# Patient Record
Sex: MALE | Race: WHITE | NOT HISPANIC OR LATINO | Employment: OTHER | URBAN - METROPOLITAN AREA
[De-identification: names, ages, dates, MRNs, and addresses within clinical notes are randomized per-mention and may not be internally consistent; named-entity substitution may affect disease eponyms.]

---

## 2017-02-14 ENCOUNTER — TRANSCRIBE ORDERS (OUTPATIENT)
Dept: NUTRITION | Facility: HOSPITAL | Age: 67
End: 2017-02-14

## 2017-02-14 DIAGNOSIS — E11.9 DIABETES MELLITUS WITHOUT COMPLICATION (HCC): Primary | ICD-10-CM

## 2017-03-08 ENCOUNTER — OFFICE VISIT (OUTPATIENT)
Dept: NUTRITION | Facility: HOSPITAL | Age: 67
End: 2017-03-08

## 2017-03-08 PROCEDURE — G0108 DIAB MANAGE TRN  PER INDIV: HCPCS

## 2017-03-29 ENCOUNTER — APPOINTMENT (OUTPATIENT)
Dept: NUTRITION | Facility: HOSPITAL | Age: 67
End: 2017-03-29

## 2017-10-18 ENCOUNTER — OFFICE VISIT (OUTPATIENT)
Dept: CARDIOLOGY | Facility: CLINIC | Age: 67
End: 2017-10-18

## 2017-10-18 VITALS
OXYGEN SATURATION: 97 % | DIASTOLIC BLOOD PRESSURE: 70 MMHG | WEIGHT: 196.2 LBS | BODY MASS INDEX: 26.57 KG/M2 | SYSTOLIC BLOOD PRESSURE: 130 MMHG | HEIGHT: 72 IN

## 2017-10-18 DIAGNOSIS — I10 ESSENTIAL HYPERTENSION: ICD-10-CM

## 2017-10-18 DIAGNOSIS — E78.2 MIXED HYPERLIPIDEMIA: ICD-10-CM

## 2017-10-18 DIAGNOSIS — R07.9 EXERTIONAL CHEST PAIN: Primary | ICD-10-CM

## 2017-10-18 PROCEDURE — 93000 ELECTROCARDIOGRAM COMPLETE: CPT | Performed by: INTERNAL MEDICINE

## 2017-10-18 PROCEDURE — 99204 OFFICE O/P NEW MOD 45 MIN: CPT | Performed by: INTERNAL MEDICINE

## 2017-10-18 RX ORDER — ALFUZOSIN HYDROCHLORIDE 10 MG/1
10 TABLET, EXTENDED RELEASE ORAL DAILY
COMMUNITY
Start: 2017-08-29

## 2017-10-18 RX ORDER — TADALAFIL 5 MG/1
5 TABLET ORAL AS NEEDED
COMMUNITY
End: 2019-05-15 | Stop reason: SDUPTHER

## 2017-10-18 RX ORDER — ATORVASTATIN CALCIUM 20 MG/1
20 TABLET, FILM COATED ORAL DAILY
COMMUNITY
Start: 2017-08-29 | End: 2019-09-03 | Stop reason: SDUPTHER

## 2017-10-18 RX ORDER — EMPAGLIFLOZIN 10 MG/1
TABLET, FILM COATED ORAL
COMMUNITY
Start: 2017-09-26 | End: 2019-06-18 | Stop reason: ALTCHOICE

## 2017-10-18 NOTE — PROGRESS NOTES
Encounter Date:10/18/2017    Location: [place]    Newton Cruz MD    Patient ID: Francisco Gonzalez is a 66 y.o. male    1950  Subjective:      Chief Complaint/Reason for visit:    Chief Complaint   Patient presents with   • Chest Pain     Consult   • Abnormal ECG       Problem List:  1.  Chest pain   A.  Cardiolite 2013:  EF 63%, negative for ischemia  2.  Hypertension  3.  Hyperlipidemia  4.  Diabetes   A.  Hgb A1c- 6.7 2017  5.  Prostate    HPI:  Patient is a pleasant 66 year old male with the above noted medical history who presents today to re-establish care and to further assess his chest pain.  He states that he began noticing intermittent left sided chest pains a couple of months ago.  He states that it can occur at rest, but he notices it the most with activity.  He exercises on a regular basis with weights and cardio.  Although his twinges of chest pain do not necessarily stop him from exercising, he states he feels like he cannot go to his maximum capacity because of his chest pains.  He has a family history of coronary disease in his father.  He has significant cardiac risk factors such as hypertension, hyperlipidemia, diabetes, and family history.         Cardiac ROS:  Positive for chest pain.  Negative for shortness of breath, dyspnea on exertion, orthopnea, PND, fatigue, edema, palpitations, dizziness, pre-syncope/ syncope, snoring/ ISIAH    Cardiac Risk Factors: advanced age (older than 55 for men, 65 for women), diabetes mellitus, dyslipidemia, family history of premature cardiovascular disease, hypertension and male gender  Social History     Social History   • Marital status:      Spouse name: N/A   • Number of children: N/A   • Years of education: N/A     Occupational History   • Not on file.     Social History Main Topics   • Smoking status: Never Smoker   • Smokeless tobacco: Never Used   • Alcohol use No   • Drug use: No   • Sexual activity: Defer     Other Topics Concern    • Not on file     Social History Narrative   • No narrative on file       family history includes Heart attack in his father; Hyperlipidemia in his brother; Hypertension in his mother; No Known Problems in his sister.     has a past medical history of Anxiety; Chicken pox; Hyperlipidemia; Measles; and Type 2 diabetes mellitus.    No Known Allergies      Current Outpatient Prescriptions:   •  alfuzosin (UROXATRAL) 10 MG 24 hr tablet, Daily., Disp: , Rfl:   •  aspirin 81 MG tablet, Take 81 mg by mouth Daily., Disp: , Rfl:   •  atorvastatin (LIPITOR) 20 MG tablet, Daily., Disp: , Rfl:   •  DiphenhydrAMINE HCl, Sleep, (ZZZQUIL PO), Take  by mouth As Needed., Disp: , Rfl:   •  Hydrocortisone Acetate (PROCTOSOL RE), Insert  into the rectum As Needed., Disp: , Rfl:   •  JARDIANCE 10 MG tablet, As Needed., Disp: , Rfl:   •  metFORMIN (GLUCOPHAGE) 1000 MG tablet, 2 (Two) Times a Day., Disp: , Rfl: 0  •  Multiple Vitamins-Minerals (MULTIVITAMIN ADULTS 50+ PO), Take  by mouth Daily., Disp: , Rfl:   •  tadalafil (CIALIS) 5 MG tablet, Take 5 mg by mouth As Needed for erectile dysfunction., Disp: , Rfl:     Review of Systems   Constitution: Negative.   HENT: Negative.    Eyes: Negative.         Wears glasses   Cardiovascular: Positive for chest pain. Negative for claudication, dyspnea on exertion, irregular heartbeat, leg swelling, near-syncope, orthopnea, palpitations, paroxysmal nocturnal dyspnea and syncope.   Respiratory: Negative.    Endocrine: Negative.    Hematologic/Lymphatic: Negative.    Skin: Negative.    Musculoskeletal: Negative.    Gastrointestinal: Negative.    Genitourinary: Positive for urgency.   Neurological: Negative.    Psychiatric/Behavioral: Negative.    Allergic/Immunologic: Negative.        Vitals:    10/18/17 1359   BP: 130/70   SpO2: 97%          Objective:       Physical Exam   Constitutional: He is oriented to person, place, and time. He appears well-developed and well-nourished.   HENT:   Head:  Normocephalic and atraumatic.   Eyes: Pupils are equal, round, and reactive to light. Right eye exhibits no discharge. Left eye exhibits no discharge.   Neck: Normal range of motion. Neck supple. No JVD present.   Cardiovascular: Normal rate, regular rhythm, normal heart sounds and intact distal pulses.  Exam reveals no gallop and no friction rub.    No murmur heard.  Pulmonary/Chest: Effort normal and breath sounds normal. He has no wheezes.   Abdominal: Soft. Bowel sounds are normal. There is no tenderness.   Musculoskeletal: Normal range of motion. He exhibits no edema.   Neurological: He is alert and oriented to person, place, and time.   Skin: Skin is warm and dry.   Psychiatric: He has a normal mood and affect. His behavior is normal.       Data Review:     ECG 12 Lead  Date/Time: 10/18/2017 2:18 PM  Performed by: YUSUF HAYDEN  Authorized by: YUSUF HAYDEN   Rhythm: sinus rhythm  Rate: normal  BPM: 85  QRS axis: normal  Clinical impression: normal ECG        Assessment:      ICD-10-CM ICD-9-CM   1. Exertional chest pain- further testing warranted R07.9 786.50   2. Mixed hyperlipidemia- on statin E78.2 272.2   3. Essential hypertension- well controlled I10 401.9       Plan:  1.  Stress Echocardiogram  2. Continue current medications  3.  F/up in 3 years or sooner if needed.        Scribed for Lauren Ramos MD by SCOT Son. 10/18/2017  2:21 PM     I Lauren Ramos MD personally performed the services described in this documentation as scribed by the above individual in my presence, and it is both accurate and complete.    Lauren Ramos MD, [facility]

## 2017-11-17 ENCOUNTER — HOSPITAL ENCOUNTER (OUTPATIENT)
Dept: CARDIOLOGY | Facility: HOSPITAL | Age: 67
Discharge: HOME | End: 2017-11-17
Admitting: NURSE PRACTITIONER

## 2017-11-17 VITALS
HEART RATE: 65 BPM | DIASTOLIC BLOOD PRESSURE: 84 MMHG | BODY MASS INDEX: 26.01 KG/M2 | WEIGHT: 192 LBS | HEIGHT: 72 IN | SYSTOLIC BLOOD PRESSURE: 148 MMHG

## 2017-11-17 DIAGNOSIS — R07.9 EXERTIONAL CHEST PAIN: ICD-10-CM

## 2017-11-17 PROCEDURE — 93352 ADMIN ECG CONTRAST AGENT: CPT | Performed by: INTERNAL MEDICINE

## 2017-11-17 PROCEDURE — 25010000002 SULFUR HEXAFLUORIDE MICROSPH 60.7-25 MG RECONSTITUTED SUSPENSION: Performed by: INTERNAL MEDICINE

## 2017-11-17 PROCEDURE — 93350 STRESS TTE ONLY: CPT

## 2017-11-17 PROCEDURE — 93350 STRESS TTE ONLY: CPT | Performed by: INTERNAL MEDICINE

## 2017-11-17 PROCEDURE — 93018 CV STRESS TEST I&R ONLY: CPT | Performed by: INTERNAL MEDICINE

## 2017-11-17 PROCEDURE — 93017 CV STRESS TEST TRACING ONLY: CPT

## 2017-11-17 RX ADMIN — SULFUR HEXAFLUORIDE 5 ML: KIT at 09:13

## 2017-11-20 LAB
BH CV STRESS BP STAGE 1: NORMAL
BH CV STRESS BP STAGE 2: NORMAL
BH CV STRESS BP STAGE 3: NORMAL
BH CV STRESS BP STAGE 4: NORMAL
BH CV STRESS BP STAGE 5: NORMAL
BH CV STRESS DURATION MIN STAGE 1: 3
BH CV STRESS DURATION MIN STAGE 2: 3
BH CV STRESS DURATION MIN STAGE 3: 3
BH CV STRESS DURATION MIN STAGE 4: 3
BH CV STRESS DURATION MIN STAGE 5: 1
BH CV STRESS DURATION SEC STAGE 1: 0
BH CV STRESS DURATION SEC STAGE 2: 0
BH CV STRESS DURATION SEC STAGE 3: 0
BH CV STRESS DURATION SEC STAGE 4: 0
BH CV STRESS DURATION SEC STAGE 5: 4
BH CV STRESS GRADE STAGE 1: 10
BH CV STRESS GRADE STAGE 2: 12
BH CV STRESS GRADE STAGE 3: 14
BH CV STRESS GRADE STAGE 4: 16
BH CV STRESS GRADE STAGE 5: 18
BH CV STRESS HR STAGE 1: 99
BH CV STRESS HR STAGE 2: 104
BH CV STRESS HR STAGE 3: 126
BH CV STRESS HR STAGE 4: 144
BH CV STRESS HR STAGE 5: 151
BH CV STRESS METS STAGE 1: 5
BH CV STRESS METS STAGE 2: 7.5
BH CV STRESS METS STAGE 3: 10
BH CV STRESS METS STAGE 4: 13.5
BH CV STRESS METS STAGE 5: 15
BH CV STRESS O2 STAGE 1: 98
BH CV STRESS O2 STAGE 2: 99
BH CV STRESS O2 STAGE 3: 98
BH CV STRESS O2 STAGE 4: 98
BH CV STRESS PROTOCOL 1: NORMAL
BH CV STRESS RECOVERY BP: NORMAL MMHG
BH CV STRESS RECOVERY HR: 86 BPM
BH CV STRESS SPEED STAGE 1: 1.7
BH CV STRESS SPEED STAGE 2: 2.5
BH CV STRESS SPEED STAGE 3: 3.4
BH CV STRESS SPEED STAGE 4: 4.2
BH CV STRESS SPEED STAGE 5: 5
BH CV STRESS STAGE 1: 1
BH CV STRESS STAGE 2: 2
BH CV STRESS STAGE 3: 3
BH CV STRESS STAGE 4: 4
BH CV STRESS STAGE 5: 5
LV EF 2D ECHO EST: 60 %
MAXIMAL PREDICTED HEART RATE: 155 BPM
PERCENT MAX PREDICTED HR: 97.42 %
STRESS BASELINE BP: NORMAL MMHG
STRESS BASELINE HR: 71 BPM
STRESS PERCENT HR: 115 %
STRESS POST ESTIMATED WORKLOAD: 17.2 METS
STRESS POST EXERCISE DUR MIN: 13 MIN
STRESS POST EXERCISE DUR SEC: 4 SEC
STRESS POST O2 SAT PEAK: 98 %
STRESS POST PEAK BP: NORMAL MMHG
STRESS POST PEAK HR: 151 BPM
STRESS TARGET HR: 132 BPM

## 2018-02-26 ENCOUNTER — HOSPITAL ENCOUNTER (EMERGENCY)
Facility: HOSPITAL | Age: 68
Discharge: HOME | End: 2018-02-27
Attending: EMERGENCY MEDICINE | Admitting: EMERGENCY MEDICINE

## 2018-02-26 ENCOUNTER — APPOINTMENT (OUTPATIENT)
Dept: GENERAL RADIOLOGY | Facility: HOSPITAL | Age: 68
End: 2018-02-26

## 2018-02-26 DIAGNOSIS — R50.9 FEVER AND CHILLS: ICD-10-CM

## 2018-02-26 DIAGNOSIS — R05.9 COUGH: ICD-10-CM

## 2018-02-26 DIAGNOSIS — R68.89 FLU-LIKE SYMPTOMS: Primary | ICD-10-CM

## 2018-02-26 DIAGNOSIS — E11.9 TYPE 2 DIABETES MELLITUS WITHOUT COMPLICATION, WITHOUT LONG-TERM CURRENT USE OF INSULIN (HCC): ICD-10-CM

## 2018-02-26 LAB
ALBUMIN SERPL-MCNC: 4.2 G/DL (ref 3.2–4.8)
ALBUMIN/GLOB SERPL: 1.8 G/DL (ref 1.5–2.5)
ALP SERPL-CCNC: 74 U/L (ref 25–100)
ALT SERPL W P-5'-P-CCNC: 27 U/L (ref 7–40)
ANION GAP SERPL CALCULATED.3IONS-SCNC: 9 MMOL/L (ref 3–11)
AST SERPL-CCNC: 18 U/L (ref 0–33)
BASOPHILS # BLD AUTO: 0.01 10*3/MM3 (ref 0–0.2)
BASOPHILS NFR BLD AUTO: 0.1 % (ref 0–1)
BILIRUB SERPL-MCNC: 0.5 MG/DL (ref 0.3–1.2)
BUN BLD-MCNC: 20 MG/DL (ref 9–23)
BUN/CREAT SERPL: 22.2 (ref 7–25)
CALCIUM SPEC-SCNC: 8.9 MG/DL (ref 8.7–10.4)
CHLORIDE SERPL-SCNC: 108 MMOL/L (ref 99–109)
CO2 SERPL-SCNC: 21 MMOL/L (ref 20–31)
CREAT BLD-MCNC: 0.9 MG/DL (ref 0.6–1.3)
DEPRECATED RDW RBC AUTO: 41.9 FL (ref 37–54)
EOSINOPHIL # BLD AUTO: 0.01 10*3/MM3 (ref 0–0.3)
EOSINOPHIL NFR BLD AUTO: 0.1 % (ref 0–3)
ERYTHROCYTE [DISTWIDTH] IN BLOOD BY AUTOMATED COUNT: 12.8 % (ref 11.3–14.5)
FLUAV AG NPH QL: NEGATIVE
FLUBV AG NPH QL IA: NEGATIVE
GFR SERPL CREATININE-BSD FRML MDRD: 85 ML/MIN/1.73
GLOBULIN UR ELPH-MCNC: 2.4 GM/DL
GLUCOSE BLD-MCNC: 147 MG/DL (ref 70–100)
HCT VFR BLD AUTO: 42.9 % (ref 38.9–50.9)
HGB BLD-MCNC: 14.4 G/DL (ref 13.1–17.5)
IMM GRANULOCYTES # BLD: 0.02 10*3/MM3 (ref 0–0.03)
IMM GRANULOCYTES NFR BLD: 0.2 % (ref 0–0.6)
LYMPHOCYTES # BLD AUTO: 0.43 10*3/MM3 (ref 0.6–4.8)
LYMPHOCYTES NFR BLD AUTO: 4 % (ref 24–44)
MCH RBC QN AUTO: 29.9 PG (ref 27–31)
MCHC RBC AUTO-ENTMCNC: 33.6 G/DL (ref 32–36)
MCV RBC AUTO: 89.2 FL (ref 80–99)
MONOCYTES # BLD AUTO: 0.71 10*3/MM3 (ref 0–1)
MONOCYTES NFR BLD AUTO: 6.7 % (ref 0–12)
NEUTROPHILS # BLD AUTO: 9.49 10*3/MM3 (ref 1.5–8.3)
NEUTROPHILS NFR BLD AUTO: 88.9 % (ref 41–71)
PLATELET # BLD AUTO: 216 10*3/MM3 (ref 150–450)
PMV BLD AUTO: 10.7 FL (ref 6–12)
POTASSIUM BLD-SCNC: 3.8 MMOL/L (ref 3.5–5.5)
PROT SERPL-MCNC: 6.6 G/DL (ref 5.7–8.2)
RBC # BLD AUTO: 4.81 10*6/MM3 (ref 4.2–5.76)
SODIUM BLD-SCNC: 138 MMOL/L (ref 132–146)
WBC NRBC COR # BLD: 10.67 10*3/MM3 (ref 3.5–10.8)

## 2018-02-26 PROCEDURE — 71045 X-RAY EXAM CHEST 1 VIEW: CPT

## 2018-02-26 PROCEDURE — 87804 INFLUENZA ASSAY W/OPTIC: CPT | Performed by: EMERGENCY MEDICINE

## 2018-02-26 PROCEDURE — 99284 EMERGENCY DEPT VISIT MOD MDM: CPT

## 2018-02-26 PROCEDURE — 80053 COMPREHEN METABOLIC PANEL: CPT | Performed by: PHYSICIAN ASSISTANT

## 2018-02-26 PROCEDURE — 85025 COMPLETE CBC W/AUTO DIFF WBC: CPT | Performed by: PHYSICIAN ASSISTANT

## 2018-02-26 RX ORDER — DOXYCYCLINE HYCLATE 100 MG/1
100 TABLET, DELAYED RELEASE ORAL 2 TIMES DAILY
Qty: 14 TABLET | Refills: 0 | Status: SHIPPED | OUTPATIENT
Start: 2018-02-26 | End: 2019-06-18

## 2018-02-26 RX ORDER — DOXYCYCLINE HYCLATE 100 MG
100 TABLET ORAL ONCE
Status: COMPLETED | OUTPATIENT
Start: 2018-02-26 | End: 2018-02-27

## 2018-02-26 RX ORDER — OSELTAMIVIR PHOSPHATE 75 MG/1
75 CAPSULE ORAL EVERY 12 HOURS
Qty: 9 CAPSULE | Refills: 0 | Status: SHIPPED | OUTPATIENT
Start: 2018-02-26 | End: 2018-10-28

## 2018-02-26 RX ORDER — AZITHROMYCIN 250 MG/1
250 TABLET, FILM COATED ORAL DAILY
COMMUNITY
End: 2019-06-18

## 2018-02-26 RX ORDER — OSELTAMIVIR PHOSPHATE 75 MG/1
75 CAPSULE ORAL ONCE
Status: COMPLETED | OUTPATIENT
Start: 2018-02-26 | End: 2018-02-27

## 2018-02-26 RX ORDER — IBUPROFEN 600 MG/1
600 TABLET ORAL EVERY 4 HOURS PRN
Status: DISCONTINUED | OUTPATIENT
Start: 2018-02-26 | End: 2018-02-27 | Stop reason: HOSPADM

## 2018-02-26 RX ORDER — ACETAMINOPHEN 325 MG/1
650 TABLET ORAL ONCE
Status: COMPLETED | OUTPATIENT
Start: 2018-02-26 | End: 2018-02-26

## 2018-02-26 RX ADMIN — ACETAMINOPHEN 650 MG: 325 TABLET, FILM COATED ORAL at 21:33

## 2018-02-26 RX ADMIN — IBUPROFEN 600 MG: 600 TABLET, FILM COATED ORAL at 23:22

## 2018-02-27 VITALS
WEIGHT: 187 LBS | DIASTOLIC BLOOD PRESSURE: 75 MMHG | OXYGEN SATURATION: 94 % | TEMPERATURE: 99.1 F | SYSTOLIC BLOOD PRESSURE: 135 MMHG | BODY MASS INDEX: 25.33 KG/M2 | HEART RATE: 101 BPM | HEIGHT: 72 IN | RESPIRATION RATE: 16 BRPM

## 2018-02-27 RX ADMIN — OSELTAMIVIR PHOSPHATE 75 MG: 75 CAPSULE ORAL at 00:06

## 2018-02-27 RX ADMIN — DOXYCYCLINE HYCLATE 100 MG: 100 TABLET, COATED ORAL at 00:07

## 2019-01-03 ENCOUNTER — OFFICE VISIT (OUTPATIENT)
Dept: INTERNAL MEDICINE | Facility: CLINIC | Age: 69
End: 2019-01-03

## 2019-01-03 VITALS
HEART RATE: 72 BPM | WEIGHT: 186 LBS | HEIGHT: 71 IN | TEMPERATURE: 98.4 F | SYSTOLIC BLOOD PRESSURE: 124 MMHG | DIASTOLIC BLOOD PRESSURE: 78 MMHG | BODY MASS INDEX: 26.04 KG/M2

## 2019-01-03 DIAGNOSIS — L98.9 SKIN LESION: Primary | ICD-10-CM

## 2019-01-03 PROCEDURE — 99213 OFFICE O/P EST LOW 20 MIN: CPT | Performed by: PHYSICIAN ASSISTANT

## 2019-01-03 RX ORDER — INFLUENZA A VIRUS A/MICHIGAN/45/2015 X-275 (H1N1) ANTIGEN (FORMALDEHYDE INACTIVATED), INFLUENZA A VIRUS A/SINGAPORE/INFIMH-16-0019/2016 IVR-186 (H3N2) ANTIGEN (FORMALDEHYDE INACTIVATED), AND INFLUENZA B VIRUS B/MARYLAND/15/2016 BX-69A (A B/COLORADO/6/2017-LIKE VIRUS) ANTIGEN (FORMALDEHYDE INACTIVATED) 60; 60; 60 UG/.5ML; UG/.5ML; UG/.5ML
INJECTION, SUSPENSION INTRAMUSCULAR
Refills: 0 | COMMUNITY
Start: 2018-10-07 | End: 2019-09-03

## 2019-01-03 NOTE — PROGRESS NOTES
"Patient Care Team:  Newton Cruz MD as PCP - General (Internal Medicine)  Gisselle Izaguirre PA-C as Physician Assistant (Internal Medicine)    Chief Complaint;:   Chief Complaint   Patient presents with   • Rash     on top of head        Subjective     HPI  Nonhealing skin lesion on top of head for several weeks  Past Medical History:   Diagnosis Date   • Anxiety    • Chicken pox    • Hyperlipidemia    • Measles    • Type 2 diabetes mellitus (CMS/[facility])        Social History     Socioeconomic History   • Marital status:      Spouse name: Not on file   • Number of children: Not on file   • Years of education: Not on file   • Highest education level: Not on file   Social Needs   • Financial resource strain: Not on file   • Food insecurity - worry: Not on file   • Food insecurity - inability: Not on file   • Transportation needs - medical: Not on file   • Transportation needs - non-medical: Not on file   Occupational History   • Not on file   Tobacco Use   • Smoking status: Never Smoker   • Smokeless tobacco: Never Used   Substance and Sexual Activity   • Alcohol use: No   • Drug use: No   • Sexual activity: Defer   Other Topics Concern   • Not on file   Social History Narrative   • Not on file       No Known Allergies    Review of Systems:     Review of Systems   Skin: Positive for skin lesions.       Vital Signs  Vitals:    01/03/19 1634   BP: 124/78   BP Location: Left arm   Patient Position: Sitting   Cuff Size: Adult   Pulse: 72   Temp: 98.4 °F (36.9 °C)   TempSrc: Temporal   Weight: 84.4 kg (186 lb)   Height: 180.3 cm (71\")   PainSc: 0-No pain         Current Outpatient Medications:   •  alfuzosin (UROXATRAL) 10 MG 24 hr tablet, Daily., Disp: , Rfl:   •  atorvastatin (LIPITOR) 20 MG tablet, Daily., Disp: , Rfl:   •  DiphenhydrAMINE HCl, Sleep, (ZZZQUIL PO), Take  by mouth As Needed., Disp: , Rfl:   •  doxycycline (DORYX) 100 MG enteric coated tablet, Take 1 tablet by mouth 2 (Two) Times a Day., " Disp: 14 tablet, Rfl: 0  •  Empagliflozin (JARDIANCE) 10 MG tablet, Jardiance 10 mg tablet  Take 1 tablet every day by oral route., Disp: , Rfl:   •  Hydrocortisone Acetate (PROCTOSOL RE), Insert  into the rectum As Needed., Disp: , Rfl:   •  JARDIANCE 10 MG tablet, , Disp: , Rfl:   •  metFORMIN (GLUCOPHAGE) 1000 MG tablet, 2 (Two) Times a Day., Disp: , Rfl: 0  •  Multiple Vitamins-Minerals (MULTIVITAMIN ADULTS 50+ PO), Take  by mouth Daily., Disp: , Rfl:   •  tadalafil (CIALIS) 5 MG tablet, Take 5 mg by mouth As Needed for erectile dysfunction., Disp: , Rfl:   •  aspirin 81 MG tablet, Take 81 mg by mouth Daily., Disp: , Rfl:   •  azithromycin (ZITHROMAX) 250 MG tablet, Take 250 mg by mouth Daily. Take 2 tablets the first day, then 1 tablet daily for 4 days., Disp: , Rfl:   •  cephalexin (KEFLEX) 500 MG capsule, Take 1 capsule by mouth 3 (Three) Times a Day., Disp: 30 capsule, Rfl: 0  •  FLUZONE HIGH-DOSE 0.5 ML suspension prefilled syringe injection, ADM 0.5ML IM UTD, Disp: , Rfl: 0  •  predniSONE (DELTASONE) 10 MG tablet, Daily taper - 6/5/4/3/2/1, Disp: 21 tablet, Rfl: 0  •  promethazine-codeine (PHENERGAN with CODEINE) 6.25-10 MG/5ML syrup, Take 5 mL by mouth Every 4 (Four) Hours As Needed for Cough., Disp: 90 mL, Rfl: 0    Physical Exam:    Physical Exam   Constitutional: He appears well-developed and well-nourished.   Skin: Skin is warm and dry.   Dry flaking skin lesion top of scalp   Nursing note and vitals reviewed.        Assessment/Plan   Encounter Diagnosis   Name Primary?   • Skin lesion Yes       Plan of care reviewed with patient at the conclusion of today's visit. Education was provided regarding diagnosis, management, and any prescribed or recommended OTC medications.Patient verbalizes understanding of and agreement with management plan.     Gisselle Izaguirre PA-C

## 2019-04-01 PROBLEM — J45.909 ASTHMA: Status: ACTIVE | Noted: 2019-04-01

## 2019-04-01 PROBLEM — N40.0 HYPERTROPHY OF PROSTATE WITHOUT URINARY OBSTRUCTION: Status: ACTIVE | Noted: 2019-04-01

## 2019-04-01 PROBLEM — E11.9 DIABETES MELLITUS, TYPE II: Status: ACTIVE | Noted: 2019-04-01

## 2019-04-01 PROBLEM — N41.0 ACUTE PROSTATITIS: Status: ACTIVE | Noted: 2019-04-01

## 2019-04-01 PROBLEM — F41.1 ANXIETY STATE: Status: ACTIVE | Noted: 2019-04-01

## 2019-04-01 PROBLEM — H90.3 SENSORINEURAL HEARING LOSS (SNHL) OF BOTH EARS: Status: ACTIVE | Noted: 2019-04-01

## 2019-04-01 PROBLEM — J30.9 ALLERGIC RHINITIS: Status: ACTIVE | Noted: 2019-04-01

## 2019-05-14 ENCOUNTER — PATIENT MESSAGE (OUTPATIENT)
Dept: INTERNAL MEDICINE | Facility: CLINIC | Age: 69
End: 2019-05-14

## 2019-05-14 NOTE — TELEPHONE ENCOUNTER
From: Francisco Gonzalez  To: OtoeNewton banda MD  Sent: 5/14/2019 11:33 AM EDT  Subject: Prescription Question    Januszmaritza Bradford, I have run out of my Cilias samples and need a script. I had to double the dose to 10mg and that dose is very effective. Even as a generic they are expensive. I called [facility] in [place] and 10 of the 10mg were about $239.00. I've read some on line info getting these drugs drugs from a company called [employer]. You do an on line assessment and they will ship to you. Couldn't get a price and didn't complete the assessment as they were wanting a lot of medical info. Have any patients used this system?

## 2019-05-15 RX ORDER — TADALAFIL 5 MG/1
5 TABLET ORAL AS NEEDED
Qty: 30 TABLET | Refills: 5 | Status: SHIPPED | OUTPATIENT
Start: 2019-05-15 | End: 2021-01-05

## 2019-06-13 PROBLEM — Z00.00 ANNUAL PHYSICAL EXAM: Status: ACTIVE | Noted: 2019-06-13

## 2019-06-13 PROBLEM — R07.89 ATYPICAL CHEST PAIN: Status: ACTIVE | Noted: 2019-06-13

## 2019-06-18 ENCOUNTER — OFFICE VISIT (OUTPATIENT)
Dept: INTERNAL MEDICINE | Facility: CLINIC | Age: 69
End: 2019-06-18

## 2019-06-18 VITALS
SYSTOLIC BLOOD PRESSURE: 122 MMHG | WEIGHT: 182 LBS | HEIGHT: 71 IN | BODY MASS INDEX: 25.48 KG/M2 | HEART RATE: 80 BPM | DIASTOLIC BLOOD PRESSURE: 70 MMHG

## 2019-06-18 DIAGNOSIS — E11.9 TYPE 2 DIABETES MELLITUS WITHOUT COMPLICATION, WITHOUT LONG-TERM CURRENT USE OF INSULIN (HCC): ICD-10-CM

## 2019-06-18 DIAGNOSIS — E78.2 MIXED HYPERLIPIDEMIA: ICD-10-CM

## 2019-06-18 DIAGNOSIS — I10 ESSENTIAL HYPERTENSION: Primary | ICD-10-CM

## 2019-06-18 DIAGNOSIS — N40.0 HYPERTROPHY OF PROSTATE WITHOUT URINARY OBSTRUCTION: ICD-10-CM

## 2019-06-18 PROCEDURE — 99214 OFFICE O/P EST MOD 30 MIN: CPT | Performed by: INTERNAL MEDICINE

## 2019-06-18 RX ORDER — EMPAGLIFLOZIN 25 MG/1
1 TABLET, FILM COATED ORAL DAILY
COMMUNITY
Start: 2019-04-15 | End: 2021-04-07

## 2019-06-18 NOTE — PROGRESS NOTES
[facility]     Francisco Gonzalez  1950   [id number]      Patient Care Team:  Newton Cruz MD as PCP - General (Internal Medicine)  Tracy Romero APRN as PCP - Claims Attributed  Gisselle Izaguirre PA-C as Physician Assistant (Internal Medicine)    Chief Complaint::   Chief Complaint   Patient presents with   • Hyperlipidemia   • Hypertension   • Diabetes        HPI  Mr. Gonzalez is now 68.  He comes in for follow-up of his hypertension hyperlipidemia and BPH.  He continues to see Dr. Gray for his diabetes.  His last A1c was 7.4.  He continues to work hard with diet and exercise.  He recently saw Dr. Roca for his BPH.  His PSA was less than 1.  He feels well and has no complaints.  Maintains very active physically, working out regularly.  There is no chest pain or dyspnea.    Chronic Conditions:      Patient Active Problem List   Diagnosis   • Exertional chest pain   • Essential hypertension   • Mixed hyperlipidemia   • Asthma   • Sensorineural hearing loss (SNHL) of both ears   • Allergic rhinitis   • Acute prostatitis   • Diabetes mellitus, type II (CMS/HCC)   • Hypertrophy of prostate without urinary obstruction   • Anxiety state   • Annual physical exam   • Atypical chest pain        Past Medical History:   Diagnosis Date   • Anxiety    • Chicken pox    • Colonic polyp    • Hydrocele 2001    left; repaired   • Hyperlipidemia    • Measles    • Quadriceps tendon rupture 2010   • Type 2 diabetes mellitus (CMS/HCC)        Past Surgical History:   Procedure Laterality Date   • APPENDECTOMY  1986   • HYDROCELE EXCISION / REPAIR Left 2001   • KNEE SURGERY      left knee   • QUADRICEPS TENDON REPAIR  2010    left quadracepts tendon rupture   • VASECTOMY         Family History   Problem Relation Age of Onset   • Hypertension Mother    • Heart attack Father         MI   • Coronary artery disease Father 59        premature   • No Known Problems Sister    • Hyperlipidemia Brother         Social History     Socioeconomic History   • Marital status:      Spouse name: Not on file   • Number of children: Not on file   • Years of education: Not on file   • Highest education level: Not on file   Tobacco Use   • Smoking status: Never Smoker   • Smokeless tobacco: Never Used   Substance and Sexual Activity   • Alcohol use: No   • Drug use: No   • Sexual activity: Defer       No Known Allergies      Current Outpatient Medications:   •  alfuzosin (UROXATRAL) 10 MG 24 hr tablet, Daily., Disp: , Rfl:   •  aspirin 81 MG tablet, Take 81 mg by mouth Daily., Disp: , Rfl:   •  atorvastatin (LIPITOR) 20 MG tablet, Daily., Disp: , Rfl:   •  DiphenhydrAMINE HCl, Sleep, (ZZZQUIL PO), Take  by mouth As Needed., Disp: , Rfl:   •  Hydrocortisone Acetate (PROCTOSOL RE), Insert  into the rectum As Needed., Disp: , Rfl:   •  JARDIANCE 25 MG tablet, Take 1 tablet by mouth Daily., Disp: , Rfl:   •  metFORMIN (GLUCOPHAGE) 1000 MG tablet, 2 (Two) Times a Day., Disp: , Rfl: 0  •  Multiple Vitamins-Minerals (MULTIVITAMIN ADULTS 50+ PO), Take  by mouth Daily., Disp: , Rfl:   •  tadalafil (CIALIS) 5 MG tablet, Take 1 tablet by mouth As Needed for erectile dysfunction., Disp: 30 tablet, Rfl: 5  •  FLUZONE HIGH-DOSE 0.5 ML suspension prefilled syringe injection, ADM 0.5ML IM UTD, Disp: , Rfl: 0    Review of Systems   Constitutional: Negative for chills, fatigue and fever.   HENT: Negative for congestion, ear pain and sinus pressure.    Respiratory: Negative for cough, chest tightness, shortness of breath and wheezing.    Cardiovascular: Negative for chest pain and palpitations.   Gastrointestinal: Negative for abdominal pain, blood in stool and constipation.   Skin: Negative for color change.   Allergic/Immunologic: Negative for environmental allergies.   Neurological: Negative for dizziness, speech difficulty and headache.   Psychiatric/Behavioral: Negative for decreased concentration. The patient is not nervous/anxious.   "       Vital Signs  Vitals:    06/18/19 1007   BP: 122/70   BP Location: Right arm   Patient Position: Sitting   Cuff Size: Adult   Pulse: 80   Weight: 82.6 kg (182 lb)   Height: 180.3 cm (70.98\")       Physical Exam   Constitutional: He is oriented to person, place, and time. He appears well-developed and well-nourished.   HENT:   Head: Normocephalic and atraumatic.   Cardiovascular: Normal rate, regular rhythm and normal heart sounds.   No murmur heard.  Pulmonary/Chest: Effort normal and breath sounds normal.   Neurological: He is alert and oriented to person, place, and time.   Psychiatric: He has a normal mood and affect.   Vitals reviewed.     Procedures    ACE III MINI             Assessment/Plan:    Francisco was seen today for hyperlipidemia, hypertension and diabetes.    Diagnoses and all orders for this visit:    Essential hypertension    Type 2 diabetes mellitus without complication, without long-term current use of insulin (CMS/[facility])    Mixed hyperlipidemia    Hypertrophy of prostate without urinary obstruction    Plan    Blood pressure is well controlled with diet and exercise.     Last A1c was 7.4 per Dr Allen. Continue jardiance and metformin, as well as his efforts with diet and exercise      Lipid panel pending, continue atovastatin.     BPH controlled per Dr Roca.       Plan of care reviewed with patient at the conclusion of today's visit. Education was provided regarding diagnosis, management, and any prescribed or recommended OTC medications.Patient verbalizes understanding of and agreement with management plan.         Newton Cruz MD           "

## 2019-08-31 RX ORDER — ATORVASTATIN CALCIUM 20 MG/1
TABLET, FILM COATED ORAL
Qty: 90 TABLET | Refills: 0 | Status: CANCELLED | OUTPATIENT
Start: 2019-08-31

## 2019-09-03 ENCOUNTER — OFFICE VISIT (OUTPATIENT)
Dept: INTERNAL MEDICINE | Facility: CLINIC | Age: 69
End: 2019-09-03

## 2019-09-03 VITALS
HEART RATE: 88 BPM | BODY MASS INDEX: 24.78 KG/M2 | HEIGHT: 71 IN | WEIGHT: 177 LBS | DIASTOLIC BLOOD PRESSURE: 68 MMHG | SYSTOLIC BLOOD PRESSURE: 122 MMHG | TEMPERATURE: 98.6 F

## 2019-09-03 DIAGNOSIS — B96.89 ACUTE BACTERIAL RHINOSINUSITIS: Primary | ICD-10-CM

## 2019-09-03 DIAGNOSIS — J01.90 ACUTE BACTERIAL RHINOSINUSITIS: Primary | ICD-10-CM

## 2019-09-03 PROCEDURE — 99213 OFFICE O/P EST LOW 20 MIN: CPT | Performed by: NURSE PRACTITIONER

## 2019-09-03 RX ORDER — AMOXICILLIN AND CLAVULANATE POTASSIUM 875; 125 MG/1; MG/1
1 TABLET, FILM COATED ORAL 2 TIMES DAILY
Qty: 14 TABLET | Refills: 0 | Status: SHIPPED | OUTPATIENT
Start: 2019-09-03 | End: 2019-12-03

## 2019-09-03 RX ORDER — ATORVASTATIN CALCIUM 20 MG/1
20 TABLET, FILM COATED ORAL DAILY
Qty: 90 TABLET | Refills: 1 | Status: SHIPPED | OUTPATIENT
Start: 2019-09-03 | End: 2020-02-25

## 2019-09-03 NOTE — PATIENT INSTRUCTIONS
Take antibiotic as directed.  Take with food, make sure you finish the entire prescription.  Drink extra fluids.  Use warm moist compresses to sinuses 3-4 x daily.  Nasal saline sprays may help also.  Continue your other regular medications.  If fever or discomfort use otc acetaminophen or ibuprophen as directed.  Avoid exposure to cigarette smoke.

## 2019-09-03 NOTE — PROGRESS NOTES
Francisco Gonzalez  1950  [id number]  Patient Care Team:  Newton Cruz MD as PCP - General (Internal Medicine)  Mane Roca MD as PCP - Claims Attributed  Gisselle Izaguirre PA-C as Physician Assistant (Internal Medicine)    Francisco Gonzalez is a pleasant 68 y.o. male who presents for evaluation of Sinus Problem      Chief Complaint   Patient presents with   • Sinus Problem       HPI:   2 wk hx bilat maxillary facial pain and nasal congestion and drainge. No fever, dark yellow mucous, gradually worse, teeth hurting last night.  Using mucinex BID.    Right ear known cerumen impaction, appt with ENT tomorrow  Past Medical History:   Diagnosis Date   • Anxiety    • Chicken pox    • Colonic polyp    • Hydrocele 2001    left; repaired   • Hyperlipidemia    • Measles    • Quadriceps tendon rupture 2010   • Type 2 diabetes mellitus (CMS/HCC)      Past Surgical History:   Procedure Laterality Date   • APPENDECTOMY  1986   • HYDROCELE EXCISION / REPAIR Left 2001   • KNEE SURGERY      left knee   • QUADRICEPS TENDON REPAIR  2010    left quadracepts tendon rupture   • VASECTOMY       Family History   Problem Relation Age of Onset   • Hypertension Mother    • Heart attack Father         MI   • Coronary artery disease Father 59        premature   • No Known Problems Sister    • Hyperlipidemia Brother      Social History     Tobacco Use   Smoking Status Never Smoker   Smokeless Tobacco Never Used     No Known Allergies    Current Outpatient Medications:   •  alfuzosin (UROXATRAL) 10 MG 24 hr tablet, Take 10 mg by mouth Daily., Disp: , Rfl:   •  aspirin 81 MG tablet, Take 81 mg by mouth Daily., Disp: , Rfl:   •  atorvastatin (LIPITOR) 20 MG tablet, Take 1 tablet by mouth Daily., Disp: 90 tablet, Rfl: 1  •  DiphenhydrAMINE HCl, Sleep, (ZZZQUIL PO), Take As Directed., Disp: , Rfl:   •  Hydrocortisone Acetate (PROCTOSOL RE), Insert 1 application into the rectum As Needed., Disp: , Rfl:   •  JARDIANCE 25 MG  "tablet, Take 1 tablet by mouth Daily., Disp: , Rfl:   •  metFORMIN (GLUCOPHAGE) 1000 MG tablet, Take 1,000 mg by mouth 2 (Two) Times a Day With Meals., Disp: , Rfl: 0  •  Multiple Vitamins-Minerals (MULTIVITAMIN ADULTS 50+ PO), Take 1 tablet by mouth Daily., Disp: , Rfl:   •  tadalafil (CIALIS) 5 MG tablet, Take 1 tablet by mouth As Needed for erectile dysfunction., Disp: 30 tablet, Rfl: 5  •  amoxicillin-clavulanate (AUGMENTIN) 875-125 MG per tablet, Take 1 tablet by mouth 2 (Two) Times a Day., Disp: 14 tablet, Rfl: 0    Review of Systems   Constitutional: Negative for chills, fatigue and fever.   HENT: Positive for congestion and sinus pressure. Negative for ear pain.    Respiratory: Positive for cough. Negative for chest tightness, shortness of breath and wheezing.    Cardiovascular: Negative for chest pain and palpitations.   Gastrointestinal: Negative for abdominal pain, blood in stool and constipation.   Skin: Negative for color change.   Allergic/Immunologic: Positive for environmental allergies.   Neurological: Negative for dizziness, speech difficulty and headache.   Psychiatric/Behavioral: Negative for decreased concentration. The patient is not nervous/anxious.      /68 (BP Location: Left arm, Patient Position: Sitting, Cuff Size: Adult)   Pulse 88   Temp 98.6 °F (37 °C) (Temporal)   Ht 180.3 cm (70.98\")   Wt 80.3 kg (177 lb)   BMI 24.70 kg/m²     Physical Exam   Constitutional: He appears well-developed and well-nourished.   HENT:   Head: Normocephalic and atraumatic.   Right Ear: External ear normal. cerumen impaction is present.  Left Ear: External ear normal.   Nose: Mucosal edema, rhinorrhea and sinus tenderness present. Right sinus exhibits maxillary sinus tenderness. Left sinus exhibits maxillary sinus tenderness.   Mouth/Throat: Oropharyngeal exudate present.   Eyes: Conjunctivae and EOM are normal.   Neck: Normal range of motion. Neck supple.   Cardiovascular: Normal rate, regular " rhythm and normal heart sounds.   Pulmonary/Chest: Effort normal and breath sounds normal.   Abdominal: Soft. Bowel sounds are normal.   Musculoskeletal: Normal range of motion.   Neurological: He is alert.   Skin: Skin is warm and dry.   Psychiatric: He has a normal mood and affect. His behavior is normal. Thought content normal.       Assessment/Plan:  Francisco was seen today for sinus problem.    Diagnoses and all orders for this visit:    Acute bacterial rhinosinusitis    Other orders  -     atorvastatin (LIPITOR) 20 MG tablet; Take 1 tablet by mouth Daily.  -     amoxicillin-clavulanate (AUGMENTIN) 875-125 MG per tablet; Take 1 tablet by mouth 2 (Two) Times a Day.       Patient Instructions   Take antibiotic as directed.  Take with food, make sure you finish the entire prescription.  Drink extra fluids.  Use warm moist compresses to sinuses 3-4 x daily.  Nasal saline sprays may help also.  Continue your other regular medications.  If fever or discomfort use otc acetaminophen or ibuprophen as directed.  Avoid exposure to cigarette smoke.      Plan of care reviewed with patient at the conclusion of today's visit. Education was provided regarding diagnosis, management and any prescribed or recommended OTC medications.  Patient verbalizes understanding of and agreement with management plan.    Return if symptoms worsen or fail to improve.    *Note that portions of this note were completed with a voice recognition program.  Efforts were made to edit the dictation but occasionally words are transcribed.    SCOT Batista

## 2019-12-03 ENCOUNTER — OFFICE VISIT (OUTPATIENT)
Dept: INTERNAL MEDICINE | Facility: CLINIC | Age: 69
End: 2019-12-03

## 2019-12-03 VITALS
HEIGHT: 71 IN | HEART RATE: 64 BPM | WEIGHT: 178 LBS | BODY MASS INDEX: 24.92 KG/M2 | TEMPERATURE: 98 F | SYSTOLIC BLOOD PRESSURE: 130 MMHG | DIASTOLIC BLOOD PRESSURE: 68 MMHG

## 2019-12-03 DIAGNOSIS — H61.21 IMPACTED CERUMEN OF RIGHT EAR: ICD-10-CM

## 2019-12-03 DIAGNOSIS — H91.91 DECREASED HEARING OF RIGHT EAR: Primary | ICD-10-CM

## 2019-12-03 PROCEDURE — 99213 OFFICE O/P EST LOW 20 MIN: CPT | Performed by: PHYSICIAN ASSISTANT

## 2019-12-03 PROCEDURE — 69210 REMOVE IMPACTED EAR WAX UNI: CPT | Performed by: PHYSICIAN ASSISTANT

## 2019-12-03 RX ORDER — LANCETS
EACH MISCELLANEOUS
Refills: 6 | COMMUNITY
Start: 2019-10-14 | End: 2021-07-15

## 2019-12-03 RX ORDER — BLOOD SUGAR DIAGNOSTIC
STRIP MISCELLANEOUS
Refills: 3 | COMMUNITY
Start: 2019-09-27 | End: 2021-04-13 | Stop reason: SDUPTHER

## 2019-12-03 NOTE — PROGRESS NOTES
Ear Cerumen Removal  Date/Time: 12/3/2019 4:45 PM  Performed by: Cherie Calhoun PA-C  Authorized by: Cherie Calhoun PA-C     Anesthesia:  Local Anesthetic: none  Location details: right ear  Patient tolerance: Patient tolerated the procedure well with no immediate complications  Procedure type: instrumentation   Sedation:  Patient sedated: no

## 2019-12-03 NOTE — PROGRESS NOTES
"Patient Care Team:  Newton Cruz MD as PCP - General (Internal Medicine)  Tracy Romero APRN as PCP - Claims Attributed  Gisselle Izaguirre PA-C as Physician Assistant (Internal Medicine)    Chief Complaint::   Chief Complaint   Patient presents with   • Cerumen Impaction     denies pain, muffled sound      2  Subjective     HPI  Pt presents with new onset \"muffled\" hearing in right ear.  He has a history of cerumen impaction, sees ENT every three months.  He denies ear pain.          The following portions of the patient's history were reviewed and updated as appropriate: active problem list, medication list, allergies, social history    Review of Systems:   Review of Systems   Constitutional: Negative for chills, fatigue and fever.   HENT: Positive for hearing loss and tinnitus. Negative for congestion, ear discharge, ear pain and sinus pressure.    Eyes: Negative for blurred vision and visual disturbance.   Respiratory: Negative for cough, chest tightness, shortness of breath and wheezing.    Cardiovascular: Negative for chest pain and palpitations.   Gastrointestinal: Negative for abdominal pain, blood in stool and constipation.   Skin: Negative for color change.   Allergic/Immunologic: Negative for environmental allergies.   Neurological: Negative for dizziness, speech difficulty and headache.   Psychiatric/Behavioral: Negative for decreased concentration. The patient is not nervous/anxious.        Vital Signs  Vitals:    12/03/19 1610   BP: 130/68   BP Location: Left arm   Patient Position: Sitting   Cuff Size: Adult   Pulse: 64   Temp: 98 °F (36.7 °C)   TempSrc: Temporal   Weight: 80.7 kg (178 lb)   Height: 180.3 cm (70.98\")   PainSc: 0-No pain     Body mass index is 24.84 kg/m².    Labs  No visits with results within 3 Month(s) from this visit.   Latest known visit with results is:   Admission on 02/26/2018, Discharged on 02/27/2018   Component Date Value Ref Range Status   • Influenza A Ag, EIA " 02/26/2018 Negative  Negative Final   • Influenza B Ag, EIA 02/26/2018 Negative  Negative Final   • Glucose 02/26/2018 147* 70 - 100 mg/dL Final   • BUN 02/26/2018 20  9 - 23 mg/dL Final   • Creatinine 02/26/2018 0.90  0.60 - 1.30 mg/dL Final   • Sodium 02/26/2018 138  132 - 146 mmol/L Final   • Potassium 02/26/2018 3.8  3.5 - 5.5 mmol/L Final   • Chloride 02/26/2018 108  99 - 109 mmol/L Final   • CO2 02/26/2018 21.0  20.0 - 31.0 mmol/L Final   • Calcium 02/26/2018 8.9  8.7 - 10.4 mg/dL Final   • Total Protein 02/26/2018 6.6  5.7 - 8.2 g/dL Final   • Albumin 02/26/2018 4.20  3.20 - 4.80 g/dL Final   • ALT (SGPT) 02/26/2018 27  7 - 40 U/L Final   • AST (SGOT) 02/26/2018 18  0 - 33 U/L Final   • Alkaline Phosphatase 02/26/2018 74  25 - 100 U/L Final   • Total Bilirubin 02/26/2018 0.5  0.3 - 1.2 mg/dL Final   • eGFR Non African Amer 02/26/2018 85  >60 mL/min/1.73 Final   • Globulin 02/26/2018 2.4  gm/dL Final   • A/G Ratio 02/26/2018 1.8  1.5 - 2.5 g/dL Final   • BUN/Creatinine Ratio 02/26/2018 22.2  7.0 - 25.0 Final   • Anion Gap 02/26/2018 9.0  3.0 - 11.0 mmol/L Final   • WBC 02/26/2018 10.67  3.50 - 10.80 10*3/mm3 Final   • RBC 02/26/2018 4.81  4.20 - 5.76 10*6/mm3 Final   • Hemoglobin 02/26/2018 14.4  13.1 - 17.5 g/dL Final   • Hematocrit 02/26/2018 42.9  38.9 - 50.9 % Final   • MCV 02/26/2018 89.2  80.0 - 99.0 fL Final   • MCH 02/26/2018 29.9  27.0 - 31.0 pg Final   • MCHC 02/26/2018 33.6  32.0 - 36.0 g/dL Final   • RDW 02/26/2018 12.8  11.3 - 14.5 % Final   • RDW-SD 02/26/2018 41.9  37.0 - 54.0 fl Final   • MPV 02/26/2018 10.7  6.0 - 12.0 fL Final   • Platelets 02/26/2018 216  150 - 450 10*3/mm3 Final   • Neutrophil % 02/26/2018 88.9* 41.0 - 71.0 % Final   • Lymphocyte % 02/26/2018 4.0* 24.0 - 44.0 % Final   • Monocyte % 02/26/2018 6.7  0.0 - 12.0 % Final   • Eosinophil % 02/26/2018 0.1  0.0 - 3.0 % Final   • Basophil % 02/26/2018 0.1  0.0 - 1.0 % Final   • Immature Grans % 02/26/2018 0.2  0.0 - 0.6 % Final   •  Neutrophils, Absolute 02/26/2018 9.49* 1.50 - 8.30 10*3/mm3 Final   • Lymphocytes, Absolute 02/26/2018 0.43* 0.60 - 4.80 10*3/mm3 Final   • Monocytes, Absolute 02/26/2018 0.71  0.00 - 1.00 10*3/mm3 Final   • Eosinophils, Absolute 02/26/2018 0.01  0.00 - 0.30 10*3/mm3 Final   • Basophils, Absolute 02/26/2018 0.01  0.00 - 0.20 10*3/mm3 Final   • Immature Grans, Absolute 02/26/2018 0.02  0.00 - 0.03 10*3/mm3 Final       Imaging  No radiology results for the last 30 days.      Current Outpatient Medications:   •  alfuzosin (UROXATRAL) 10 MG 24 hr tablet, Take 10 mg by mouth Daily., Disp: , Rfl:   •  aspirin 81 MG tablet, Take 81 mg by mouth Daily., Disp: , Rfl:   •  atorvastatin (LIPITOR) 20 MG tablet, Take 1 tablet by mouth Daily., Disp: 90 tablet, Rfl: 1  •  DiphenhydrAMINE HCl, Sleep, (ZZZQUIL PO), Take As Directed., Disp: , Rfl:   •  Hydrocortisone Acetate (PROCTOSOL RE), Insert 1 application into the rectum As Needed., Disp: , Rfl:   •  JARDIANCE 25 MG tablet, Take 1 tablet by mouth Daily., Disp: , Rfl:   •  metFORMIN (GLUCOPHAGE) 1000 MG tablet, Take 1,000 mg by mouth 2 (Two) Times a Day With Meals., Disp: , Rfl: 0  •  Multiple Vitamins-Minerals (MULTIVITAMIN ADULTS 50+ PO), Take 1 tablet by mouth Daily., Disp: , Rfl:   •  tadalafil (CIALIS) 5 MG tablet, Take 1 tablet by mouth As Needed for erectile dysfunction., Disp: 30 tablet, Rfl: 5  •  ACCU-CHEK FASTCLIX LANCETS misc, TEST BLOOD GLUCOSE BID, Disp: , Rfl: 6  •  ACCU-CHEK GUIDE test strip, CHECK BLOOD GLUCOSE 2 TIMES A DAY, Disp: , Rfl: 3    Physical Exam:    Physical Exam   Constitutional: He appears well-developed and well-nourished.   HENT:   Head: Normocephalic and atraumatic.   Right Ear: External ear normal. No foreign bodies. cerumen impaction is present.  Left Ear: External ear normal. An impacted cerumen is not present.  Nose: Nose normal.   Mouth/Throat: Oropharynx is clear and moist.   Eyes: Conjunctivae and EOM are normal. Pupils are equal, round,  and reactive to light.   Neck: Normal range of motion. Neck supple.   Cardiovascular: Normal rate, regular rhythm, normal heart sounds and intact distal pulses.   Pulmonary/Chest: Effort normal and breath sounds normal.   Skin: Skin is warm and dry.   Psychiatric: He has a normal mood and affect. His behavior is normal. Judgment and thought content normal.   Nursing note and vitals reviewed.      Procedures        Assessment/Plan   Problem List Items Addressed This Visit        Nervous and Auditory    Impacted cerumen of right ear    Current Assessment & Plan     Continue routine Debrox as instructed by ENT.         Relevant Orders    Ear Cerumen Removal      Other Visit Diagnoses     Decreased hearing of right ear    -  Primary    Relevant Orders    Ear Cerumen Removal          Return if symptoms worsen or fail to improve.    Plan of care reviewed with patient at the conclusion of today's visit. Education was provided regarding diagnosis, management, and any prescribed or recommended OTC medications.Patient verbalizes understanding of and agreement with management plan.       Cherie Calhoun PA-C    Please note that portions of this note were completed with a voice recognition program. Efforts were made to edit the dictations, but occasionally words are mistranscribed.

## 2019-12-06 PROBLEM — H61.21 IMPACTED CERUMEN OF RIGHT EAR: Status: ACTIVE | Noted: 2019-12-06

## 2019-12-17 ENCOUNTER — OFFICE VISIT (OUTPATIENT)
Dept: INTERNAL MEDICINE | Facility: CLINIC | Age: 69
End: 2019-12-17

## 2019-12-17 VITALS
SYSTOLIC BLOOD PRESSURE: 120 MMHG | BODY MASS INDEX: 23.43 KG/M2 | HEIGHT: 72 IN | HEART RATE: 72 BPM | WEIGHT: 173 LBS | DIASTOLIC BLOOD PRESSURE: 68 MMHG

## 2019-12-17 DIAGNOSIS — R41.3 MEMORY LOSS: ICD-10-CM

## 2019-12-17 DIAGNOSIS — Z00.00 ANNUAL PHYSICAL EXAM: ICD-10-CM

## 2019-12-17 DIAGNOSIS — E11.9 TYPE 2 DIABETES MELLITUS WITHOUT COMPLICATION, WITHOUT LONG-TERM CURRENT USE OF INSULIN (HCC): ICD-10-CM

## 2019-12-17 DIAGNOSIS — I10 ESSENTIAL HYPERTENSION: Primary | ICD-10-CM

## 2019-12-17 DIAGNOSIS — E78.2 MIXED HYPERLIPIDEMIA: ICD-10-CM

## 2019-12-17 LAB
POC CREATININE URINE: NORMAL
POC MICROALBUMIN URINE: NORMAL

## 2019-12-17 PROCEDURE — 82044 UR ALBUMIN SEMIQUANTITATIVE: CPT | Performed by: INTERNAL MEDICINE

## 2019-12-17 PROCEDURE — G0439 PPPS, SUBSEQ VISIT: HCPCS | Performed by: INTERNAL MEDICINE

## 2019-12-17 NOTE — PROGRESS NOTES
QUICK REFERENCE INFORMATION:  The ABCs of the Annual Wellness Visit    Subsequent Medicare Wellness Visit    HEALTH RISK ASSESSMENT    1950    Recent Hospitalizations:  No hospitalization(s) within the last year..        Current Medical Providers:  Patient Care Team:  Newton Cruz MD as PCP - General (Internal Medicine)  Tracy Romero APRN as PCP - Claims Attributed  Gisselle Izaguirre PA-C as Physician Assistant (Internal Medicine)        Smoking Status:  Social History     Tobacco Use   Smoking Status Never Smoker   Smokeless Tobacco Never Used       Alcohol Consumption:  Social History     Substance and Sexual Activity   Alcohol Use Yes   • Types: 1 Cans of beer per week       Depression Screen:   PHQ-2/PHQ-9 Depression Screening 12/17/2019   Little interest or pleasure in doing things 0   Feeling down, depressed, or hopeless 0   Total Score 0       Health Habits and Functional and Cognitive Screening:  Functional & Cognitive Status 12/17/2019   Do you have difficulty preparing food and eating? No   Do you have difficulty bathing yourself, getting dressed or grooming yourself? No   Do you have difficulty using the toilet? No   Do you have difficulty moving around from place to place? No   Do you have trouble with steps or getting out of a bed or a chair? No   Current Diet Well Balanced Diet   Dental Exam Up to date   Eye Exam Up to date   Exercise (times per week) 3 times per week   Current Exercise Activities Include Aerobics   Do you need help using the phone?  No   Are you deaf or do you have serious difficulty hearing?  No   Do you need help with transportation? No   Do you need help shopping? No   Do you need help preparing meals?  No   Do you need help with housework?  No   Do you need help with laundry? No   Do you need help taking your medications? No   Do you need help managing money? No   Do you ever drive or ride in a car without wearing a seat belt? No   Have you felt unusual stress,  anger or loneliness in the last month? No   Who do you live with? Spouse   If you need help, do you have trouble finding someone available to you? No   Have you been bothered in the last four weeks by sexual problems? No   Do you have difficulty concentrating, remembering or making decisions? No       Fall Risk Screen:  HENNA Fall Risk Assessment was completed, and patient is at LOW risk for falls.Assessment completed on:12/17/2019    ACE III MINI        Does the patient have evidence of cognitive impairment? Yes    Aspirin use counseling: Taking ASA appropriately as indicated    Recent Lab Results:  CMP:  Lab Results   Component Value Date    BUN 20 02/26/2018    CREATININE 0.90 02/26/2018    EGFRIFNONA 85 02/26/2018    BCR 22.2 02/26/2018     02/26/2018    K 3.8 02/26/2018    CO2 21.0 02/26/2018    CALCIUM 8.9 02/26/2018    ALBUMIN 4.20 02/26/2018    BILITOT 0.5 02/26/2018    ALKPHOS 74 02/26/2018    AST 18 02/26/2018    ALT 27 02/26/2018     HbA1c:  No results found for: HGBA1C  Microalbumin:  No results found for: MICROALBUR, POCMALB, POCCREAT  Lipid Panel  Lab Results   Component Value Date    AST 18 02/26/2018    ALT 27 02/26/2018       Visual Acuity:  No exam data present    Age-appropriate Screening Schedule:  Refer to the list below for future screening recommendations based on patient's age, sex and/or medical conditions. Orders for these recommended tests are listed in the plan section. The patient has been provided with a written plan.    Health Maintenance   Topic Date Due   • URINE MICROALBUMIN  1950   • DIABETIC FOOT EXAM  01/24/2019   • HEMOGLOBIN A1C  05/09/2019   • DIABETIC EYE EXAM  10/01/2019   • LIPID PANEL  11/09/2019   • COLONOSCOPY  11/14/2021   • PNEUMOCOCCAL VACCINE (65+ HIGH RISK) (2 of 2 - PPSV23) 01/13/2022   • TDAP/TD VACCINES (3 - Td) 05/08/2024   • INFLUENZA VACCINE  Completed   • ZOSTER VACCINE  Completed        Subjective   History of Present Illness    Francisco ALVARADO  Carlos is a 69 y.o. male who presents for a Subsequent Wellness Visit.  Overall he is doing very well.  He redouble his efforts with diet, and his last A1c was 7.1.  His only complaint today is waking up at 4 AM and not being able to get back to sleep.  He has no problems getting to sleep.  He wakes up to urinate 1 time a night and then cannot get back to sleep.  He is taking Z quill but only after he can get back to sleep.    CHRONIC CONDITIONS    The following portions of the patient's history were reviewed and updated as appropriate: allergies, current medications, past family history, past medical history, past social history, past surgical history and problem list.    Outpatient Medications Prior to Visit   Medication Sig Dispense Refill   • ACCU-CHEK FASTCLIX LANCETS misc TEST BLOOD GLUCOSE BID  6   • ACCU-CHEK GUIDE test strip CHECK BLOOD GLUCOSE 2 TIMES A DAY  3   • alfuzosin (UROXATRAL) 10 MG 24 hr tablet Take 10 mg by mouth Daily.     • APPLE CIDER VINEGAR PO Take 480 mg by mouth Daily.     • aspirin 81 MG tablet Take 81 mg by mouth Daily.     • atorvastatin (LIPITOR) 20 MG tablet Take 1 tablet by mouth Daily. 90 tablet 1   • DiphenhydrAMINE HCl, Sleep, (ZZZQUIL PO) Take As Directed.     • Hydrocortisone Acetate (PROCTOSOL RE) Insert 1 application into the rectum As Needed.     • JARDIANCE 25 MG tablet Take 1 tablet by mouth Daily.     • metFORMIN (GLUCOPHAGE) 1000 MG tablet Take 1,000 mg by mouth 2 (Two) Times a Day With Meals.  0   • Multiple Vitamins-Minerals (MULTIVITAMIN ADULTS 50+ PO) Take 1 tablet by mouth Daily.     • tadalafil (CIALIS) 5 MG tablet Take 1 tablet by mouth As Needed for erectile dysfunction. 30 tablet 5     No facility-administered medications prior to visit.        Patient Active Problem List   Diagnosis   • Exertional chest pain   • Essential hypertension   • Mixed hyperlipidemia   • Asthma   • Sensorineural hearing loss (SNHL) of both ears   • Allergic rhinitis   • Acute  prostatitis   • Diabetes mellitus, type II (CMS/HCC)   • Hypertrophy of prostate without urinary obstruction   • Anxiety state   • Annual physical exam   • Atypical chest pain   • Impacted cerumen of right ear       Advance Care Planning:  Patient has an advance directive - a copy has been provided and is visible in patient header    Identification of Risk Factors:  Risk factors include: Cardiovascular risk.    Review of Systems   Constitutional: Negative for chills, fatigue and fever.   HENT: Negative for congestion, ear pain and sinus pressure.    Respiratory: Negative for cough, chest tightness, shortness of breath and wheezing.    Cardiovascular: Negative for chest pain and palpitations.   Gastrointestinal: Negative for abdominal pain, blood in stool and constipation.   Skin: Negative for color change.   Allergic/Immunologic: Negative for environmental allergies.   Neurological: Negative for dizziness, speech difficulty and headaches.   Psychiatric/Behavioral: Negative for confusion. The patient is not nervous/anxious.        Compared to one year ago, the patient feels his physical health is the same.  Compared to one year ago, the patient feels his mental health is the same.    Objective     Physical Exam   Constitutional: He is oriented to person, place, and time. He appears well-developed and well-nourished.   HENT:   Head: Normocephalic and atraumatic.   Right Ear: External ear normal.   Left Ear: External ear normal.   Nose: Nose normal.   Mouth/Throat: Oropharynx is clear and moist. No oropharyngeal exudate.   Eyes: Pupils are equal, round, and reactive to light. Conjunctivae and EOM are normal.   Neck: Normal range of motion. Neck supple. No JVD present. No thyromegaly present.   Cardiovascular: Normal rate, regular rhythm, normal heart sounds and intact distal pulses. Exam reveals no gallop and no friction rub.   No murmur heard.  Pulmonary/Chest: Effort normal and breath sounds normal. No respiratory  "distress. He has no wheezes. He has no rales. He exhibits no tenderness.   Abdominal: Soft. Bowel sounds are normal. He exhibits no distension and no mass. There is no tenderness. There is no rebound and no guarding. No hernia.   Musculoskeletal: Normal range of motion. He exhibits no tenderness.    Francisco had a diabetic foot exam performed today.   During the foot exam he had a monofilament test performed (Sensation in both feet intact to monofilament).  Vascular Status -  His right foot exhibits normal foot vasculature  and no edema. His left foot exhibits normal foot vasculature  and no edema.  Skin Integrity  -  His right foot skin is intact.His left foot skin is intact..  Lymphadenopathy:     He has no cervical adenopathy.   Neurological: He is alert and oriented to person, place, and time. He displays normal reflexes. No cranial nerve deficit or sensory deficit. He exhibits normal muscle tone. Coordination normal.   Skin: Skin is warm and dry. No rash noted. No erythema.   Scattered lentigines and seborrheic keratoses   Psychiatric: He has a normal mood and affect. His behavior is normal. Judgment and thought content normal.   Nursing note and vitals reviewed.       Procedures     Vitals:    12/17/19 0917   BP: 120/68   BP Location: Right arm   Patient Position: Sitting   Cuff Size: Adult   Pulse: 72   Weight: 78.5 kg (173 lb)   Height: 182.9 cm (72\")   PainSc: 0-No pain       Patient's Body mass index is 23.46 kg/m². BMI is within normal parameters. No follow-up required..      Assessment/Plan   Problem List Items Addressed This Visit        Cardiovascular and Mediastinum    Essential hypertension - Primary    Relevant Orders    CBC & Differential    Comprehensive Metabolic Panel    Mixed hyperlipidemia    Relevant Medications    atorvastatin (LIPITOR) 20 MG tablet    Other Relevant Orders    Lipid Panel       Endocrine    Diabetes mellitus, type II (CMS/[facility])    Relevant Medications    metFORMIN (GLUCOPHAGE) " 1000 MG tablet    JARDIANCE 25 MG tablet    Other Relevant Orders    POC Microalbumin       Other    Annual physical exam      Other Visit Diagnoses     Memory loss        Relevant Orders    Vitamin B12    TSH        Patient Self-Management and Personalized Health Advice  The patient has been provided with information about: diet, exercise and prevention of cardiac or vascular disease and preventive services including:   · Annual Wellness Visit (AWV).    Outpatient Encounter Medications as of 12/17/2019   Medication Sig Dispense Refill   • ACCU-CHEK FASTCLIX LANCETS misc TEST BLOOD GLUCOSE BID  6   • ACCU-CHEK GUIDE test strip CHECK BLOOD GLUCOSE 2 TIMES A DAY  3   • alfuzosin (UROXATRAL) 10 MG 24 hr tablet Take 10 mg by mouth Daily.     • APPLE CIDER VINEGAR PO Take 480 mg by mouth Daily.     • aspirin 81 MG tablet Take 81 mg by mouth Daily.     • atorvastatin (LIPITOR) 20 MG tablet Take 1 tablet by mouth Daily. 90 tablet 1   • DiphenhydrAMINE HCl, Sleep, (ZZZQUIL PO) Take As Directed.     • Hydrocortisone Acetate (PROCTOSOL RE) Insert 1 application into the rectum As Needed.     • JARDIANCE 25 MG tablet Take 1 tablet by mouth Daily.     • metFORMIN (GLUCOPHAGE) 1000 MG tablet Take 1,000 mg by mouth 2 (Two) Times a Day With Meals.  0   • Multiple Vitamins-Minerals (MULTIVITAMIN ADULTS 50+ PO) Take 1 tablet by mouth Daily.     • tadalafil (CIALIS) 5 MG tablet Take 1 tablet by mouth As Needed for erectile dysfunction. 30 tablet 5     No facility-administered encounter medications on file as of 12/17/2019.      Suggested that he either use Benadryl at bedtime instead of when he wakes up or a trial of melatonin or CBD oil to see if they allow him to get back to sleep better.  Colonoscopy was performed in 2016, due at 5 years.    Reviewed use of high risk medication in the elderly: yes  Reviewed for potential of harmful drug interactions in the elderly: yes    Follow Up:  Return in about 6 months (around 6/17/2020) for  follow up .     There are no Patient Instructions on file for this visit.    An After Visit Summary and PPPS with all of these plans were given to the patient.

## 2019-12-20 ENCOUNTER — LAB (OUTPATIENT)
Dept: LAB | Facility: HOSPITAL | Age: 69
End: 2019-12-20

## 2019-12-20 DIAGNOSIS — I10 ESSENTIAL HYPERTENSION: ICD-10-CM

## 2019-12-20 DIAGNOSIS — E78.2 MIXED HYPERLIPIDEMIA: ICD-10-CM

## 2019-12-20 DIAGNOSIS — R41.3 MEMORY LOSS: ICD-10-CM

## 2019-12-20 LAB
ALBUMIN SERPL-MCNC: 4.1 G/DL (ref 3.5–5.2)
ALBUMIN/GLOB SERPL: 1.8 G/DL
ALP SERPL-CCNC: 58 U/L (ref 39–117)
ALT SERPL W P-5'-P-CCNC: 31 U/L (ref 1–41)
ANION GAP SERPL CALCULATED.3IONS-SCNC: 13.6 MMOL/L (ref 5–15)
AST SERPL-CCNC: 20 U/L (ref 1–40)
BASOPHILS # BLD AUTO: 0.04 10*3/MM3 (ref 0–0.2)
BASOPHILS NFR BLD AUTO: 1 % (ref 0–1.5)
BILIRUB SERPL-MCNC: 0.5 MG/DL (ref 0.2–1.2)
BUN BLD-MCNC: 18 MG/DL (ref 8–23)
BUN/CREAT SERPL: 15.9 (ref 7–25)
CALCIUM SPEC-SCNC: 9.1 MG/DL (ref 8.6–10.5)
CHLORIDE SERPL-SCNC: 106 MMOL/L (ref 98–107)
CHOLEST SERPL-MCNC: 130 MG/DL (ref 0–200)
CO2 SERPL-SCNC: 23.4 MMOL/L (ref 22–29)
CREAT BLD-MCNC: 1.13 MG/DL (ref 0.76–1.27)
DEPRECATED RDW RBC AUTO: 42.6 FL (ref 37–54)
EOSINOPHIL # BLD AUTO: 0.41 10*3/MM3 (ref 0–0.4)
EOSINOPHIL NFR BLD AUTO: 9.8 % (ref 0.3–6.2)
ERYTHROCYTE [DISTWIDTH] IN BLOOD BY AUTOMATED COUNT: 12.8 % (ref 12.3–15.4)
GFR SERPL CREATININE-BSD FRML MDRD: 65 ML/MIN/1.73
GLOBULIN UR ELPH-MCNC: 2.3 GM/DL
GLUCOSE BLD-MCNC: 129 MG/DL (ref 65–99)
HCT VFR BLD AUTO: 47.8 % (ref 37.5–51)
HDLC SERPL-MCNC: 59 MG/DL (ref 40–60)
HGB BLD-MCNC: 15.6 G/DL (ref 13–17.7)
IMM GRANULOCYTES # BLD AUTO: 0 10*3/MM3 (ref 0–0.05)
IMM GRANULOCYTES NFR BLD AUTO: 0 % (ref 0–0.5)
LDLC SERPL CALC-MCNC: 57 MG/DL (ref 0–100)
LDLC/HDLC SERPL: 0.97 {RATIO}
LYMPHOCYTES # BLD AUTO: 1.31 10*3/MM3 (ref 0.7–3.1)
LYMPHOCYTES NFR BLD AUTO: 31.2 % (ref 19.6–45.3)
MCH RBC QN AUTO: 29.8 PG (ref 26.6–33)
MCHC RBC AUTO-ENTMCNC: 32.6 G/DL (ref 31.5–35.7)
MCV RBC AUTO: 91.2 FL (ref 79–97)
MONOCYTES # BLD AUTO: 0.41 10*3/MM3 (ref 0.1–0.9)
MONOCYTES NFR BLD AUTO: 9.8 % (ref 5–12)
NEUTROPHILS # BLD AUTO: 2.03 10*3/MM3 (ref 1.7–7)
NEUTROPHILS NFR BLD AUTO: 48.2 % (ref 42.7–76)
NRBC BLD AUTO-RTO: 0 /100 WBC (ref 0–0.2)
PLATELET # BLD AUTO: 217 10*3/MM3 (ref 140–450)
PMV BLD AUTO: 10.6 FL (ref 6–12)
POTASSIUM BLD-SCNC: 4.5 MMOL/L (ref 3.5–5.2)
PROT SERPL-MCNC: 6.4 G/DL (ref 6–8.5)
RBC # BLD AUTO: 5.24 10*6/MM3 (ref 4.14–5.8)
SODIUM BLD-SCNC: 143 MMOL/L (ref 136–145)
TRIGL SERPL-MCNC: 69 MG/DL (ref 0–150)
TSH SERPL DL<=0.05 MIU/L-ACNC: 2.14 UIU/ML (ref 0.27–4.2)
VIT B12 BLD-MCNC: 1086 PG/ML (ref 211–946)
VLDLC SERPL-MCNC: 13.8 MG/DL (ref 5–40)
WBC NRBC COR # BLD: 4.2 10*3/MM3 (ref 3.4–10.8)

## 2019-12-20 PROCEDURE — 80061 LIPID PANEL: CPT

## 2019-12-20 PROCEDURE — 82607 VITAMIN B-12: CPT

## 2019-12-20 PROCEDURE — 80053 COMPREHEN METABOLIC PANEL: CPT

## 2019-12-20 PROCEDURE — 85025 COMPLETE CBC W/AUTO DIFF WBC: CPT

## 2019-12-20 PROCEDURE — 84443 ASSAY THYROID STIM HORMONE: CPT

## 2020-01-22 ENCOUNTER — PATIENT MESSAGE (OUTPATIENT)
Dept: INTERNAL MEDICINE | Facility: CLINIC | Age: 70
End: 2020-01-22

## 2020-02-14 RX ORDER — EMOLLIENT BASE
CREAM (GRAM) TOPICAL
Qty: 30 G | Refills: 1 | Status: SHIPPED | OUTPATIENT
Start: 2020-02-14 | End: 2022-02-18 | Stop reason: SDUPTHER

## 2020-02-25 ENCOUNTER — PATIENT MESSAGE (OUTPATIENT)
Dept: INTERNAL MEDICINE | Facility: CLINIC | Age: 70
End: 2020-02-25

## 2020-02-25 RX ORDER — ATORVASTATIN CALCIUM 20 MG/1
TABLET, FILM COATED ORAL
Qty: 90 TABLET | Refills: 1 | Status: SHIPPED | OUTPATIENT
Start: 2020-02-25 | End: 2020-05-26 | Stop reason: SDUPTHER

## 2020-02-25 NOTE — TELEPHONE ENCOUNTER
From: Francisco Gonzalez  To: Letitia Quinones APRN  Sent: 2/25/2020 10:34 AM EST  Subject: Prescription Question    Letitia, I am out of refills for my Atorvastatin. You're listed as the provider on my bottle. It's 20 mgqd. It goes to the [facility] in [place]. I think you all send electronically as their phones are down. Script has Francisco Gonzalez on the bottle. Thanks so much  Babak Gonzalez

## 2020-05-11 DIAGNOSIS — F41.1 ANXIETY STATE: Primary | ICD-10-CM

## 2020-05-11 RX ORDER — ALPRAZOLAM 0.5 MG/1
0.5 TABLET ORAL NIGHTLY PRN
Qty: 30 TABLET | Refills: 0 | Status: SHIPPED | OUTPATIENT
Start: 2020-05-11 | End: 2021-07-26 | Stop reason: SDUPTHER

## 2020-05-13 ENCOUNTER — LAB (OUTPATIENT)
Dept: LAB | Facility: HOSPITAL | Age: 70
End: 2020-05-13

## 2020-05-13 ENCOUNTER — TRANSCRIBE ORDERS (OUTPATIENT)
Dept: LAB | Facility: HOSPITAL | Age: 70
End: 2020-05-13

## 2020-05-13 DIAGNOSIS — IMO0002 TYPE II DIABETES MELLITUS WITH MANIFESTATIONS, UNCONTROLLED: ICD-10-CM

## 2020-05-13 DIAGNOSIS — IMO0002 TYPE II DIABETES MELLITUS WITH MANIFESTATIONS, UNCONTROLLED: Primary | ICD-10-CM

## 2020-05-13 LAB — HBA1C MFR BLD: 6.4 % (ref 4.8–5.6)

## 2020-05-13 PROCEDURE — 36415 COLL VENOUS BLD VENIPUNCTURE: CPT

## 2020-05-13 PROCEDURE — 83036 HEMOGLOBIN GLYCOSYLATED A1C: CPT

## 2020-05-18 ENCOUNTER — OFFICE VISIT (OUTPATIENT)
Dept: INTERNAL MEDICINE | Facility: CLINIC | Age: 70
End: 2020-05-18

## 2020-05-18 VITALS
DIASTOLIC BLOOD PRESSURE: 84 MMHG | SYSTOLIC BLOOD PRESSURE: 130 MMHG | TEMPERATURE: 98.2 F | BODY MASS INDEX: 22.94 KG/M2 | HEART RATE: 68 BPM | HEIGHT: 72 IN | WEIGHT: 169.4 LBS

## 2020-05-18 DIAGNOSIS — J30.1 SEASONAL ALLERGIC RHINITIS DUE TO POLLEN: ICD-10-CM

## 2020-05-18 DIAGNOSIS — H61.22 IMPACTED CERUMEN OF LEFT EAR: Primary | ICD-10-CM

## 2020-05-18 PROCEDURE — 99214 OFFICE O/P EST MOD 30 MIN: CPT | Performed by: PHYSICIAN ASSISTANT

## 2020-05-18 RX ORDER — FLUTICASONE PROPIONATE 50 MCG
2 SPRAY, SUSPENSION (ML) NASAL DAILY
Qty: 1 BOTTLE | Refills: 5 | Status: SHIPPED | OUTPATIENT
Start: 2020-05-18

## 2020-05-18 NOTE — PROGRESS NOTES
"Patient Care Team:  Newton Cruz MD as PCP - General (Internal Medicine)  Tracy Romero APRN as PCP - Claims Attributed  Gisselle Izaguirre PA-C as Physician Assistant (Internal Medicine)    Chief Complaint::   Chief Complaint   Patient presents with   • Cerumen Impaction     left ear.  Decreased hearing        Subjective     HPI  69-year-old male presents today for decreased hearing and congestion in his left ear.  He has a history of cerumen impaction and seasonal allergies.  He denies ear pain, fever, or body aches.  He does continue to have congestion in his left maxillary sinus and left ear.        The following portions of the patient's history were reviewed and updated as appropriate: active problem list, medication list, allergies, family history, social history    Review of Systems:   Review of Systems   Constitutional: Negative for chills and fever.   HENT: Positive for congestion and hearing loss. Negative for sore throat.    Eyes: Negative for blurred vision and double vision.   Respiratory: Negative for cough, shortness of breath and wheezing.    Cardiovascular: Negative for chest pain.   Gastrointestinal: Negative for abdominal pain.   Endocrine: Negative for cold intolerance and heat intolerance.   Skin: Negative for color change and dry skin.   Allergic/Immunologic: Positive for environmental allergies. Negative for food allergies and immunocompromised state.   Neurological: Negative for dizziness and headache.   Hematological: Negative for adenopathy. Does not bruise/bleed easily.       Vital Signs  Vitals:    05/18/20 1624   BP: 130/84   BP Location: Right arm   Patient Position: Sitting   Cuff Size: Adult   Pulse: 68   Temp: 98.2 °F (36.8 °C)   Weight: 76.8 kg (169 lb 6.4 oz)   Height: 182.9 cm (72\")   PainSc: 0-No pain     Body mass index is 22.97 kg/m².    Labs  Lab on 05/13/2020   Component Date Value Ref Range Status   • Hemoglobin A1C 05/13/2020 6.40* 4.80 - 5.60 % Final "       Imaging  No radiology results for the last 30 days.      Current Outpatient Medications:   •  ACCU-CHEK FASTCLIX LANCETS misc, TEST BLOOD GLUCOSE BID, Disp: , Rfl: 6  •  ACCU-CHEK GUIDE test strip, CHECK BLOOD GLUCOSE 2 TIMES A DAY, Disp: , Rfl: 3  •  alfuzosin (UROXATRAL) 10 MG 24 hr tablet, Take 10 mg by mouth Daily., Disp: , Rfl:   •  ALPRAZolam (XANAX) 0.5 MG tablet, Take 1 tablet by mouth At Night As Needed for Anxiety or Sleep., Disp: 30 tablet, Rfl: 0  •  APPLE CIDER VINEGAR PO, Take 480 mg by mouth Daily., Disp: , Rfl:   •  aspirin 81 MG tablet, Take 81 mg by mouth Daily., Disp: , Rfl:   •  atorvastatin (LIPITOR) 20 MG tablet, TAKE 1 TABLET BY MOUTH ONCE DAILY, Disp: 90 tablet, Rfl: 1  •  Cream Base cream, Hydrocortisone 2.5%-nitroglycerin 0.2% ointment: Apply a small amount to the appropriate area twice a day as needed, Disp: 30 g, Rfl: 1  •  DiphenhydrAMINE HCl, Sleep, (ZZZQUIL PO), Take As Directed., Disp: , Rfl:   •  Hydrocortisone Acetate (PROCTOSOL RE), Insert 1 application into the rectum As Needed., Disp: , Rfl:   •  JARDIANCE 25 MG tablet, Take 1 tablet by mouth Daily., Disp: , Rfl:   •  metFORMIN (GLUCOPHAGE) 1000 MG tablet, take 1 tablet by mouth twice a day with food, Disp: 180 tablet, Rfl: 1  •  Multiple Vitamins-Minerals (MULTIVITAMIN ADULTS 50+ PO), Take 1 tablet by mouth Daily., Disp: , Rfl:   •  tadalafil (CIALIS) 5 MG tablet, Take 1 tablet by mouth As Needed for erectile dysfunction., Disp: 30 tablet, Rfl: 5  •  fluticasone (Flonase) 50 MCG/ACT nasal spray, 2 sprays into the nostril(s) as directed by provider Daily., Disp: 1 bottle, Rfl: 5    Physical Exam:    Physical Exam   Constitutional: He is oriented to person, place, and time. He appears well-developed and well-nourished.   HENT:   Head: Normocephalic and atraumatic.   Right Ear: External ear normal.   Left Ear: External ear normal. An impacted cerumen is present.  Nose: Nose normal.   Eyes: Pupils are equal, round, and  reactive to light. Conjunctivae and EOM are normal.   Neck: Normal range of motion. Neck supple.   Cardiovascular: Normal rate, regular rhythm, normal heart sounds and intact distal pulses.   Pulmonary/Chest: Effort normal and breath sounds normal.   Neurological: He is alert and oriented to person, place, and time.   Skin: Skin is warm and dry.   Psychiatric: He has a normal mood and affect. His behavior is normal. Judgment and thought content normal.   Nursing note and vitals reviewed.      Ear Cerumen Removal  Date/Time: 5/19/2020 11:43 AM  Performed by: Cherie Calhoun PA-C  Authorized by: Cherie Calhoun PA-C     Anesthesia:  Local Anesthetic: none  Location details: left ear  Patient tolerance: Patient tolerated the procedure well with no immediate complications  Comments: Performed by Gia Winn with curette  Procedure type: instrumentation   Sedation:  Patient sedated: no                Assessment/Plan   Problem List Items Addressed This Visit        Respiratory    Allergic rhinitis    Current Assessment & Plan     Fluticasone nasal spray twice daily.           Relevant Medications    fluticasone (Flonase) 50 MCG/ACT nasal spray       Nervous and Auditory    Impacted cerumen of left ear - Primary    Current Assessment & Plan     Removed without difficulty.         Relevant Orders    Ear Cerumen Removal          Return for Next scheduled follow up.    Plan of care reviewed with patient at the conclusion of today's visit. Education was provided regarding diagnosis, management, and any prescribed or recommended OTC medications.Patient verbalizes understanding of and agreement with management plan.       Cherie Calhoun PA-C    Please note that portions of this note were completed with a voice recognition program. Efforts were made to edit the dictations, but occasionally words are mistranscribed.  Answers for HPI/ROS submitted by the patient on 5/18/2020   What is the primary  reason for your visit?: Other  Please describe your symptoms.: Excess wax build up in left ear causing hearing loss.  Have you had these symptoms before?: Yes  How long have you been having these symptoms?: 1-4 days  Please list any medications you are currently taking for this condition.: Debrox drops  Please describe any probable cause for these symptoms. : Hearing aid

## 2020-05-19 PROBLEM — H61.22 IMPACTED CERUMEN OF LEFT EAR: Status: ACTIVE | Noted: 2020-05-19

## 2020-05-19 PROCEDURE — 69210 REMOVE IMPACTED EAR WAX UNI: CPT | Performed by: PHYSICIAN ASSISTANT

## 2020-05-26 RX ORDER — ATORVASTATIN CALCIUM 20 MG/1
20 TABLET, FILM COATED ORAL DAILY
Qty: 90 TABLET | Refills: 1 | Status: SHIPPED | OUTPATIENT
Start: 2020-05-26 | End: 2021-02-15 | Stop reason: SDUPTHER

## 2020-06-11 ENCOUNTER — TELEPHONE (OUTPATIENT)
Dept: INTERNAL MEDICINE | Facility: CLINIC | Age: 70
End: 2020-06-11

## 2020-06-11 DIAGNOSIS — I10 ESSENTIAL HYPERTENSION: Primary | ICD-10-CM

## 2020-06-11 DIAGNOSIS — E78.2 MIXED HYPERLIPIDEMIA: ICD-10-CM

## 2020-06-11 RX ORDER — CYCLOBENZAPRINE HCL 10 MG
10 TABLET ORAL 2 TIMES DAILY PRN
Qty: 60 TABLET | Refills: 1 | Status: SHIPPED | OUTPATIENT
Start: 2020-06-11 | End: 2020-06-11

## 2020-06-11 RX ORDER — BACLOFEN 10 MG/1
10 TABLET ORAL 2 TIMES DAILY
Qty: 30 TABLET | Refills: 1 | Status: SHIPPED | OUTPATIENT
Start: 2020-06-11 | End: 2020-08-14

## 2020-06-11 NOTE — TELEPHONE ENCOUNTER
PT CALLED STATED THAT PHARMACY DOES NOT HAVE RX cyclobenzaprine (FLEXERIL) 10 MG tablet, BUT HAS BACLOFEN 10 MG TO REPLACE.    PLEASE ADVISE.  CALL BACK:[phone]       [id number] [place], [place] - [address] AT [facility] [address]

## 2020-06-15 ENCOUNTER — PRIOR AUTHORIZATION (OUTPATIENT)
Dept: INTERNAL MEDICINE | Facility: CLINIC | Age: 70
End: 2020-06-15

## 2020-06-15 ENCOUNTER — LAB (OUTPATIENT)
Dept: LAB | Facility: HOSPITAL | Age: 70
End: 2020-06-15

## 2020-06-15 DIAGNOSIS — E78.2 MIXED HYPERLIPIDEMIA: ICD-10-CM

## 2020-06-15 DIAGNOSIS — I10 ESSENTIAL HYPERTENSION: ICD-10-CM

## 2020-06-15 LAB
ALBUMIN SERPL-MCNC: 4.1 G/DL (ref 3.5–5.2)
ALBUMIN/GLOB SERPL: 2.3 G/DL
ALP SERPL-CCNC: 62 U/L (ref 39–117)
ALT SERPL W P-5'-P-CCNC: 20 U/L (ref 1–41)
ANION GAP SERPL CALCULATED.3IONS-SCNC: 8.6 MMOL/L (ref 5–15)
AST SERPL-CCNC: 19 U/L (ref 1–40)
BILIRUB SERPL-MCNC: 0.5 MG/DL (ref 0.2–1.2)
BUN BLD-MCNC: 25 MG/DL (ref 8–23)
BUN/CREAT SERPL: 24.8 (ref 7–25)
CALCIUM SPEC-SCNC: 9 MG/DL (ref 8.6–10.5)
CHLORIDE SERPL-SCNC: 106 MMOL/L (ref 98–107)
CHOLEST SERPL-MCNC: 125 MG/DL (ref 0–200)
CO2 SERPL-SCNC: 25.4 MMOL/L (ref 22–29)
CREAT BLD-MCNC: 1.01 MG/DL (ref 0.76–1.27)
GFR SERPL CREATININE-BSD FRML MDRD: 74 ML/MIN/1.73
GLOBULIN UR ELPH-MCNC: 1.8 GM/DL
GLUCOSE BLD-MCNC: 131 MG/DL (ref 65–99)
HDLC SERPL-MCNC: 61 MG/DL (ref 40–60)
LDLC SERPL CALC-MCNC: 53 MG/DL (ref 0–100)
LDLC/HDLC SERPL: 0.87 {RATIO}
POTASSIUM BLD-SCNC: 4.5 MMOL/L (ref 3.5–5.2)
PROT SERPL-MCNC: 5.9 G/DL (ref 6–8.5)
SODIUM BLD-SCNC: 140 MMOL/L (ref 136–145)
TRIGL SERPL-MCNC: 56 MG/DL (ref 0–150)
VLDLC SERPL-MCNC: 11.2 MG/DL (ref 5–40)

## 2020-06-15 PROCEDURE — 80061 LIPID PANEL: CPT

## 2020-06-15 PROCEDURE — 80053 COMPREHEN METABOLIC PANEL: CPT

## 2020-06-17 ENCOUNTER — OFFICE VISIT (OUTPATIENT)
Dept: INTERNAL MEDICINE | Facility: CLINIC | Age: 70
End: 2020-06-17

## 2020-06-17 VITALS
WEIGHT: 170.2 LBS | DIASTOLIC BLOOD PRESSURE: 70 MMHG | BODY MASS INDEX: 23.05 KG/M2 | SYSTOLIC BLOOD PRESSURE: 132 MMHG | TEMPERATURE: 97.3 F | HEIGHT: 72 IN | HEART RATE: 72 BPM

## 2020-06-17 DIAGNOSIS — J30.1 SEASONAL ALLERGIC RHINITIS DUE TO POLLEN: ICD-10-CM

## 2020-06-17 DIAGNOSIS — I10 ESSENTIAL HYPERTENSION: Primary | ICD-10-CM

## 2020-06-17 DIAGNOSIS — E78.2 MIXED HYPERLIPIDEMIA: ICD-10-CM

## 2020-06-17 DIAGNOSIS — E11.9 TYPE 2 DIABETES MELLITUS WITHOUT COMPLICATION, WITHOUT LONG-TERM CURRENT USE OF INSULIN (HCC): ICD-10-CM

## 2020-06-17 PROCEDURE — 99214 OFFICE O/P EST MOD 30 MIN: CPT | Performed by: INTERNAL MEDICINE

## 2020-06-17 RX ORDER — ESCITALOPRAM OXALATE 10 MG/1
10 TABLET ORAL DAILY
Qty: 30 TABLET | Refills: 5 | Status: SHIPPED | OUTPATIENT
Start: 2020-06-17 | End: 2021-03-24

## 2020-06-17 NOTE — PROGRESS NOTES
[facility]     Francisco Gonzalez  1950   [id number]      Patient Care Team:  Newton Cruz MD as PCP - General (Internal Medicine)  Tracy Romero APRN as PCP - Claims Attributed  Gisselle Izaguirre PA-C as Physician Assistant (Internal Medicine)    Chief Complaint::   Chief Complaint   Patient presents with   • Hyperlipidemia   • Hypertension   • Diabetes        HPI  Mr. Gonzalez comes in for follow-up of his hypertension, hyperlipidemia and allergies.  He continues to see Dr. Gray for his diabetes which is now very well controlled with an A1c of 6.4.  He has worked very hard on diet to stay on insulin and now his weight is at an all-time low but he still feels well and has good strength.  There is no fever, cough, shortness of breath or chest pain.  He works out aggressively.    Chronic Conditions:      Patient Active Problem List   Diagnosis   • Exertional chest pain   • Essential hypertension   • Mixed hyperlipidemia   • Asthma   • Sensorineural hearing loss (SNHL) of both ears   • Allergic rhinitis   • Acute prostatitis   • Diabetes mellitus, type II (CMS/HCC)   • Hypertrophy of prostate without urinary obstruction   • Anxiety state   • Annual physical exam   • Atypical chest pain   • Impacted cerumen of right ear   • Impacted cerumen of left ear        Past Medical History:   Diagnosis Date   • Anxiety    • Chicken pox    • Colonic polyp    • Hydrocele 2001    left; repaired   • Hyperlipidemia    • Measles    • Quadriceps tendon rupture 2010   • Type 2 diabetes mellitus (CMS/HCC)        Past Surgical History:   Procedure Laterality Date   • APPENDECTOMY  1986   • HYDROCELE EXCISION / REPAIR Left 2001   • KNEE SURGERY      left knee   • QUADRICEPS TENDON REPAIR  2010    left quadracepts tendon rupture   • VASECTOMY         Family History   Problem Relation Age of Onset   • Hypertension Mother    • Heart attack Father         MI   • Coronary artery disease Father 59         premature   • No Known Problems Sister    • Hyperlipidemia Brother        Social History     Socioeconomic History   • Marital status:      Spouse name: Not on file   • Number of children: Not on file   • Years of education: Not on file   • Highest education level: Not on file   Tobacco Use   • Smoking status: Never Smoker   • Smokeless tobacco: Never Used   Substance and Sexual Activity   • Alcohol use: Yes     Types: 1 Cans of beer per week   • Drug use: No   • Sexual activity: Defer       No Known Allergies      Current Outpatient Medications:   •  ACCU-CHEK FASTCLIX LANCETS misc, TEST BLOOD GLUCOSE BID, Disp: , Rfl: 6  •  ACCU-CHEK GUIDE test strip, CHECK BLOOD GLUCOSE 2 TIMES A DAY, Disp: , Rfl: 3  •  alfuzosin (UROXATRAL) 10 MG 24 hr tablet, Take 10 mg by mouth Daily., Disp: , Rfl:   •  ALPRAZolam (XANAX) 0.5 MG tablet, Take 1 tablet by mouth At Night As Needed for Anxiety or Sleep., Disp: 30 tablet, Rfl: 0  •  APPLE CIDER VINEGAR PO, Take 480 mg by mouth Daily., Disp: , Rfl:   •  aspirin 81 MG tablet, Take 81 mg by mouth Daily., Disp: , Rfl:   •  atorvastatin (LIPITOR) 20 MG tablet, Take 1 tablet by mouth Daily., Disp: 90 tablet, Rfl: 1  •  baclofen (LIORESAL) 10 MG tablet, Take 1 tablet by mouth 2 (Two) Times a Day., Disp: 30 tablet, Rfl: 1  •  Cream Base cream, Hydrocortisone 2.5%-nitroglycerin 0.2% ointment: Apply a small amount to the appropriate area twice a day as needed, Disp: 30 g, Rfl: 1  •  DiphenhydrAMINE HCl, Sleep, (ZZZQUIL PO), Take As Directed., Disp: , Rfl:   •  fluticasone (Flonase) 50 MCG/ACT nasal spray, 2 sprays into the nostril(s) as directed by provider Daily., Disp: 1 bottle, Rfl: 5  •  Hydrocortisone Acetate (PROCTOSOL RE), Insert 1 application into the rectum As Needed., Disp: , Rfl:   •  JARDIANCE 25 MG tablet, Take 1 tablet by mouth Daily., Disp: , Rfl:   •  metFORMIN (GLUCOPHAGE) 1000 MG tablet, take 1 tablet by mouth twice a day with food, Disp: 180 tablet, Rfl: 1  •   "Multiple Vitamins-Minerals (MULTIVITAMIN ADULTS 50+ PO), Take 1 tablet by mouth Daily., Disp: , Rfl:   •  tadalafil (CIALIS) 5 MG tablet, Take 1 tablet by mouth As Needed for erectile dysfunction., Disp: 30 tablet, Rfl: 5  •  escitalopram (LEXAPRO) 10 MG tablet, Take 1 tablet by mouth Daily., Disp: 30 tablet, Rfl: 5    Review of Systems   Constitutional: Negative for chills, fatigue and fever.   HENT: Negative for congestion, ear pain and sinus pressure.    Respiratory: Negative for cough, chest tightness, shortness of breath and wheezing.    Cardiovascular: Negative for chest pain and palpitations.   Gastrointestinal: Negative for abdominal pain, blood in stool and constipation.   Skin: Negative for color change.   Allergic/Immunologic: Negative for environmental allergies.   Neurological: Negative for dizziness, speech difficulty and headache.   Psychiatric/Behavioral: Negative for decreased concentration. The patient is not nervous/anxious.         Vital Signs  Vitals:    06/17/20 0939   BP: 132/70   BP Location: Right arm   Patient Position: Sitting   Cuff Size: Adult   Pulse: 72   Temp: 97.3 °F (36.3 °C)   Weight: 77.2 kg (170 lb 3.2 oz)   Height: 182.9 cm (72.01\")   PainSc:   2   PainLoc: Back       Physical Exam   Constitutional: He is oriented to person, place, and time. He appears well-developed and well-nourished.   HENT:   Head: Normocephalic and atraumatic.   Cardiovascular: Normal rate, regular rhythm and normal heart sounds.   No murmur heard.  Pulmonary/Chest: Effort normal and breath sounds normal.   Neurological: He is alert and oriented to person, place, and time.   Psychiatric: He has a normal mood and affect.   Vitals reviewed.     Procedures    ACE III MINI             Assessment/Plan:    Francisco was seen today for hyperlipidemia, hypertension and diabetes.    Diagnoses and all orders for this visit:    Essential hypertension    Mixed hyperlipidemia    Type 2 diabetes mellitus without " complication, without long-term current use of insulin (CMS/[facility])    Seasonal allergic rhinitis due to pollen    Other orders  -     escitalopram (LEXAPRO) 10 MG tablet; Take 1 tablet by mouth Daily.    Plan    Blood pressure is very well controlled with nonpharmacologic therapy.    Lipids are well controlled, continue atorvastatin and healthy diet.    Allergies are well controlled on as needed over-the-counter nasal steroids.    He asked to have his Lexapro renewed because he has been more anxious recently.  He will take 5 mg a day.      Plan of care reviewed with patient at the conclusion of today's visit. Education was provided regarding diagnosis, management, and any prescribed or recommended OTC medications.Patient verbalizes understanding of and agreement with management plan.         Newton Cruz MD           Answers for HPI/ROS submitted by the patient on 6/10/2020   What is the primary reason for your visit?: Physical

## 2020-06-25 ENCOUNTER — TELEPHONE (OUTPATIENT)
Dept: INTERNAL MEDICINE | Facility: CLINIC | Age: 70
End: 2020-06-25

## 2020-06-25 NOTE — TELEPHONE ENCOUNTER
----- Message from Newton Cruz MD sent at 6/25/2020  4:36 PM EDT -----  Please make sure copy of labs were sent to Dr Bourne.

## 2020-08-14 ENCOUNTER — OFFICE VISIT (OUTPATIENT)
Dept: INTERNAL MEDICINE | Facility: CLINIC | Age: 70
End: 2020-08-14

## 2020-08-14 VITALS
HEART RATE: 64 BPM | SYSTOLIC BLOOD PRESSURE: 128 MMHG | BODY MASS INDEX: 22.89 KG/M2 | DIASTOLIC BLOOD PRESSURE: 70 MMHG | HEIGHT: 72 IN | TEMPERATURE: 96.9 F | WEIGHT: 169 LBS

## 2020-08-14 DIAGNOSIS — H61.23 IMPACTED CERUMEN, BILATERAL: Primary | ICD-10-CM

## 2020-08-14 DIAGNOSIS — H91.8X3 OTHER SPECIFIED HEARING LOSS OF BOTH EARS: ICD-10-CM

## 2020-08-14 PROCEDURE — 69209 REMOVE IMPACTED EAR WAX UNI: CPT | Performed by: INTERNAL MEDICINE

## 2020-08-14 PROCEDURE — 99212 OFFICE O/P EST SF 10 MIN: CPT | Performed by: INTERNAL MEDICINE

## 2020-08-14 NOTE — PROGRESS NOTES
[facility]     Francisco Gonzalez  1950   [id number]      Patient Care Team:  Newton Cruz MD as PCP - General (Internal Medicine)  Jose Allen MD as PCP - Claims Attributed  Gisselle Izaguirre PA-C as Physician Assistant (Internal Medicine)    Chief Complaint::   Chief Complaint   Patient presents with   • Hearing Loss     right ear        HPI  Mr. Gonzalez comes in complaining of hearing loss in the right ear.  His ear has felt congested for several days.  Last week placed Debrox into the ear and since then has not been able to hear at all from his right ear.  There is no fever, pain or upper respiratory symptoms.    Chronic Conditions:      Patient Active Problem List   Diagnosis   • Exertional chest pain   • Essential hypertension   • Mixed hyperlipidemia   • Asthma   • Sensorineural hearing loss (SNHL) of both ears   • Allergic rhinitis   • Acute prostatitis   • Diabetes mellitus, type II (CMS/HCC)   • Hypertrophy of prostate without urinary obstruction   • Anxiety state   • Annual physical exam   • Atypical chest pain   • Impacted cerumen of right ear   • Impacted cerumen of left ear        Past Medical History:   Diagnosis Date   • Anxiety    • Chicken pox    • Colonic polyp    • Hydrocele 2001    left; repaired   • Hyperlipidemia    • Measles    • Quadriceps tendon rupture 2010   • Type 2 diabetes mellitus (CMS/HCC)        Past Surgical History:   Procedure Laterality Date   • APPENDECTOMY  1986   • HYDROCELE EXCISION / REPAIR Left 2001   • KNEE SURGERY      left knee   • QUADRICEPS TENDON REPAIR  2010    left quadracepts tendon rupture   • VASECTOMY         Family History   Problem Relation Age of Onset   • Hypertension Mother    • Heart attack Father         MI   • Coronary artery disease Father 59        premature   • No Known Problems Sister    • Hyperlipidemia Brother        Social History     Socioeconomic History   • Marital status:      Spouse name: Not  on file   • Number of children: Not on file   • Years of education: Not on file   • Highest education level: Not on file   Tobacco Use   • Smoking status: Never Smoker   • Smokeless tobacco: Never Used   Substance and Sexual Activity   • Alcohol use: Yes     Types: 1 Cans of beer per week   • Drug use: No   • Sexual activity: Defer       No Known Allergies      Current Outpatient Medications:   •  ACCU-CHEK FASTCLIX LANCETS misc, TEST BLOOD GLUCOSE BID, Disp: , Rfl: 6  •  ACCU-CHEK GUIDE test strip, CHECK BLOOD GLUCOSE 2 TIMES A DAY, Disp: , Rfl: 3  •  alfuzosin (UROXATRAL) 10 MG 24 hr tablet, Take 10 mg by mouth Daily., Disp: , Rfl:   •  ALPRAZolam (XANAX) 0.5 MG tablet, Take 1 tablet by mouth At Night As Needed for Anxiety or Sleep., Disp: 30 tablet, Rfl: 0  •  APPLE CIDER VINEGAR PO, Take 480 mg by mouth Daily., Disp: , Rfl:   •  aspirin 81 MG tablet, Take 81 mg by mouth Daily., Disp: , Rfl:   •  atorvastatin (LIPITOR) 20 MG tablet, Take 1 tablet by mouth Daily., Disp: 90 tablet, Rfl: 1  •  Cream Base cream, Hydrocortisone 2.5%-nitroglycerin 0.2% ointment: Apply a small amount to the appropriate area twice a day as needed, Disp: 30 g, Rfl: 1  •  DiphenhydrAMINE HCl, Sleep, (ZZZQUIL PO), Take As Directed., Disp: , Rfl:   •  escitalopram (LEXAPRO) 10 MG tablet, Take 1 tablet by mouth Daily. (Patient taking differently: Take 5 mg by mouth Daily.), Disp: 30 tablet, Rfl: 5  •  fluticasone (Flonase) 50 MCG/ACT nasal spray, 2 sprays into the nostril(s) as directed by provider Daily., Disp: 1 bottle, Rfl: 5  •  Hydrocortisone Acetate (PROCTOSOL RE), Insert 1 application into the rectum As Needed., Disp: , Rfl:   •  JARDIANCE 25 MG tablet, Take 1 tablet by mouth Daily., Disp: , Rfl:   •  metFORMIN (GLUCOPHAGE) 1000 MG tablet, take 1 tablet by mouth twice a day with food, Disp: 180 tablet, Rfl: 1  •  Multiple Vitamins-Minerals (MULTIVITAMIN ADULTS 50+ PO), Take 1 tablet by mouth Daily., Disp: , Rfl:   •  tadalafil (CIALIS)  "5 MG tablet, Take 1 tablet by mouth As Needed for erectile dysfunction., Disp: 30 tablet, Rfl: 5    Review of Systems   Constitutional: Negative for chills, fatigue and fever.   HENT: Positive for hearing loss. Negative for congestion, ear pain and sinus pressure.    Respiratory: Negative for cough, chest tightness, shortness of breath and wheezing.    Cardiovascular: Negative for chest pain and palpitations.   Gastrointestinal: Negative for abdominal pain, blood in stool and constipation.   Skin: Negative for color change.   Allergic/Immunologic: Negative for environmental allergies.   Neurological: Negative for dizziness, speech difficulty and headache.   Psychiatric/Behavioral: Negative for decreased concentration. The patient is not nervous/anxious.         Vital Signs  Vitals:    08/14/20 1051   BP: 128/70   BP Location: Right arm   Patient Position: Sitting   Cuff Size: Adult   Pulse: 64   Temp: 96.9 °F (36.1 °C)   Weight: 76.7 kg (169 lb)   Height: 182.9 cm (72.01\")   PainSc: 0-No pain       Physical Exam   Constitutional: He appears well-developed and well-nourished.   HENT:   Head: Normocephalic and atraumatic.   Right Ear: cerumen impaction is present.  Left Ear: An impacted cerumen is present.     Procedures    ACE III MINI             Assessment/Plan:    Francisco was seen today for hearing loss.    Diagnoses and all orders for this visit:    Other specified hearing loss of both ears    Plan    Hearing loss relieved by removal of earwax from both ears.      Plan of care reviewed with patient at the conclusion of today's visit. Education was provided regarding diagnosis, management, and any prescribed or recommended OTC medications.Patient verbalizes understanding of and agreement with management plan.         Newton Cruz MD           "

## 2020-08-14 NOTE — PROGRESS NOTES
Ear Cerumen Removal  Date/Time: 8/14/2020 4:08 PM  Performed by: Newton Cruz MD  Authorized by: Newton Cruz MD     Anesthesia:  Local Anesthetic: none  Location details: left ear and right ear  Patient tolerance: Patient tolerated the procedure well with no immediate complications  Procedure type: irrigation   Sedation:  Patient sedated: no

## 2020-10-21 ENCOUNTER — OFFICE VISIT (OUTPATIENT)
Dept: CARDIOLOGY | Facility: CLINIC | Age: 70
End: 2020-10-21

## 2020-10-21 VITALS
HEART RATE: 78 BPM | BODY MASS INDEX: 23.3 KG/M2 | WEIGHT: 172 LBS | DIASTOLIC BLOOD PRESSURE: 68 MMHG | HEIGHT: 72 IN | SYSTOLIC BLOOD PRESSURE: 112 MMHG

## 2020-10-21 DIAGNOSIS — E78.2 MIXED HYPERLIPIDEMIA: ICD-10-CM

## 2020-10-21 DIAGNOSIS — I10 ESSENTIAL HYPERTENSION: Primary | ICD-10-CM

## 2020-10-21 PROCEDURE — 99213 OFFICE O/P EST LOW 20 MIN: CPT | Performed by: INTERNAL MEDICINE

## 2020-10-21 NOTE — PROGRESS NOTES
[facility] Cardiology    Patient ID: Francisco Gonzalez is a 69 y.o. male.  : 1950   Contact: [phone]    Encounter date: 10/21/2020    PCP: Newton Cruz MD      Chief complaint:   Chief Complaint   Patient presents with   • Exertional chest pain       Problem List:  1.  Chest pain              A.  Cardiolite :  EF 63%, negative for ischemia    B.  Echocardiogram, 2017. EF: 60%. No significant ST or T wave changes noted. No evidence of inducible ischemia by clinical, electrocardiographic or echocardiographic criteria.  Expected exercise duration 8 minutes.  Actual exercise duration 13 minutes  2.  Hypertension  3.  Hyperlipidemia  4.  Diabetes              A.  Hgb A1c- 6.7   5.  Prostate    No Known Allergies    Current Medications:    Current Outpatient Medications:   •  ACCU-CHEK FASTCLIX LANCETS misc, TEST BLOOD GLUCOSE BID, Disp: , Rfl: 6  •  ACCU-CHEK GUIDE test strip, CHECK BLOOD GLUCOSE 2 TIMES A DAY, Disp: , Rfl: 3  •  alfuzosin (UROXATRAL) 10 MG 24 hr tablet, Take 10 mg by mouth Daily., Disp: , Rfl:   •  ALPRAZolam (XANAX) 0.5 MG tablet, Take 1 tablet by mouth At Night As Needed for Anxiety or Sleep., Disp: 30 tablet, Rfl: 0  •  APPLE CIDER VINEGAR PO, Take 480 mg by mouth Daily., Disp: , Rfl:   •  aspirin 81 MG tablet, Take 81 mg by mouth Daily., Disp: , Rfl:   •  atorvastatin (LIPITOR) 20 MG tablet, Take 1 tablet by mouth Daily., Disp: 90 tablet, Rfl: 1  •  Cream Base cream, Hydrocortisone 2.5%-nitroglycerin 0.2% ointment: Apply a small amount to the appropriate area twice a day as needed, Disp: 30 g, Rfl: 1  •  DiphenhydrAMINE HCl, Sleep, (ZZZQUIL PO), Take As Directed., Disp: , Rfl:   •  escitalopram (LEXAPRO) 10 MG tablet, Take 1 tablet by mouth Daily. (Patient taking differently: Take 5 mg by mouth Daily.), Disp: 30 tablet, Rfl: 5  •  fluticasone (Flonase) 50 MCG/ACT nasal spray, 2 sprays into the nostril(s) as directed by provider  "Daily., Disp: 1 bottle, Rfl: 5  •  Hydrocortisone Acetate (PROCTOSOL RE), Insert 1 application into the rectum As Needed., Disp: , Rfl:   •  JARDIANCE 25 MG tablet, Take 1 tablet by mouth Daily., Disp: , Rfl:   •  metFORMIN (GLUCOPHAGE) 1000 MG tablet, take 1 tablet by mouth twice a day with food, Disp: 180 tablet, Rfl: 1  •  Multiple Vitamins-Minerals (MULTIVITAMIN ADULTS 50+ PO), Take 1 tablet by mouth Daily., Disp: , Rfl:   •  tadalafil (CIALIS) 5 MG tablet, Take 1 tablet by mouth As Needed for erectile dysfunction., Disp: 30 tablet, Rfl: 5    HPI    Francisco Gonzalez is a 69 y.o. male who presents today for a 3 year follow up of exertional chest pain and cardiac risk factors. Since last visit, he has been feeling well from a cardiovascular standpoint. He has been exercising regularly by golfing and walking the course. He mentions that he has lost 30 pounds. He notes that he has trouble sleeping throughout the night and has been using Z-Quil and tylenol as a sleep aid. Patient otherwise denies chest pain, shortness of breath, PND, edema, palpitations, syncope, or presyncope at this time.       The following portions of the patient's history were reviewed and updated as appropriate: allergies, current medications and problem list.    Pertinent positives as listed in the HPI.  All other systems reviewed are negative.         Vitals:    10/21/20 1134   BP: 112/68   BP Location: Left arm   Patient Position: Sitting   Pulse: 78   Weight: 78 kg (172 lb)   Height: 182.9 cm (72\")       Physical Exam:  General: Alert and oriented.  Neck: Jugular venous pressure is within normal limits. Carotids have normal upstrokes without bruits.   Cardiovascular: Heart has a nondisplaced focal PMI. Regular rate and rhythm. No murmur, gallop or rub.  Lungs: Clear, no rales or wheezes. Equal expansion is noted.   Extremities: Show no edema.  Skin: Warm and dry.  Neurologic: Nonfocal.     Diagnostic Data (reviewed with patient):  Lab " Results   Component Value Date    GLUCOSE 131 (H) 06/15/2020    BUN 25 (H) 06/15/2020    CREATININE 1.01 06/15/2020    EGFRIFNONA 74 06/15/2020    BCR 24.8 06/15/2020     06/15/2020    K 4.5 06/15/2020     06/15/2020    CO2 25.4 06/15/2020    CALCIUM 9.0 06/15/2020    ALBUMIN 4.10 06/15/2020    ALKPHOS 62 06/15/2020    AST 19 06/15/2020    ALT 20 06/15/2020     Lab Results   Component Value Date    CHOL 125 06/15/2020    TRIG 56 06/15/2020    HDL 61 (H) 06/15/2020    LDL 53 06/15/2020      Lab Results   Component Value Date    WBC 4.20 12/20/2019    RBC 5.24 12/20/2019    HGB 15.6 12/20/2019    HCT 47.8 12/20/2019    MCV 91.2 12/20/2019     12/20/2019      Lab Results   Component Value Date    TSH 2.140 12/20/2019        Procedures      Assessment:    ICD-10-CM ICD-9-CM   1. Essential hypertension  I10 401.9   2. Mixed hyperlipidemia  E78.2 272.2         Plan:  1. Continue routine aerobic exercise for at least 30 minutes 5 days per week.  2. Continue atorvastatin 20mg for hyperlipidemia.   3. Continue all other current medications.  4. F/up in 24 months, sooner if needed.    Scribed for Lauren Ramos MD by Mikel Lu. 10/21/2020  11:55 EDT     I Lauren Ramos MD personally performed the services described in this documentation as scribed by the above individual in my presence, and it is both accurate and complete.    Lauren Ramos MD, FACC

## 2020-10-28 ENCOUNTER — OFFICE VISIT (OUTPATIENT)
Dept: ENDOCRINOLOGY | Facility: CLINIC | Age: 70
End: 2020-10-28

## 2020-10-28 VITALS
BODY MASS INDEX: 23.43 KG/M2 | DIASTOLIC BLOOD PRESSURE: 78 MMHG | SYSTOLIC BLOOD PRESSURE: 128 MMHG | WEIGHT: 173 LBS | OXYGEN SATURATION: 98 % | HEART RATE: 71 BPM | HEIGHT: 72 IN

## 2020-10-28 DIAGNOSIS — E11.9 TYPE 2 DIABETES MELLITUS WITHOUT COMPLICATION, WITHOUT LONG-TERM CURRENT USE OF INSULIN (HCC): Primary | ICD-10-CM

## 2020-10-28 PROCEDURE — 99213 OFFICE O/P EST LOW 20 MIN: CPT | Performed by: INTERNAL MEDICINE

## 2020-10-28 PROCEDURE — 83036 HEMOGLOBIN GLYCOSYLATED A1C: CPT | Performed by: INTERNAL MEDICINE

## 2020-10-28 NOTE — PROGRESS NOTES
"     Office Note      Date: 10/28/2020  Patient Name: Francisco Gonzalez  MRN: [id number]  : 1950    Chief Complaint   Patient presents with   • Diabetes     follow up       History of Present Illness:   Francisco Gonzalez is a 69 y.o. male who presents for Diabetes type 2. Diagnosed in: . Treated in past with oral agents. Current treatments: metformin and jardiance. Number of insulin shots per day: none. Checks blood sugar 1 times a day. Has low blood sugar: no. Aspirin use: Yes. Statin use: Yes. ACE-I/ARB use: No - no indication. Changes in health since last visit: none. Last eye exam .    Subjective      Diabetic Complications:  Eyes: No  Kidneys: No  Feet: No  Heart: No    Diet and Exercise:  Meals per day: 3  Minutes of exercise per week: 300 mins.    Review of Systems:   Review of Systems   Constitutional: Negative.    Cardiovascular: Negative.    Gastrointestinal: Negative.    Endocrine: Negative.        The following portions of the patient's history were reviewed and updated as appropriate: allergies, current medications, past family history, past medical history, past social history, past surgical history and problem list.    Objective       Visit Vitals  /78   Pulse 71   Ht 182.9 cm (72\")   Wt 78.5 kg (173 lb)   SpO2 98%   BMI 23.46 kg/m²       Physical Exam:  Physical Exam  Constitutional:       Appearance: Normal appearance.   Neurological:      Mental Status: He is alert.         Labs:    HbA1c  Lab Results   Component Value Date    HGBA1C 6.40 (H) 2020       CMP  Lab Results   Component Value Date    GLUCOSE 131 (H) 06/15/2020    BUN 25 (H) 06/15/2020    CREATININE 1.01 06/15/2020    EGFRIFNONA 74 06/15/2020    BCR 24.8 06/15/2020    K 4.5 06/15/2020    CO2 25.4 06/15/2020    CALCIUM 9.0 06/15/2020    AST 19 06/15/2020    ALT 20 06/15/2020        Lipid Panel  Lab Results   Component Value Date    HDL 61 (H) 06/15/2020    LDL 53 06/15/2020    TRIG 56 06/15/2020    "     TSH  Lab Results   Component Value Date    TSH 2.140 12/20/2019        Hemoglobin A1C  Lab Results   Component Value Date    HGBA1C 6.40 (H) 05/13/2020        Microalbumin/Creatinine  No results found for: MALBCRERATIO, CREATINIURIN, MICROALBUR        Assessment / Plan      Assessment & Plan:  Problem List Items Addressed This Visit        Endocrine    Diabetes mellitus, type II (CMS/[facility]) - Primary    Current Assessment & Plan     Recent FSBS okay.  Check A1c today.         Relevant Medications    JARDIANCE 25 MG tablet    metFORMIN (GLUCOPHAGE) 1000 MG tablet    Other Relevant Orders    Hemoglobin A1c           Return in about 3 months (around 1/28/2021) for Recheck with A1c.    Jose Allen MD   10/28/2020

## 2020-10-29 LAB — HBA1C MFR BLD: 6.61 % (ref 4.8–5.6)

## 2020-12-29 DIAGNOSIS — E11.9 TYPE 2 DIABETES MELLITUS WITHOUT COMPLICATION, WITHOUT LONG-TERM CURRENT USE OF INSULIN (HCC): ICD-10-CM

## 2020-12-29 DIAGNOSIS — E78.2 MIXED HYPERLIPIDEMIA: ICD-10-CM

## 2020-12-29 DIAGNOSIS — I10 ESSENTIAL HYPERTENSION: Primary | ICD-10-CM

## 2020-12-30 DIAGNOSIS — Z12.5 PROSTATE CANCER SCREENING: Primary | ICD-10-CM

## 2020-12-31 ENCOUNTER — LAB (OUTPATIENT)
Dept: LAB | Facility: HOSPITAL | Age: 70
End: 2020-12-31

## 2020-12-31 DIAGNOSIS — E11.9 TYPE 2 DIABETES MELLITUS WITHOUT COMPLICATION, WITHOUT LONG-TERM CURRENT USE OF INSULIN (HCC): ICD-10-CM

## 2020-12-31 DIAGNOSIS — I10 ESSENTIAL HYPERTENSION: ICD-10-CM

## 2020-12-31 DIAGNOSIS — E78.2 MIXED HYPERLIPIDEMIA: ICD-10-CM

## 2020-12-31 DIAGNOSIS — Z12.5 PROSTATE CANCER SCREENING: ICD-10-CM

## 2020-12-31 LAB
ALBUMIN SERPL-MCNC: 4.1 G/DL (ref 3.5–5.2)
ALBUMIN UR-MCNC: <1.2 MG/DL
ALBUMIN/GLOB SERPL: 2 G/DL
ALP SERPL-CCNC: 75 U/L (ref 39–117)
ALT SERPL W P-5'-P-CCNC: 24 U/L (ref 1–41)
ANION GAP SERPL CALCULATED.3IONS-SCNC: 8.9 MMOL/L (ref 5–15)
AST SERPL-CCNC: 21 U/L (ref 1–40)
BASOPHILS # BLD AUTO: 0.06 10*3/MM3 (ref 0–0.2)
BASOPHILS NFR BLD AUTO: 1.1 % (ref 0–1.5)
BILIRUB SERPL-MCNC: 0.4 MG/DL (ref 0–1.2)
BUN SERPL-MCNC: 22 MG/DL (ref 8–23)
BUN/CREAT SERPL: 27.2 (ref 7–25)
CALCIUM SPEC-SCNC: 9.1 MG/DL (ref 8.6–10.5)
CHLORIDE SERPL-SCNC: 106 MMOL/L (ref 98–107)
CHOLEST SERPL-MCNC: 158 MG/DL (ref 0–200)
CO2 SERPL-SCNC: 26.1 MMOL/L (ref 22–29)
CREAT SERPL-MCNC: 0.81 MG/DL (ref 0.76–1.27)
CREAT UR-MCNC: 56.9 MG/DL
DEPRECATED RDW RBC AUTO: 42.2 FL (ref 37–54)
EOSINOPHIL # BLD AUTO: 0.66 10*3/MM3 (ref 0–0.4)
EOSINOPHIL NFR BLD AUTO: 12.5 % (ref 0.3–6.2)
ERYTHROCYTE [DISTWIDTH] IN BLOOD BY AUTOMATED COUNT: 12.2 % (ref 12.3–15.4)
GFR SERPL CREATININE-BSD FRML MDRD: 95 ML/MIN/1.73
GLOBULIN UR ELPH-MCNC: 2.1 GM/DL
GLUCOSE SERPL-MCNC: 139 MG/DL (ref 65–99)
HCT VFR BLD AUTO: 48.4 % (ref 37.5–51)
HDLC SERPL-MCNC: 71 MG/DL (ref 40–60)
HGB BLD-MCNC: 16.3 G/DL (ref 13–17.7)
IMM GRANULOCYTES # BLD AUTO: 0.02 10*3/MM3 (ref 0–0.05)
IMM GRANULOCYTES NFR BLD AUTO: 0.4 % (ref 0–0.5)
LDLC SERPL CALC-MCNC: 76 MG/DL (ref 0–100)
LDLC/HDLC SERPL: 1.08 {RATIO}
LYMPHOCYTES # BLD AUTO: 1.28 10*3/MM3 (ref 0.7–3.1)
LYMPHOCYTES NFR BLD AUTO: 24.2 % (ref 19.6–45.3)
MCH RBC QN AUTO: 31.3 PG (ref 26.6–33)
MCHC RBC AUTO-ENTMCNC: 33.7 G/DL (ref 31.5–35.7)
MCV RBC AUTO: 93.1 FL (ref 79–97)
MICROALBUMIN/CREAT UR: NORMAL MG/G{CREAT}
MONOCYTES # BLD AUTO: 0.5 10*3/MM3 (ref 0.1–0.9)
MONOCYTES NFR BLD AUTO: 9.5 % (ref 5–12)
NEUTROPHILS NFR BLD AUTO: 2.77 10*3/MM3 (ref 1.7–7)
NEUTROPHILS NFR BLD AUTO: 52.3 % (ref 42.7–76)
NRBC BLD AUTO-RTO: 0 /100 WBC (ref 0–0.2)
PLATELET # BLD AUTO: 198 10*3/MM3 (ref 140–450)
PMV BLD AUTO: 10.7 FL (ref 6–12)
POTASSIUM SERPL-SCNC: 4.6 MMOL/L (ref 3.5–5.2)
PROT SERPL-MCNC: 6.2 G/DL (ref 6–8.5)
PSA SERPL-MCNC: 0.87 NG/ML (ref 0–4)
RBC # BLD AUTO: 5.2 10*6/MM3 (ref 4.14–5.8)
SODIUM SERPL-SCNC: 141 MMOL/L (ref 136–145)
TRIGL SERPL-MCNC: 50 MG/DL (ref 0–150)
VLDLC SERPL-MCNC: 11 MG/DL (ref 5–40)
WBC # BLD AUTO: 5.29 10*3/MM3 (ref 3.4–10.8)

## 2020-12-31 PROCEDURE — 80061 LIPID PANEL: CPT

## 2020-12-31 PROCEDURE — 85025 COMPLETE CBC W/AUTO DIFF WBC: CPT

## 2020-12-31 PROCEDURE — 82570 ASSAY OF URINE CREATININE: CPT

## 2020-12-31 PROCEDURE — 82043 UR ALBUMIN QUANTITATIVE: CPT

## 2020-12-31 PROCEDURE — G0103 PSA SCREENING: HCPCS

## 2020-12-31 PROCEDURE — 80053 COMPREHEN METABOLIC PANEL: CPT

## 2021-01-05 ENCOUNTER — OFFICE VISIT (OUTPATIENT)
Dept: INTERNAL MEDICINE | Facility: CLINIC | Age: 71
End: 2021-01-05

## 2021-01-05 VITALS
WEIGHT: 178 LBS | HEART RATE: 80 BPM | TEMPERATURE: 97.1 F | HEIGHT: 72 IN | SYSTOLIC BLOOD PRESSURE: 134 MMHG | DIASTOLIC BLOOD PRESSURE: 82 MMHG | BODY MASS INDEX: 24.11 KG/M2

## 2021-01-05 DIAGNOSIS — E11.9 TYPE 2 DIABETES MELLITUS WITHOUT COMPLICATION, WITHOUT LONG-TERM CURRENT USE OF INSULIN (HCC): ICD-10-CM

## 2021-01-05 DIAGNOSIS — E78.2 MIXED HYPERLIPIDEMIA: ICD-10-CM

## 2021-01-05 DIAGNOSIS — H61.23 IMPACTED CERUMEN, BILATERAL: ICD-10-CM

## 2021-01-05 DIAGNOSIS — F41.1 ANXIETY STATE: ICD-10-CM

## 2021-01-05 DIAGNOSIS — Z00.00 ANNUAL PHYSICAL EXAM: Primary | ICD-10-CM

## 2021-01-05 DIAGNOSIS — I10 ESSENTIAL HYPERTENSION: ICD-10-CM

## 2021-01-05 PROCEDURE — 69209 REMOVE IMPACTED EAR WAX UNI: CPT | Performed by: INTERNAL MEDICINE

## 2021-01-05 PROCEDURE — G0439 PPPS, SUBSEQ VISIT: HCPCS | Performed by: INTERNAL MEDICINE

## 2021-01-05 RX ORDER — TADALAFIL 10 MG/1
10 TABLET ORAL DAILY PRN
Qty: 30 TABLET | Refills: 3 | Status: SHIPPED | OUTPATIENT
Start: 2021-01-05 | End: 2022-07-14 | Stop reason: SDUPTHER

## 2021-01-05 RX ORDER — HYDROCORTISONE 25 MG/G
CREAM TOPICAL 2 TIMES DAILY
Qty: 30 G | Refills: 2 | Status: SHIPPED | OUTPATIENT
Start: 2021-01-05 | End: 2022-12-15 | Stop reason: SDUPTHER

## 2021-01-05 NOTE — PROGRESS NOTES
Ear Cerumen Removal    Date/Time: 1/5/2021 11:59 AM  Performed by: Newton Cruz MD  Authorized by: Newton Cruz MD     Anesthesia:  Local Anesthetic: none  Location details: left ear and right ear  Patient tolerance: patient tolerated the procedure well with no immediate complications  Procedure type: irrigation   Sedation:  Patient sedated: no

## 2021-01-05 NOTE — PROGRESS NOTES
QUICK REFERENCE INFORMATION:  The ABCs of the Annual Wellness Visit    Subsequent Medicare Wellness Visit    HEALTH RISK ASSESSMENT    1950    Recent Hospitalizations:  No hospitalization(s) within the last year..        Current Medical Providers:  Patient Care Team:  Newton Cruz MD as PCP - General (Internal Medicine)  Gisselle Izaguirre PA-C as Physician Assistant (Internal Medicine)        Smoking Status:  Social History     Tobacco Use   Smoking Status Never Smoker   Smokeless Tobacco Never Used       Alcohol Consumption:  Social History     Substance and Sexual Activity   Alcohol Use Yes   • Types: 1 Cans of beer per week       Depression Screen:   PHQ-2/PHQ-9 Depression Screening 1/5/2021   Little interest or pleasure in doing things 0   Feeling down, depressed, or hopeless 0   Total Score 0       Health Habits and Functional and Cognitive Screening:  Functional & Cognitive Status 1/5/2021   Do you have difficulty preparing food and eating? No   Do you have difficulty bathing yourself, getting dressed or grooming yourself? No   Do you have difficulty using the toilet? No   Do you have difficulty moving around from place to place? No   Do you have trouble with steps or getting out of a bed or a chair? No   Current Diet Well Balanced Diet   Dental Exam Up to date   Eye Exam Up to date   Exercise (times per week) 5 times per week   Current Exercise Activities Include Cardiovasular Workout on Exercise Equipment   Do you need help using the phone?  No   Are you deaf or do you have serious difficulty hearing?  No   Do you need help with transportation? No   Do you need help shopping? No   Do you need help preparing meals?  No   Do you need help with housework?  No   Do you need help with laundry? No   Do you need help taking your medications? No   Do you need help managing money? No   Do you ever drive or ride in a car without wearing a seat belt? No   Have you felt unusual stress, anger or loneliness  in the last month? No   Who do you live with? Spouse   If you need help, do you have trouble finding someone available to you? No   Have you been bothered in the last four weeks by sexual problems? No   Do you have difficulty concentrating, remembering or making decisions? No       Fall Risk Screen:  HENNA Fall Risk Assessment was completed, and patient is at LOW risk for falls.Assessment completed on:1/5/2021    ACE III MINI        Does the patient have evidence of cognitive impairment? No    Aspirin use counseling: Taking ASA appropriately as indicated    Recent Lab Results:  CMP:  Lab Results   Component Value Date    BUN 22 12/31/2020    CREATININE 0.81 12/31/2020    EGFRIFNONA 95 12/31/2020    BCR 27.2 (H) 12/31/2020     12/31/2020    K 4.6 12/31/2020    CO2 26.1 12/31/2020    CALCIUM 9.1 12/31/2020    ALBUMIN 4.10 12/31/2020    BILITOT 0.4 12/31/2020    ALKPHOS 75 12/31/2020    AST 21 12/31/2020    ALT 24 12/31/2020     HbA1c:  Lab Results   Component Value Date    HGBA1C 6.61 (H) 10/28/2020    HGBA1C 6.40 (H) 05/13/2020     Microalbumin:  Lab Results   Component Value Date    MICROALBUR <1.2 12/31/2020    POCMALB 10 mg/L 12/17/2019    POCCREAT 100 mg/dL 12/17/2019     Lipid Panel  Lab Results   Component Value Date    CHOL 158 12/31/2020    TRIG 50 12/31/2020    HDL 71 (H) 12/31/2020    LDL 76 12/31/2020    AST 21 12/31/2020    ALT 24 12/31/2020       Visual Acuity:  No exam data present    Age-appropriate Screening Schedule:  Refer to the list below for future screening recommendations based on patient's age, sex and/or medical conditions. Orders for these recommended tests are listed in the plan section. The patient has been provided with a written plan.    Health Maintenance   Topic Date Due   • DIABETIC EYE EXAM  10/01/2019   • INFLUENZA VACCINE  08/01/2020   • DIABETIC FOOT EXAM  12/17/2020   • HEMOGLOBIN A1C  04/28/2021   • COLONOSCOPY  11/14/2021   • LIPID PANEL  12/31/2021   • URINE  MICROALBUMIN  12/31/2021   • TDAP/TD VACCINES (3 - Td) 05/08/2024   • ZOSTER VACCINE  Completed        Subjective   History of Present Illness    Francisco Gonzalez is a 70 y.o. male who presents for a Subsequent Wellness Visit.    CHRONIC CONDITIONS    The following portions of the patient's history were reviewed and updated as appropriate: allergies, current medications, past family history, past medical history, past social history, past surgical history and problem list.    Outpatient Medications Prior to Visit   Medication Sig Dispense Refill   • ACCU-CHEK FASTCLIX LANCETS misc TEST BLOOD GLUCOSE BID  6   • ACCU-CHEK GUIDE test strip CHECK BLOOD GLUCOSE 2 TIMES A DAY  3   • alfuzosin (UROXATRAL) 10 MG 24 hr tablet Take 10 mg by mouth Daily.     • ALPRAZolam (XANAX) 0.5 MG tablet Take 1 tablet by mouth At Night As Needed for Anxiety or Sleep. 30 tablet 0   • APPLE CIDER VINEGAR PO Take 480 mg by mouth Daily.     • aspirin 81 MG tablet Take 81 mg by mouth Daily.     • atorvastatin (LIPITOR) 20 MG tablet Take 1 tablet by mouth Daily. 90 tablet 1   • Cream Base cream Hydrocortisone 2.5%-nitroglycerin 0.2% ointment: Apply a small amount to the appropriate area twice a day as needed 30 g 1   • DiphenhydrAMINE HCl, Sleep, (ZZZQUIL PO) Take As Directed.     • escitalopram (LEXAPRO) 10 MG tablet Take 1 tablet by mouth Daily. (Patient taking differently: Take 5 mg by mouth Daily.) 30 tablet 5   • fluticasone (Flonase) 50 MCG/ACT nasal spray 2 sprays into the nostril(s) as directed by provider Daily. 1 bottle 5   • Hydrocortisone Acetate (PROCTOSOL RE) Insert 1 application into the rectum As Needed.     • JARDIANCE 25 MG tablet Take 1 tablet by mouth Daily.     • metFORMIN (GLUCOPHAGE) 1000 MG tablet take 1 tablet by mouth twice a day with food 180 tablet 1   • Multiple Vitamins-Minerals (MULTIVITAMIN ADULTS 50+ PO) Take 1 tablet by mouth Daily.     • tadalafil (CIALIS) 5 MG tablet Take 1 tablet by mouth As Needed for  erectile dysfunction. 30 tablet 5     No facility-administered medications prior to visit.        Patient Active Problem List   Diagnosis   • Exertional chest pain   • Essential hypertension   • Mixed hyperlipidemia   • Asthma   • Sensorineural hearing loss (SNHL) of both ears   • Allergic rhinitis   • Acute prostatitis   • Diabetes mellitus, type II (CMS/HCC)   • Hypertrophy of prostate without urinary obstruction   • Anxiety state   • Annual physical exam   • Atypical chest pain   • Impacted cerumen of right ear   • Impacted cerumen of left ear       Advance Care Planning:  ACP discussion was held with the patient during this visit. Patient has an advance directive in EMR which is still valid.     Identification of Risk Factors:  Risk factors include: Advance Directive Discussion.    Review of Systems   Constitutional: Negative for chills, fatigue and fever.   HENT: Negative for congestion, ear pain and sinus pressure.    Respiratory: Negative for cough, chest tightness, shortness of breath and wheezing.    Cardiovascular: Negative for chest pain and palpitations.   Gastrointestinal: Negative for abdominal pain, blood in stool and constipation.   Skin: Negative for color change.   Allergic/Immunologic: Negative for environmental allergies.   Neurological: Negative for dizziness, speech difficulty and headaches.   Psychiatric/Behavioral: Negative for confusion. The patient is not nervous/anxious.        Compared to one year ago, the patient feels his physical health is the same.  Compared to one year ago, the patient feels his mental health is the same.    Objective     Physical Exam  Vitals signs and nursing note reviewed.   Constitutional:       Appearance: He is well-developed.   HENT:      Head: Normocephalic and atraumatic.      Right Ear: There is impacted cerumen.      Left Ear: There is impacted cerumen.      Nose: Nose normal.      Mouth/Throat:      Pharynx: No oropharyngeal exudate.   Eyes:       Conjunctiva/sclera: Conjunctivae normal.      Pupils: Pupils are equal, round, and reactive to light.   Neck:      Musculoskeletal: Normal range of motion and neck supple.      Thyroid: No thyromegaly.      Vascular: No JVD.   Cardiovascular:      Rate and Rhythm: Normal rate and regular rhythm.      Pulses:           Dorsalis pedis pulses are 1+ on the right side and 1+ on the left side.      Heart sounds: Normal heart sounds. No murmur. No friction rub. No gallop.    Pulmonary:      Effort: Pulmonary effort is normal. No respiratory distress.      Breath sounds: Normal breath sounds. No wheezing or rales.   Chest:      Chest wall: No tenderness.   Abdominal:      General: Bowel sounds are normal. There is no distension.      Palpations: Abdomen is soft. There is no mass.      Tenderness: There is no abdominal tenderness. There is no guarding or rebound.      Hernia: No hernia is present.   Musculoskeletal: Normal range of motion.         General: No tenderness.      Right foot: No deformity.      Left foot: No deformity.   Feet:      Right foot:      Protective Sensation: 5 sites tested. 5 sites sensed.      Skin integrity: Skin integrity normal.      Left foot:      Protective Sensation: 5 sites tested. 5 sites sensed.      Skin integrity: Skin integrity normal.      Comments: Sensation in both feet intact to monofilament.     Diabetic Foot Exam Performed and Monofilament Test Performed    Lymphadenopathy:      Cervical: No cervical adenopathy.   Skin:     General: Skin is warm and dry.      Findings: No erythema or rash.   Neurological:      Mental Status: He is alert and oriented to person, place, and time.      Cranial Nerves: No cranial nerve deficit.      Sensory: No sensory deficit.      Motor: No abnormal muscle tone.      Coordination: Coordination normal.      Deep Tendon Reflexes: Reflexes normal.   Psychiatric:         Behavior: Behavior normal.         Thought Content: Thought content normal.          "Judgment: Judgment normal.          Procedures     Vitals:    01/05/21 0927   BP: 134/82   BP Location: Left arm   Patient Position: Sitting   Cuff Size: Adult   Pulse: 80   Temp: 97.1 °F (36.2 °C)   Weight: 80.7 kg (178 lb)   Height: 182.9 cm (72.01\")   PainSc: 0-No pain       Patient's Body mass index is 24.14 kg/m². BMI is within normal parameters. No follow-up required..      Assessment/Plan   Problem List Items Addressed This Visit        Cardiac and Vasculature    Essential hypertension    Mixed hyperlipidemia    Relevant Medications    atorvastatin (LIPITOR) 20 MG tablet       Endocrine and Metabolic    Diabetes mellitus, type II (CMS/HCC)    Relevant Medications    JARDIANCE 25 MG tablet    metFORMIN (GLUCOPHAGE) 1000 MG tablet       Health Encounters    Annual physical exam - Primary       Mental Health    Anxiety state    Relevant Medications    ALPRAZolam (XANAX) 0.5 MG tablet      Other Visit Diagnoses     Impacted cerumen, bilateral        Relevant Orders    Ear Cerumen Removal        Patient Self-Management and Personalized Health Advice  The patient has been provided with information about: diet, exercise and prevention of cardiac or vascular disease and preventive services including:   · Annual Wellness Visit (AWV).    Outpatient Encounter Medications as of 1/5/2021   Medication Sig Dispense Refill   • ACCU-CHEK FASTCLIX LANCETS misc TEST BLOOD GLUCOSE BID  6   • ACCU-CHEK GUIDE test strip CHECK BLOOD GLUCOSE 2 TIMES A DAY  3   • alfuzosin (UROXATRAL) 10 MG 24 hr tablet Take 10 mg by mouth Daily.     • ALPRAZolam (XANAX) 0.5 MG tablet Take 1 tablet by mouth At Night As Needed for Anxiety or Sleep. 30 tablet 0   • APPLE CIDER VINEGAR PO Take 480 mg by mouth Daily.     • aspirin 81 MG tablet Take 81 mg by mouth Daily.     • atorvastatin (LIPITOR) 20 MG tablet Take 1 tablet by mouth Daily. 90 tablet 1   • Cream Base cream Hydrocortisone 2.5%-nitroglycerin 0.2% ointment: Apply a small amount to the " appropriate area twice a day as needed 30 g 1   • DiphenhydrAMINE HCl, Sleep, (ZZZQUIL PO) Take As Directed.     • escitalopram (LEXAPRO) 10 MG tablet Take 1 tablet by mouth Daily. (Patient taking differently: Take 5 mg by mouth Daily.) 30 tablet 5   • fluticasone (Flonase) 50 MCG/ACT nasal spray 2 sprays into the nostril(s) as directed by provider Daily. 1 bottle 5   • Hydrocortisone Acetate (PROCTOSOL RE) Insert 1 application into the rectum As Needed.     • JARDIANCE 25 MG tablet Take 1 tablet by mouth Daily.     • metFORMIN (GLUCOPHAGE) 1000 MG tablet take 1 tablet by mouth twice a day with food 180 tablet 1   • Multiple Vitamins-Minerals (MULTIVITAMIN ADULTS 50+ PO) Take 1 tablet by mouth Daily.     • [DISCONTINUED] tadalafil (CIALIS) 5 MG tablet Take 1 tablet by mouth As Needed for erectile dysfunction. 30 tablet 5   • Hydrocortisone, Perianal, (ANUSOL-HC) 2.5 % rectal cream Insert  into the rectum 2 (Two) Times a Day. 30 g 2   • tadalafil (Cialis) 10 MG tablet Take 1 tablet by mouth Daily As Needed for Erectile Dysfunction. 30 tablet 3     No facility-administered encounter medications on file as of 1/5/2021.      Plan    Overall he remains in excellent health with good lifestyle habits.  He is up-to-date on flu vaccine and is due for colonoscopy later this year.    Blood pressure is well controlled on nonpharmacologic therapy since his weight loss.    Lipids are very well controlled with atorvastatin and healthy diet.    He will continue to follow-up with endocrinology for his diabetes.  Foot exam was performed today.  His renal function has actually improved.    Anxiety is well controlled on Lexapro.    Reviewed use of high risk medication in the elderly: yes  Reviewed for potential of harmful drug interactions in the elderly: yes    Follow Up:  No follow-ups on file.     There are no Patient Instructions on file for this visit.    An After Visit Summary and PPPS with all of these plans were given to the  patient.

## 2021-01-14 ENCOUNTER — IMMUNIZATION (OUTPATIENT)
Dept: VACCINE CLINIC | Facility: HOSPITAL | Age: 71
End: 2021-01-14

## 2021-01-14 PROCEDURE — 0001A: CPT | Performed by: INTERNAL MEDICINE

## 2021-01-14 PROCEDURE — 91300 HC SARSCOV02 VAC 30MCG/0.3ML IM: CPT | Performed by: INTERNAL MEDICINE

## 2021-02-02 ENCOUNTER — LAB (OUTPATIENT)
Dept: LAB | Facility: HOSPITAL | Age: 71
End: 2021-02-02

## 2021-02-02 ENCOUNTER — OFFICE VISIT (OUTPATIENT)
Dept: ENDOCRINOLOGY | Facility: CLINIC | Age: 71
End: 2021-02-02

## 2021-02-02 VITALS
TEMPERATURE: 97.1 F | SYSTOLIC BLOOD PRESSURE: 120 MMHG | WEIGHT: 182 LBS | HEIGHT: 72 IN | HEART RATE: 80 BPM | BODY MASS INDEX: 24.65 KG/M2 | DIASTOLIC BLOOD PRESSURE: 58 MMHG | OXYGEN SATURATION: 97 %

## 2021-02-02 DIAGNOSIS — E11.9 TYPE 2 DIABETES MELLITUS WITHOUT COMPLICATION, WITHOUT LONG-TERM CURRENT USE OF INSULIN (HCC): ICD-10-CM

## 2021-02-02 DIAGNOSIS — I10 ESSENTIAL HYPERTENSION: ICD-10-CM

## 2021-02-02 DIAGNOSIS — E11.9 TYPE 2 DIABETES MELLITUS WITHOUT COMPLICATION, WITHOUT LONG-TERM CURRENT USE OF INSULIN (HCC): Primary | ICD-10-CM

## 2021-02-02 DIAGNOSIS — E78.2 MIXED HYPERLIPIDEMIA: ICD-10-CM

## 2021-02-02 LAB
EXPIRATION DATE: NORMAL
HBA1C MFR BLD: 6.9 %
Lab: NORMAL

## 2021-02-02 PROCEDURE — 83036 HEMOGLOBIN GLYCOSYLATED A1C: CPT | Performed by: INTERNAL MEDICINE

## 2021-02-02 PROCEDURE — 84443 ASSAY THYROID STIM HORMONE: CPT

## 2021-02-02 PROCEDURE — 99214 OFFICE O/P EST MOD 30 MIN: CPT | Performed by: INTERNAL MEDICINE

## 2021-02-02 NOTE — PROGRESS NOTES
"     Office Note      Date: 2021  Patient Name: Francisco Gonzalez  MRN: [id number]  : 1950    Chief Complaint   Patient presents with   • Diabetes       History of Present Illness:   Francisco Gonzalez is a 70 y.o. male who presents for Diabetes type 2. Diagnosed in: . Treated in past with oral agents. Current treatments: metformin and jardiance. Number of insulin shots per day: none. Checks blood sugar 1 times a day. Has low blood sugar: no. Aspirin use: Yes. Statin use: Yes. ACE-I/ARB use: No - no indication. Changes in health since last visit: none. Last eye exam .    Subjective      Diabetic Complications:  Eyes: No  Kidneys: No  Feet: No  Heart: No    Diet and Exercise:  Meals per day: 3  Minutes of exercise per week: 300 mins.    Review of Systems:   Review of Systems   Constitutional: Negative.    Cardiovascular: Negative.    Gastrointestinal: Negative.    Endocrine: Negative.        The following portions of the patient's history were reviewed and updated as appropriate: allergies, current medications, past family history, past medical history, past social history, past surgical history and problem list.    Objective       Visit Vitals  /58 (BP Location: Left arm, Patient Position: Sitting, Cuff Size: Adult)   Pulse 80   Temp 97.1 °F (36.2 °C) (Infrared)   Ht 182.9 cm (72\")   Wt 82.6 kg (182 lb)   SpO2 97%   BMI 24.68 kg/m²       Physical Exam:  Physical Exam  Constitutional:       Appearance: Normal appearance.   Cardiovascular:      Pulses:           Dorsalis pedis pulses are 2+ on the right side and 2+ on the left side.        Posterior tibial pulses are 2+ on the right side and 2+ on the left side.   Feet:      Right foot:      Protective Sensation: 5 sites tested. 5 sites sensed.      Skin integrity: Skin integrity normal.      Toenail Condition: Right toenails are abnormally thick.      Left foot:      Protective Sensation: 5 sites tested. 5 sites sensed.      Skin " integrity: Skin integrity normal.      Toenail Condition: Left toenails are abnormally thick.   Neurological:      Mental Status: He is alert.         Labs:    HbA1c  Lab Results   Component Value Date    HGBA1C 6.9 02/02/2021       CMP  Lab Results   Component Value Date    GLUCOSE 139 (H) 12/31/2020    BUN 22 12/31/2020    CREATININE 0.81 12/31/2020    EGFRIFNONA 95 12/31/2020    BCR 27.2 (H) 12/31/2020    K 4.6 12/31/2020    CO2 26.1 12/31/2020    CALCIUM 9.1 12/31/2020    AST 21 12/31/2020    ALT 24 12/31/2020        Lipid Panel  Lab Results   Component Value Date    HDL 71 (H) 12/31/2020    LDL 76 12/31/2020    TRIG 50 12/31/2020        TSH  Lab Results   Component Value Date    TSH 2.140 12/20/2019        Hemoglobin A1C  Lab Results   Component Value Date    HGBA1C 6.9 02/02/2021        Microalbumin/Creatinine  Lab Results   Component Value Date    MALBCRERATIO  12/31/2020      Comment:      Unable to calculate    MICROALBUR <1.2 12/31/2020           Assessment / Plan      Assessment & Plan:  Diagnoses and all orders for this visit:    1. Type 2 diabetes mellitus without complication, without long-term current use of insulin (CMS/[facility]) (Primary)  Assessment & Plan:  Diabetes is unchanged.   Continue current treatment regimen.  Diabetes will be reassessed in 3 months.    Orders:  -     POC Glycosylated Hemoglobin (Hb A1C)  -     TSH; Future    2. Essential hypertension  Assessment & Plan:  Hypertension is unchanged.  Continue current treatment regimen.  Blood pressure will be reassessed in 3 months.      3. Mixed hyperlipidemia  Assessment & Plan:  Continue statin.  Recent lipids at goal.        Return in about 3 months (around 5/2/2021) for Recheck with A1c.    Jose Allen MD   02/02/2021

## 2021-02-03 LAB — TSH SERPL DL<=0.05 MIU/L-ACNC: 1.42 UIU/ML (ref 0.27–4.2)

## 2021-02-04 ENCOUNTER — IMMUNIZATION (OUTPATIENT)
Dept: VACCINE CLINIC | Facility: HOSPITAL | Age: 71
End: 2021-02-04

## 2021-02-04 PROCEDURE — 91300 HC SARSCOV02 VAC 30MCG/0.3ML IM: CPT | Performed by: INTERNAL MEDICINE

## 2021-02-04 PROCEDURE — 0002A: CPT | Performed by: INTERNAL MEDICINE

## 2021-02-15 RX ORDER — ATORVASTATIN CALCIUM 20 MG/1
20 TABLET, FILM COATED ORAL DAILY
Qty: 90 TABLET | Refills: 1 | Status: SHIPPED | OUTPATIENT
Start: 2021-02-15 | End: 2021-06-28 | Stop reason: SDUPTHER

## 2021-03-02 ENCOUNTER — OFFICE VISIT (OUTPATIENT)
Dept: INTERNAL MEDICINE | Facility: CLINIC | Age: 71
End: 2021-03-02

## 2021-03-02 VITALS
HEIGHT: 72 IN | TEMPERATURE: 98.2 F | WEIGHT: 185.2 LBS | DIASTOLIC BLOOD PRESSURE: 78 MMHG | HEART RATE: 76 BPM | BODY MASS INDEX: 25.09 KG/M2 | SYSTOLIC BLOOD PRESSURE: 132 MMHG | OXYGEN SATURATION: 98 %

## 2021-03-02 DIAGNOSIS — H61.23 BILATERAL IMPACTED CERUMEN: Primary | ICD-10-CM

## 2021-03-02 NOTE — PROGRESS NOTES
Patient Care Team:  Newton Cruz MD as PCP - General (Internal Medicine)  Gisselle Izaguirre PA-C as Physician Assistant (Internal Medicine)    Chief Complaint::   Chief Complaint   Patient presents with   • Cerumen Impaction        Subjective     HPI  Babak is a 62 year old male with history of recurrent bilateral cerumen impaction.  He reports decreased hearing in the left ear.  He routinely visits ENT for cerumen removal.  Has tried OTC debrox, which does help.   He denies ear pain or discharge.        The following portions of the patient's history were reviewed and updated as appropriate: active problem list, medication list, allergies, family history, social history    Review of Systems:   Review of Systems   Constitutional: Negative for activity change, appetite change, diaphoresis, fatigue, unexpected weight gain and unexpected weight loss.   HENT: Negative for ear discharge, ear pain and hearing loss.    Eyes: Negative for visual disturbance.   Respiratory: Negative for chest tightness and shortness of breath.    Cardiovascular: Negative for chest pain, palpitations and leg swelling.   Gastrointestinal: Negative for abdominal pain, blood in stool, GERD and indigestion.   Endocrine: Negative for cold intolerance and heat intolerance.   Genitourinary: Negative for dysuria and hematuria.   Musculoskeletal: Negative for arthralgias and myalgias.   Skin: Negative for skin lesions.   Neurological: Negative for tremors, seizures, syncope, speech difficulty, weakness, headache, memory problem and confusion.   Hematological: Does not bruise/bleed easily.   Psychiatric/Behavioral: Negative for sleep disturbance and depressed mood. The patient is not nervous/anxious.        Vital Signs  Vitals:    03/02/21 1528   BP: 132/78   BP Location: Right arm   Patient Position: Sitting   Cuff Size: Adult   Pulse: 76   Temp: 98.2 °F (36.8 °C)   TempSrc: Temporal   SpO2: 98%   Weight: 84 kg (185 lb 3.2 oz)   Height: 182.9  "cm (72.01\")   PainSc: 0-No pain     Body mass index is 25.11 kg/m².    Labs  Lab on 02/02/2021   Component Date Value Ref Range Status   • TSH 02/02/2021 1.420  0.270 - 4.200 uIU/mL Final   Office Visit on 02/02/2021   Component Date Value Ref Range Status   • Hemoglobin A1C 02/02/2021 6.9  % Final   • Lot Number 02/02/2021 [id number]   Final   • Expiration Date 02/02/2021 11/24/2022   Final   Lab on 12/31/2020   Component Date Value Ref Range Status   • Glucose 12/31/2020 139* 65 - 99 mg/dL Final   • BUN 12/31/2020 22  8 - 23 mg/dL Final   • Creatinine 12/31/2020 0.81  0.76 - 1.27 mg/dL Final   • Sodium 12/31/2020 141  136 - 145 mmol/L Final   • Potassium 12/31/2020 4.6  3.5 - 5.2 mmol/L Final   • Chloride 12/31/2020 106  98 - 107 mmol/L Final   • CO2 12/31/2020 26.1  22.0 - 29.0 mmol/L Final   • Calcium 12/31/2020 9.1  8.6 - 10.5 mg/dL Final   • Total Protein 12/31/2020 6.2  6.0 - 8.5 g/dL Final   • Albumin 12/31/2020 4.10  3.50 - 5.20 g/dL Final   • ALT (SGPT) 12/31/2020 24  1 - 41 U/L Final   • AST (SGOT) 12/31/2020 21  1 - 40 U/L Final   • Alkaline Phosphatase 12/31/2020 75  39 - 117 U/L Final   • Total Bilirubin 12/31/2020 0.4  0.0 - 1.2 mg/dL Final   • eGFR Non African Amer 12/31/2020 95  >60 mL/min/1.73 Final   • Globulin 12/31/2020 2.1  gm/dL Final   • A/G Ratio 12/31/2020 2.0  g/dL Final   • BUN/Creatinine Ratio 12/31/2020 27.2* 7.0 - 25.0 Final   • Anion Gap 12/31/2020 8.9  5.0 - 15.0 mmol/L Final   • Total Cholesterol 12/31/2020 158  0 - 200 mg/dL Final   • Triglycerides 12/31/2020 50  0 - 150 mg/dL Final   • HDL Cholesterol 12/31/2020 71* 40 - 60 mg/dL Final   • LDL Cholesterol  12/31/2020 76  0 - 100 mg/dL Final   • VLDL Cholesterol 12/31/2020 11  5 - 40 mg/dL Final   • LDL/HDL Ratio 12/31/2020 1.08   Final   • Microalbumin/Creatinine Ratio 12/31/2020    Final    Unable to calculate   • Creatinine, Urine 12/31/2020 56.9  mg/dL Final   • Microalbumin, Urine 12/31/2020 <1.2  mg/dL Final   • PSA " 12/31/2020 0.873  0.000 - 4.000 ng/mL Final   • WBC 12/31/2020 5.29  3.40 - 10.80 10*3/mm3 Final   • RBC 12/31/2020 5.20  4.14 - 5.80 10*6/mm3 Final   • Hemoglobin 12/31/2020 16.3  13.0 - 17.7 g/dL Final   • Hematocrit 12/31/2020 48.4  37.5 - 51.0 % Final   • MCV 12/31/2020 93.1  79.0 - 97.0 fL Final   • MCH 12/31/2020 31.3  26.6 - 33.0 pg Final   • MCHC 12/31/2020 33.7  31.5 - 35.7 g/dL Final   • RDW 12/31/2020 12.2* 12.3 - 15.4 % Final   • RDW-SD 12/31/2020 42.2  37.0 - 54.0 fl Final   • MPV 12/31/2020 10.7  6.0 - 12.0 fL Final   • Platelets 12/31/2020 198  140 - 450 10*3/mm3 Final   • Neutrophil % 12/31/2020 52.3  42.7 - 76.0 % Final   • Lymphocyte % 12/31/2020 24.2  19.6 - 45.3 % Final   • Monocyte % 12/31/2020 9.5  5.0 - 12.0 % Final   • Eosinophil % 12/31/2020 12.5* 0.3 - 6.2 % Final   • Basophil % 12/31/2020 1.1  0.0 - 1.5 % Final   • Immature Grans % 12/31/2020 0.4  0.0 - 0.5 % Final   • Neutrophils, Absolute 12/31/2020 2.77  1.70 - 7.00 10*3/mm3 Final   • Lymphocytes, Absolute 12/31/2020 1.28  0.70 - 3.10 10*3/mm3 Final   • Monocytes, Absolute 12/31/2020 0.50  0.10 - 0.90 10*3/mm3 Final   • Eosinophils, Absolute 12/31/2020 0.66* 0.00 - 0.40 10*3/mm3 Final   • Basophils, Absolute 12/31/2020 0.06  0.00 - 0.20 10*3/mm3 Final   • Immature Grans, Absolute 12/31/2020 0.02  0.00 - 0.05 10*3/mm3 Final   • nRBC 12/31/2020 0.0  0.0 - 0.2 /100 WBC Final       Imaging  No radiology results for the last 30 days.      Current Outpatient Medications:   •  ACCU-CHEK FASTCLIX LANCETS misc, TEST BLOOD GLUCOSE BID, Disp: , Rfl: 6  •  ACCU-CHEK GUIDE test strip, CHECK BLOOD GLUCOSE 2 TIMES A DAY, Disp: , Rfl: 3  •  alfuzosin (UROXATRAL) 10 MG 24 hr tablet, Take 10 mg by mouth Daily., Disp: , Rfl:   •  ALPRAZolam (XANAX) 0.5 MG tablet, Take 1 tablet by mouth At Night As Needed for Anxiety or Sleep., Disp: 30 tablet, Rfl: 0  •  APPLE CIDER VINEGAR PO, Take 480 mg by mouth Daily., Disp: , Rfl:   •  aspirin 81 MG tablet, Take 81  mg by mouth Daily., Disp: , Rfl:   •  atorvastatin (LIPITOR) 20 MG tablet, Take 1 tablet by mouth Daily., Disp: 90 tablet, Rfl: 1  •  Cream Base cream, Hydrocortisone 2.5%-nitroglycerin 0.2% ointment: Apply a small amount to the appropriate area twice a day as needed, Disp: 30 g, Rfl: 1  •  DiphenhydrAMINE HCl, Sleep, (ZZZQUIL PO), Take As Directed., Disp: , Rfl:   •  escitalopram (LEXAPRO) 10 MG tablet, Take 1 tablet by mouth Daily. (Patient taking differently: Take 5 mg by mouth Daily.), Disp: 30 tablet, Rfl: 5  •  fluticasone (Flonase) 50 MCG/ACT nasal spray, 2 sprays into the nostril(s) as directed by provider Daily., Disp: 1 bottle, Rfl: 5  •  Hydrocortisone Acetate (PROCTOSOL RE), Insert 1 application into the rectum As Needed., Disp: , Rfl:   •  Hydrocortisone, Perianal, (ANUSOL-HC) 2.5 % rectal cream, Insert  into the rectum 2 (Two) Times a Day., Disp: 30 g, Rfl: 2  •  JARDIANCE 25 MG tablet, Take 1 tablet by mouth Daily., Disp: , Rfl:   •  metFORMIN (GLUCOPHAGE) 1000 MG tablet, take 1 tablet by mouth twice a day with food, Disp: 180 tablet, Rfl: 1  •  Multiple Vitamins-Minerals (MULTIVITAMIN ADULTS 50+ PO), Take 1 tablet by mouth Daily., Disp: , Rfl:   •  tadalafil (Cialis) 10 MG tablet, Take 1 tablet by mouth Daily As Needed for Erectile Dysfunction., Disp: 30 tablet, Rfl: 3    Physical Exam:    Physical Exam  HENT:      Head: Normocephalic and atraumatic.      Right Ear: Tympanic membrane normal. There is impacted cerumen.      Left Ear: Tympanic membrane normal. There is impacted cerumen.      Nose: Nose normal.   Eyes:      Extraocular Movements: Extraocular movements intact.      Conjunctiva/sclera: Conjunctivae normal.      Pupils: Pupils are equal, round, and reactive to light.   Neck:      Musculoskeletal: Normal range of motion and neck supple.   Cardiovascular:      Rate and Rhythm: Normal rate and regular rhythm.      Pulses: Normal pulses.      Heart sounds: Normal heart sounds.   Pulmonary:       Effort: Pulmonary effort is normal.      Breath sounds: Normal breath sounds. No wheezing.   Skin:     General: Skin is warm and dry.   Neurological:      General: No focal deficit present.      Mental Status: He is alert and oriented to person, place, and time. Mental status is at baseline.         Ear Cerumen Removal    Date/Time: 3/5/2021 6:39 AM  Performed by: Cherie Calhoun PA-C  Authorized by: Cherie Calhoun PA-C     Anesthesia:  Local Anesthetic: none  Location details: left ear and right ear  Patient tolerance: patient tolerated the procedure well with no immediate complications  Comments: Removal first attempted with curette.    Procedure type: instrumentation and irrigation   Sedation:  Patient sedated: no                Assessment/Plan   Problem List Items Addressed This Visit        ENT    Bilateral impacted cerumen - Primary    Overview     Successful irrigation of the left ear.  Will refer to ENT for right ear.          Relevant Orders    Ear Cerumen Removal          Return if symptoms worsen or fail to improve.    Plan of care reviewed with patient at the conclusion of today's visit. Education was provided regarding diagnosis, management, and any prescribed or recommended OTC medications.Patient verbalizes understanding of and agreement with management plan.       Cherie Calhoun PA-C    Please note that portions of this note were completed with a voice recognition program. Efforts were made to edit the dictations, but occasionally words are mistranscribed.

## 2021-03-05 PROBLEM — H61.23 BILATERAL IMPACTED CERUMEN: Status: ACTIVE | Noted: 2021-03-05

## 2021-03-05 PROCEDURE — 69210 REMOVE IMPACTED EAR WAX UNI: CPT | Performed by: PHYSICIAN ASSISTANT

## 2021-03-24 RX ORDER — ESCITALOPRAM OXALATE 10 MG/1
10 TABLET ORAL DAILY
Qty: 90 TABLET | Refills: 1 | Status: SHIPPED | OUTPATIENT
Start: 2021-03-24 | End: 2021-06-23 | Stop reason: SDUPTHER

## 2021-04-07 RX ORDER — EMPAGLIFLOZIN 25 MG/1
TABLET, FILM COATED ORAL
Qty: 90 TABLET | Refills: 1 | Status: SHIPPED | OUTPATIENT
Start: 2021-04-07 | End: 2021-07-08 | Stop reason: SDUPTHER

## 2021-04-13 RX ORDER — BLOOD SUGAR DIAGNOSTIC
STRIP MISCELLANEOUS
Qty: 50 EACH | Refills: 3 | Status: SHIPPED | OUTPATIENT
Start: 2021-04-13 | End: 2021-08-23

## 2021-05-21 ENCOUNTER — OFFICE VISIT (OUTPATIENT)
Dept: ENDOCRINOLOGY | Facility: CLINIC | Age: 71
End: 2021-05-21

## 2021-05-21 ENCOUNTER — OFFICE VISIT (OUTPATIENT)
Dept: DIABETES SERVICES | Facility: HOSPITAL | Age: 71
End: 2021-05-21

## 2021-05-21 VITALS
WEIGHT: 182.2 LBS | HEART RATE: 70 BPM | HEIGHT: 72 IN | BODY MASS INDEX: 24.68 KG/M2 | OXYGEN SATURATION: 97 % | DIASTOLIC BLOOD PRESSURE: 56 MMHG | SYSTOLIC BLOOD PRESSURE: 116 MMHG

## 2021-05-21 DIAGNOSIS — E11.9 TYPE 2 DIABETES MELLITUS WITHOUT COMPLICATION, WITHOUT LONG-TERM CURRENT USE OF INSULIN (HCC): Primary | ICD-10-CM

## 2021-05-21 DIAGNOSIS — I10 ESSENTIAL HYPERTENSION: ICD-10-CM

## 2021-05-21 DIAGNOSIS — E78.2 MIXED HYPERLIPIDEMIA: ICD-10-CM

## 2021-05-21 LAB
EXPIRATION DATE: ABNORMAL
EXPIRATION DATE: NORMAL
GLUCOSE BLDC GLUCOMTR-MCNC: 254 MG/DL (ref 70–130)
HBA1C MFR BLD: 7.1 %
Lab: ABNORMAL
Lab: NORMAL

## 2021-05-21 PROCEDURE — G0108 DIAB MANAGE TRN  PER INDIV: HCPCS

## 2021-05-21 PROCEDURE — 99214 OFFICE O/P EST MOD 30 MIN: CPT | Performed by: INTERNAL MEDICINE

## 2021-05-21 PROCEDURE — 83036 HEMOGLOBIN GLYCOSYLATED A1C: CPT | Performed by: INTERNAL MEDICINE

## 2021-05-21 PROCEDURE — 82947 ASSAY GLUCOSE BLOOD QUANT: CPT | Performed by: INTERNAL MEDICINE

## 2021-05-21 NOTE — CONSULTS
Diabetes Education    Patient Name:  Francisco Gonzalez  YOB: 1950  MRN: [id number]  Admit Date:  (Not on file)      30 min initial DM nutrition education provided as walk in appt per provider request. Pt has had DM education in the past but wanted to discuss specific diet changes to improve BG and A1C. Pt was very receptive to education, encouraged him to call with additional questions. See full session details under Media tab. Thank you for the consult.      Electronically signed by:  Ela Piper  05/21/21 11:27 EDT

## 2021-05-21 NOTE — PROGRESS NOTES
"     Office Note      Date: 2021  Patient Name: Francisco Gonzalez  MRN: [id number]  : 1950    Chief Complaint   Patient presents with   • Diabetes       History of Present Illness:   Francisco Gonzalez is a 70 y.o. male who presents for Diabetes type 2. Diagnosed in: . Treated in past with oral agents. Current treatments: metformin and jardiance. Number of insulin shots per day: none. Checks blood sugar 1 times a day. Has low blood sugar: no. Aspirin use: Yes. Statin use: Yes. ACE-I/ARB use: No - no indication. Changes in health since last visit: none. Last eye exam .    Subjective      Diabetic Complications:  Eyes: No  Kidneys: No  Feet: No  Heart: No    Diet and Exercise:  Meals per day: 3  Minutes of exercise per week: 300 mins.    Review of Systems:   Review of Systems   Constitutional: Negative.    Cardiovascular: Negative.    Gastrointestinal: Negative.    Endocrine: Negative.        The following portions of the patient's history were reviewed and updated as appropriate: allergies, current medications, past family history, past medical history, past social history, past surgical history and problem list.    Objective       Visit Vitals  /56   Pulse 70   Ht 182.9 cm (72\")   Wt 82.6 kg (182 lb 3.2 oz)   SpO2 97%   BMI 24.71 kg/m²       Physical Exam:  Physical Exam  Constitutional:       Appearance: Normal appearance.   Neurological:      Mental Status: He is alert.         Labs:    HbA1c  Lab Results   Component Value Date    HGBA1C 7.1 2021       CMP  Lab Results   Component Value Date    GLUCOSE 139 (H) 2020    BUN 22 2020    CREATININE 0.81 2020    EGFRIFNONA 95 2020    BCR 27.2 (H) 2020    K 4.6 2020    CO2 26.1 2020    CALCIUM 9.1 2020    AST 21 2020    ALT 24 2020        Lipid Panel  Lab Results   Component Value Date    HDL 71 (H) 2020    LDL 76 2020    TRIG 50 2020        TSH  Lab Results "   Component Value Date    TSH 1.420 02/02/2021        Hemoglobin A1C  Lab Results   Component Value Date    HGBA1C 7.1 05/21/2021        Microalbumin/Creatinine  Lab Results   Component Value Date    MALBCREYANELYO  12/31/2020      Comment:      Unable to calculate    MICROALBUR <1.2 12/31/2020           Assessment / Plan      Assessment & Plan:  Diagnoses and all orders for this visit:    1. Type 2 diabetes mellitus without complication, without long-term current use of insulin (CMS/[facility]) (Primary)  Assessment & Plan:  Diabetes is worsening.   Continue current treatment regimen.  Work on diet and more exercise.  He would like to meet with RD about diet.  Diabetes will be reassessed in 3 months.    Orders:  -     POC Glycosylated Hemoglobin (Hb A1C)  -     POC Glucose, Blood  -     Ambulatory Referral to Diabetic Education    2. Essential hypertension  Assessment & Plan:  Hypertension is unchanged.  Continue current treatment regimen.  Blood pressure will be reassessed at the next regular appointment.      3. Mixed hyperlipidemia  Assessment & Plan:  Continue statin.        Return in about 3 months (around 8/21/2021) for Recheck with A1c.    Jose Allen MD   05/21/2021

## 2021-05-21 NOTE — ASSESSMENT & PLAN NOTE
Diabetes is worsening.   Continue current treatment regimen.  Work on diet and more exercise.  He would like to meet with RD about diet.  Diabetes will be reassessed in 3 months.

## 2021-06-23 RX ORDER — ESCITALOPRAM OXALATE 10 MG/1
10 TABLET ORAL DAILY
Qty: 30 TABLET | Refills: 2 | Status: SHIPPED | OUTPATIENT
Start: 2021-06-23 | End: 2021-12-06 | Stop reason: HOSPADM

## 2021-06-28 RX ORDER — ATORVASTATIN CALCIUM 20 MG/1
20 TABLET, FILM COATED ORAL DAILY
Qty: 90 TABLET | Refills: 1 | Status: SHIPPED | OUTPATIENT
Start: 2021-06-28 | End: 2022-02-02 | Stop reason: SDUPTHER

## 2021-07-02 DIAGNOSIS — E78.2 MIXED HYPERLIPIDEMIA: Primary | ICD-10-CM

## 2021-07-02 DIAGNOSIS — Z11.59 ENCOUNTER FOR HEPATITIS C SCREENING TEST FOR LOW RISK PATIENT: ICD-10-CM

## 2021-07-06 ENCOUNTER — LAB (OUTPATIENT)
Dept: LAB | Facility: HOSPITAL | Age: 71
End: 2021-07-06

## 2021-07-06 DIAGNOSIS — Z11.59 ENCOUNTER FOR HEPATITIS C SCREENING TEST FOR LOW RISK PATIENT: ICD-10-CM

## 2021-07-06 DIAGNOSIS — E78.2 MIXED HYPERLIPIDEMIA: ICD-10-CM

## 2021-07-06 LAB
ALBUMIN SERPL-MCNC: 4.2 G/DL (ref 3.5–5.2)
ALBUMIN/GLOB SERPL: 1.9 G/DL
ALP SERPL-CCNC: 77 U/L (ref 39–117)
ALT SERPL W P-5'-P-CCNC: 28 U/L (ref 1–41)
ANION GAP SERPL CALCULATED.3IONS-SCNC: 8.3 MMOL/L (ref 5–15)
AST SERPL-CCNC: 18 U/L (ref 1–40)
BILIRUB SERPL-MCNC: 0.3 MG/DL (ref 0–1.2)
BUN SERPL-MCNC: 21 MG/DL (ref 8–23)
BUN/CREAT SERPL: 18.3 (ref 7–25)
CALCIUM SPEC-SCNC: 9.2 MG/DL (ref 8.6–10.5)
CHLORIDE SERPL-SCNC: 106 MMOL/L (ref 98–107)
CHOLEST SERPL-MCNC: 137 MG/DL (ref 0–200)
CO2 SERPL-SCNC: 23.7 MMOL/L (ref 22–29)
CREAT SERPL-MCNC: 1.15 MG/DL (ref 0.76–1.27)
GFR SERPL CREATININE-BSD FRML MDRD: 63 ML/MIN/1.73
GLOBULIN UR ELPH-MCNC: 2.2 GM/DL
GLUCOSE SERPL-MCNC: 127 MG/DL (ref 65–99)
HCV AB SER DONR QL: NORMAL
HDLC SERPL-MCNC: 55 MG/DL (ref 40–60)
LDLC SERPL CALC-MCNC: 68 MG/DL (ref 0–100)
LDLC/HDLC SERPL: 1.24 {RATIO}
POTASSIUM SERPL-SCNC: 4.5 MMOL/L (ref 3.5–5.2)
PROT SERPL-MCNC: 6.4 G/DL (ref 6–8.5)
SODIUM SERPL-SCNC: 138 MMOL/L (ref 136–145)
TRIGL SERPL-MCNC: 69 MG/DL (ref 0–150)
VLDLC SERPL-MCNC: 14 MG/DL (ref 5–40)

## 2021-07-06 PROCEDURE — 86803 HEPATITIS C AB TEST: CPT

## 2021-07-06 PROCEDURE — 80053 COMPREHEN METABOLIC PANEL: CPT

## 2021-07-06 PROCEDURE — 80061 LIPID PANEL: CPT

## 2021-07-06 PROCEDURE — 36415 COLL VENOUS BLD VENIPUNCTURE: CPT

## 2021-07-07 ENCOUNTER — OFFICE VISIT (OUTPATIENT)
Dept: INTERNAL MEDICINE | Facility: CLINIC | Age: 71
End: 2021-07-07

## 2021-07-07 VITALS
WEIGHT: 179.6 LBS | DIASTOLIC BLOOD PRESSURE: 78 MMHG | SYSTOLIC BLOOD PRESSURE: 120 MMHG | TEMPERATURE: 98 F | BODY MASS INDEX: 24.33 KG/M2 | HEIGHT: 72 IN | HEART RATE: 76 BPM

## 2021-07-07 DIAGNOSIS — I10 ESSENTIAL HYPERTENSION: Primary | ICD-10-CM

## 2021-07-07 DIAGNOSIS — E11.9 TYPE 2 DIABETES MELLITUS WITHOUT COMPLICATION, WITHOUT LONG-TERM CURRENT USE OF INSULIN (HCC): ICD-10-CM

## 2021-07-07 DIAGNOSIS — F41.1 ANXIETY STATE: ICD-10-CM

## 2021-07-07 DIAGNOSIS — E78.2 MIXED HYPERLIPIDEMIA: ICD-10-CM

## 2021-07-07 PROBLEM — R07.9 EXERTIONAL CHEST PAIN: Status: RESOLVED | Noted: 2017-10-18 | Resolved: 2021-07-07

## 2021-07-07 PROBLEM — H61.21 IMPACTED CERUMEN OF RIGHT EAR: Status: RESOLVED | Noted: 2019-12-06 | Resolved: 2021-07-07

## 2021-07-07 PROBLEM — H61.22 IMPACTED CERUMEN OF LEFT EAR: Status: RESOLVED | Noted: 2020-05-19 | Resolved: 2021-07-07

## 2021-07-07 PROBLEM — R07.89 ATYPICAL CHEST PAIN: Status: RESOLVED | Noted: 2019-06-13 | Resolved: 2021-07-07

## 2021-07-07 PROBLEM — H61.23 BILATERAL IMPACTED CERUMEN: Status: RESOLVED | Noted: 2021-03-05 | Resolved: 2021-07-07

## 2021-07-07 PROCEDURE — 99214 OFFICE O/P EST MOD 30 MIN: CPT | Performed by: INTERNAL MEDICINE

## 2021-07-07 NOTE — PROGRESS NOTES
[facility]     Francisco Gonzalez  1950   [id number]      Patient Care Team:  Newton Cruz MD as PCP - General (Internal Medicine)  Gisselle Izaguirre PA-C as Physician Assistant (Internal Medicine)    Chief Complaint::   Chief Complaint   Patient presents with   • Hyperlipidemia   • Hypertension   • Diabetes        HPI  Mr. Gonzalez comes in for follow-up of his hypertension, hyperlipidemia, anxiety and diabetes.  He continues to see endocrinology for diabetes.  Despite Metformin, Jardiance and attention to diet his A1c was 7.1.  He is disappointed with this.  He feels well.  He remains very active.  His strength and stamina are good.  He is gradually weaning off Lexapro.  He is currently on 5 mg a day without increased anxiety.    Chronic Conditions:      Patient Active Problem List   Diagnosis   • Essential hypertension   • Mixed hyperlipidemia   • Asthma   • Sensorineural hearing loss (SNHL) of both ears   • Allergic rhinitis   • Acute prostatitis   • Diabetes mellitus, type II (CMS/HCC)   • Hypertrophy of prostate without urinary obstruction   • Anxiety state   • Annual physical exam        Past Medical History:   Diagnosis Date   • Anxiety    • Chicken pox    • Colonic polyp    • Hydrocele 2001    left; repaired   • Hyperlipidemia    • Measles    • Quadriceps tendon rupture 2010   • Type 2 diabetes mellitus (CMS/HCC)        Past Surgical History:   Procedure Laterality Date   • APPENDECTOMY  1986   • HYDROCELE EXCISION / REPAIR Left 2001   • KNEE SURGERY      left knee   • QUADRICEPS TENDON REPAIR  2010    left quadracepts tendon rupture   • VASECTOMY         Family History   Problem Relation Age of Onset   • Hypertension Mother    • Heart attack Father         MI   • Coronary artery disease Father 59        premature   • No Known Problems Sister    • Hyperlipidemia Brother        Social History     Socioeconomic History   • Marital status:      Spouse name: Not on file   •  Number of children: Not on file   • Years of education: Not on file   • Highest education level: Not on file   Tobacco Use   • Smoking status: Never Smoker   • Smokeless tobacco: Never Used   Vaping Use   • Vaping Use: Never used   Substance and Sexual Activity   • Alcohol use: Yes     Types: 1 Cans of beer per week   • Drug use: No   • Sexual activity: Defer       No Known Allergies      Current Outpatient Medications:   •  ACCU-CHEK FASTCLIX LANCETS misc, TEST BLOOD GLUCOSE BID, Disp: , Rfl: 6  •  Accu-Chek Guide test strip, Test blood Glucose once daily, Disp: 50 each, Rfl: 3  •  alfuzosin (UROXATRAL) 10 MG 24 hr tablet, Take 10 mg by mouth Daily., Disp: , Rfl:   •  ALPRAZolam (XANAX) 0.5 MG tablet, Take 1 tablet by mouth At Night As Needed for Anxiety or Sleep., Disp: 30 tablet, Rfl: 0  •  APPLE CIDER VINEGAR PO, Take 480 mg by mouth Daily., Disp: , Rfl:   •  aspirin 81 MG tablet, Take 81 mg by mouth Daily., Disp: , Rfl:   •  atorvastatin (LIPITOR) 20 MG tablet, Take 1 tablet by mouth Daily., Disp: 90 tablet, Rfl: 1  •  Cream Base cream, Hydrocortisone 2.5%-nitroglycerin 0.2% ointment: Apply a small amount to the appropriate area twice a day as needed, Disp: 30 g, Rfl: 1  •  DiphenhydrAMINE HCl, Sleep, (ZZZQUIL PO), Take As Directed., Disp: , Rfl:   •  escitalopram (LEXAPRO) 10 MG tablet, Take 1 tablet by mouth Daily., Disp: 30 tablet, Rfl: 2  •  fluticasone (Flonase) 50 MCG/ACT nasal spray, 2 sprays into the nostril(s) as directed by provider Daily., Disp: 1 bottle, Rfl: 5  •  Jardiance 25 MG tablet, TAKE (1) TABLET BY MOUTH EVERY DAY., Disp: 90 tablet, Rfl: 1  •  metFORMIN (GLUCOPHAGE) 1000 MG tablet, Take 1 tablet by mouth 2 (Two) Times a Day With Meals., Disp: 180 tablet, Rfl: 3  •  Multiple Vitamins-Minerals (MULTIVITAMIN ADULTS 50+ PO), Take 1 tablet by mouth Daily., Disp: , Rfl:   •  tadalafil (Cialis) 10 MG tablet, Take 1 tablet by mouth Daily As Needed for Erectile Dysfunction., Disp: 30 tablet, Rfl:  "3  •  Hydrocortisone Acetate (PROCTOSOL RE), Insert 1 application into the rectum As Needed., Disp: , Rfl:   •  Hydrocortisone, Perianal, (ANUSOL-HC) 2.5 % rectal cream, Insert  into the rectum 2 (Two) Times a Day., Disp: 30 g, Rfl: 2    Review of Systems   Constitutional: Negative.    Respiratory: Negative.  Negative for chest tightness and shortness of breath.    Cardiovascular: Negative.  Negative for chest pain.   Gastrointestinal: Negative for abdominal pain, blood in stool, constipation and diarrhea.        Vital Signs  Vitals:    07/07/21 1046   BP: 120/78   BP Location: Right arm   Patient Position: Sitting   Cuff Size: Adult   Pulse: 76   Temp: 98 °F (36.7 °C)   Weight: 81.5 kg (179 lb 9.6 oz)   Height: 182.9 cm (72.01\")   PainSc: 0-No pain       Physical Exam  Vitals reviewed.   Constitutional:       Appearance: He is well-developed.   HENT:      Head: Normocephalic and atraumatic.   Cardiovascular:      Rate and Rhythm: Normal rate and regular rhythm.      Pulses:           Dorsalis pedis pulses are 1+ on the right side and 1+ on the left side.      Heart sounds: Normal heart sounds. No murmur heard.     Pulmonary:      Effort: Pulmonary effort is normal.      Breath sounds: Normal breath sounds.   Musculoskeletal:      Right foot: No deformity.      Left foot: No deformity.   Feet:      Right foot:      Protective Sensation: 5 sites tested. 5 sites sensed.      Skin integrity: Skin integrity normal.      Left foot:      Protective Sensation: 5 sites tested. 5 sites sensed.      Skin integrity: Skin integrity normal.      Comments: Sensation in both feet intact to monofilament.     Diabetic Foot Exam Performed and Monofilament Test Performed    Skin:     Comments: Scattered lentigines and seborrheic keratoses.  No suspicious lesions.   Neurological:      Mental Status: He is alert and oriented to person, place, and time.          Procedures    ACE III MINI             Assessment/Plan:    Diagnoses and all " orders for this visit:    1. Essential hypertension (Primary)    2. Mixed hyperlipidemia    3. Anxiety state    4. Type 2 diabetes mellitus without complication, without long-term current use of insulin (CMS/[facility])    Plan    Blood pressure remains well controlled on nonpharmacologic therapy.    Lipids are well controlled with healthy diet and atorvastatin.    He will continue to wean Lexapro.  He may now reduce to 5 mg every other day for 2 weeks and every third day for a week or 2 then he may discontinue.    We discussed the fact that in most type II diabetic status an A1c of 7.1 is excellent.  However we have set higher standards for him.  He will continue working on healthy diet.  I gave him some references on carb restriction.    Patient's Body mass index is 24.35 kg/m². indicating that he is within normal range (BMI 18.5-24.9). No BMI management plan needed..        Plan of care reviewed with patient at the conclusion of today's visit. Education was provided regarding diagnosis, management, and any prescribed or recommended OTC medications.Patient verbalizes understanding of and agreement with management plan.         Newton Cruz MD           Answers for HPI/ROS submitted by the patient on 7/5/2021  What is the primary reason for your visit?: Physical

## 2021-07-15 RX ORDER — LANCETS
EACH MISCELLANEOUS
Qty: 200 EACH | Refills: 1 | Status: SHIPPED | OUTPATIENT
Start: 2021-07-15 | End: 2023-03-22

## 2021-07-26 ENCOUNTER — TELEPHONE (OUTPATIENT)
Dept: INTERNAL MEDICINE | Facility: CLINIC | Age: 71
End: 2021-07-26

## 2021-07-26 DIAGNOSIS — F41.1 ANXIETY STATE: ICD-10-CM

## 2021-07-26 RX ORDER — ALPRAZOLAM 0.5 MG/1
0.5 TABLET ORAL NIGHTLY PRN
Qty: 30 TABLET | Refills: 0 | Status: SHIPPED | OUTPATIENT
Start: 2021-07-26 | End: 2023-01-25 | Stop reason: SDUPTHER

## 2021-07-26 NOTE — TELEPHONE ENCOUNTER
Rec'd fax from [facility] for refill atorvastatin. Faxed back ([phone]) this was sent 6/28/21  #[phone] with 1 refill and receipt confirmed by pharm

## 2021-08-23 ENCOUNTER — OFFICE VISIT (OUTPATIENT)
Dept: ENDOCRINOLOGY | Facility: CLINIC | Age: 71
End: 2021-08-23

## 2021-08-23 VITALS
HEIGHT: 72 IN | SYSTOLIC BLOOD PRESSURE: 102 MMHG | WEIGHT: 178 LBS | HEART RATE: 68 BPM | OXYGEN SATURATION: 98 % | BODY MASS INDEX: 24.11 KG/M2 | DIASTOLIC BLOOD PRESSURE: 62 MMHG

## 2021-08-23 DIAGNOSIS — E11.65 TYPE 2 DIABETES MELLITUS WITH HYPERGLYCEMIA, WITHOUT LONG-TERM CURRENT USE OF INSULIN (HCC): Primary | Chronic | ICD-10-CM

## 2021-08-23 PROBLEM — I10 ESSENTIAL HYPERTENSION: Chronic | Status: ACTIVE | Noted: 2017-10-18

## 2021-08-23 PROBLEM — E78.2 MIXED HYPERLIPIDEMIA: Chronic | Status: ACTIVE | Noted: 2017-10-18

## 2021-08-23 PROBLEM — I10 ESSENTIAL HYPERTENSION: Chronic | Status: RESOLVED | Noted: 2017-10-18 | Resolved: 2021-08-23

## 2021-08-23 LAB
EXPIRATION DATE: ABNORMAL
EXPIRATION DATE: NORMAL
GLUCOSE BLDC GLUCOMTR-MCNC: 214 MG/DL (ref 70–130)
HBA1C MFR BLD: 6.8 %
Lab: ABNORMAL
Lab: NORMAL

## 2021-08-23 PROCEDURE — 99213 OFFICE O/P EST LOW 20 MIN: CPT | Performed by: PHYSICIAN ASSISTANT

## 2021-08-23 PROCEDURE — 82947 ASSAY GLUCOSE BLOOD QUANT: CPT | Performed by: PHYSICIAN ASSISTANT

## 2021-08-23 RX ORDER — BLOOD SUGAR DIAGNOSTIC
STRIP MISCELLANEOUS
Qty: 100 EACH | Refills: 11 | Status: SHIPPED | OUTPATIENT
Start: 2021-08-23 | End: 2021-10-12

## 2021-10-11 DIAGNOSIS — E11.65 TYPE 2 DIABETES MELLITUS WITH HYPERGLYCEMIA, WITHOUT LONG-TERM CURRENT USE OF INSULIN (HCC): Chronic | ICD-10-CM

## 2021-10-12 RX ORDER — BLOOD SUGAR DIAGNOSTIC
STRIP MISCELLANEOUS
Qty: 100 EACH | Refills: 1 | Status: SHIPPED | OUTPATIENT
Start: 2021-10-12 | End: 2022-05-23 | Stop reason: SDUPTHER

## 2021-12-06 ENCOUNTER — OFFICE VISIT (OUTPATIENT)
Dept: ENDOCRINOLOGY | Facility: CLINIC | Age: 71
End: 2021-12-06

## 2021-12-06 ENCOUNTER — LAB (OUTPATIENT)
Dept: LAB | Facility: HOSPITAL | Age: 71
End: 2021-12-06

## 2021-12-06 VITALS
HEIGHT: 72 IN | DIASTOLIC BLOOD PRESSURE: 64 MMHG | HEART RATE: 66 BPM | OXYGEN SATURATION: 98 % | BODY MASS INDEX: 23.57 KG/M2 | WEIGHT: 174 LBS | SYSTOLIC BLOOD PRESSURE: 110 MMHG

## 2021-12-06 DIAGNOSIS — E11.65 TYPE 2 DIABETES MELLITUS WITH HYPERGLYCEMIA, WITHOUT LONG-TERM CURRENT USE OF INSULIN (HCC): Primary | Chronic | ICD-10-CM

## 2021-12-06 DIAGNOSIS — E11.65 TYPE 2 DIABETES MELLITUS WITH HYPERGLYCEMIA, WITHOUT LONG-TERM CURRENT USE OF INSULIN (HCC): Chronic | ICD-10-CM

## 2021-12-06 LAB
EXPIRATION DATE: ABNORMAL
EXPIRATION DATE: NORMAL
GLUCOSE BLDC GLUCOMTR-MCNC: 206 MG/DL (ref 70–130)
HBA1C MFR BLD: 6.9 %
Lab: ABNORMAL
Lab: NORMAL

## 2021-12-06 PROCEDURE — 99213 OFFICE O/P EST LOW 20 MIN: CPT | Performed by: PHYSICIAN ASSISTANT

## 2021-12-06 PROCEDURE — 3044F HG A1C LEVEL LT 7.0%: CPT | Performed by: PHYSICIAN ASSISTANT

## 2021-12-06 PROCEDURE — 82570 ASSAY OF URINE CREATININE: CPT

## 2021-12-06 PROCEDURE — 83036 HEMOGLOBIN GLYCOSYLATED A1C: CPT | Performed by: PHYSICIAN ASSISTANT

## 2021-12-06 PROCEDURE — 82043 UR ALBUMIN QUANTITATIVE: CPT

## 2021-12-06 PROCEDURE — 82947 ASSAY GLUCOSE BLOOD QUANT: CPT | Performed by: PHYSICIAN ASSISTANT

## 2021-12-06 NOTE — PROGRESS NOTES
"     Office Note      Date: 2021  Patient Name: Francisco Gonzalez  MRN: [id number]  : 1950    Chief Complaint   Patient presents with   • Diabetes       History of Present Illness:   Francisco Gonzalez is a 71 y.o. male who presents today for follow up on type 2 diabetes, diagnosed in .  Known diabetic complications: none.  Current diabetic medications include metformin and Jardiance.  Past diabetic medications include none.  Current monitoring regimen: testing blood sugars 1-2 times daily.  Home blood sugar records: fasting typically 130s, then lower later in the day; average 98 for past month.  Any episodes of hypoglycemia? no  Feet: No sores or other issues.  Foot exam up to date.  Eye exam current (within one year): no, 10/7/2020, Dr. Kavin Merritt. No retinopathy. Eye exam scheduled for later this month with Dr. Brown.  Prior visit with dietician: yes - 2021  Current diet: in general, a \"healthy\" diet  Current exercise: 4 times per week, cardiovascular workout on exercise equipment and weightlifting.  ACE inhibitor/ARB: Not Indicated.  Statin: Yes, atorvastatin.  He reports waking up and having trouble getting back to sleep some nights.  He has been taking a sleep aid and occasionally NyQuil.    Subjective      Review of Systems:   Review of Systems   Constitutional: Negative.    Cardiovascular: Negative.    Gastrointestinal: Negative.    Endocrine: Negative.        The following portions of the patient's history were reviewed and updated as appropriate: allergies, current medications, past family history, past medical history, past social history, past surgical history and problem list.    Objective     Vitals:    21 0957   BP: 110/64   BP Location: Right arm   Patient Position: Sitting   Cuff Size: Adult   Pulse: 66   SpO2: 98%   Weight: 78.9 kg (174 lb)   Height: 182.9 cm (72\")     Body mass index is 23.6 kg/m².    Physical Exam  Vitals reviewed.   Constitutional:       General: " He is not in acute distress.  Neurological:      Mental Status: He is alert and oriented to person, place, and time.   Psychiatric:         Mood and Affect: Affect normal.         HEMOGLOBIN A1C  Lab Results   Component Value Date    HGBA1C 6.9 12/06/2021    HGBA1C 6.8 08/23/2021    HGBA1C 7.1 05/21/2021     GLUCOSE  Lab Results   Component Value Date    POCGLU 206 (A) 12/06/2021     CMP  Lab Results   Component Value Date    GLUCOSE 127 (H) 07/06/2021    BUN 21 07/06/2021    CREATININE 1.15 07/06/2021    EGFRIFNONA 63 07/06/2021    BCR 18.3 07/06/2021     07/06/2021    K 4.5 07/06/2021    CO2 23.7 07/06/2021    CALCIUM 9.2 07/06/2021    ALBUMIN 4.20 07/06/2021    BILITOT 0.3 07/06/2021    ALKPHOS 77 07/06/2021    AST 18 07/06/2021    ALT 28 07/06/2021     LIPID PANEL  Lab Results   Component Value Date    CHOL 137 07/06/2021    TRIG 69 07/06/2021    HDL 55 07/06/2021    LDL 68 07/06/2021     URINE MICROALBUMIN/CREATININE RATIO  Lab Results   Component Value Date    MALBCRERATIO  12/31/2020      Comment:      Unable to calculate     THYROID  Lab Results   Component Value Date    TSH 1.420 02/02/2021       Current Outpatient Medications   Medication Instructions   • Accu-Chek FastClix Lancets misc TEST BLOOD GLUCOSE TWICE DAILY   • Accu-Chek Guide test strip TEST BLOOD GLUCOSE EVERY DAY. Dx E11.65   • alfuzosin (UROXATRAL) 10 mg, Oral, Daily   • ALPRAZolam (XANAX) 0.5 mg, Oral, Nightly PRN   • aspirin 81 mg, Oral, Daily   • atorvastatin (LIPITOR) 20 mg, Oral, Daily   • Cream Base cream Hydrocortisone 2.5%-nitroglycerin 0.2% ointment: Apply a small amount to the appropriate area twice a day as needed   • DiphenhydrAMINE HCl, Sleep, (ZZZQUIL PO) Nightly PRN   • empagliflozin (JARDIANCE) 25 mg, Oral, Daily   • fluticasone (Flonase) 50 MCG/ACT nasal spray 2 sprays, Nasal, Daily   • Hydrocortisone Acetate (PROCTOSOL RE) 1 application, Rectal, As Needed   • Hydrocortisone, Perianal, (ANUSOL-HC) 2.5 % rectal cream  Rectal, 2 Times Daily   • magnesium oxide (MAGOX) 400 mg, Oral, Daily   • metFORMIN (GLUCOPHAGE) 1,000 mg, Oral, 2 Times Daily With Meals   • Multiple Vitamins-Minerals (MULTIVITAMIN ADULTS 50+ PO) 1 tablet, Oral, Daily   • tadalafil (CIALIS) 10 mg, Oral, Daily PRN       Assessment / Plan      Assessment & Plan:  1. Type 2 diabetes mellitus with hyperglycemia, without long-term current use of insulin ([facility])  A1c at goal.  Continue Jardiance and metformin.  He will continue nutritious dietary choices, moderation of carbohydrates, regular exercise.  BP okay.  Routine labs up to date.  Time for urine protein, so will check this today.  - POC Glycosylated Hemoglobin (Hb A1C)  - POC Glucose, Blood  - Microalbumin / Creatinine Urine Ratio - Urine, Clean Catch; Future      Return in about 3 months (around 3/6/2022) for recheck with A1c, foot exam. He was advised to contact the office with any interval questions or concerns.    YAYA Malone  Endocrinology  12/06/2021

## 2021-12-07 LAB
ALBUMIN UR-MCNC: <1.2 MG/DL
CREAT UR-MCNC: 61.4 MG/DL
MICROALBUMIN/CREAT UR: NORMAL MG/G{CREAT}

## 2022-01-04 DIAGNOSIS — E11.65 TYPE 2 DIABETES MELLITUS WITH HYPERGLYCEMIA, WITHOUT LONG-TERM CURRENT USE OF INSULIN: ICD-10-CM

## 2022-01-04 DIAGNOSIS — E78.2 MIXED HYPERLIPIDEMIA: Primary | ICD-10-CM

## 2022-01-04 DIAGNOSIS — Z12.5 PROSTATE CANCER SCREENING: ICD-10-CM

## 2022-01-05 ENCOUNTER — LAB (OUTPATIENT)
Dept: LAB | Facility: HOSPITAL | Age: 72
End: 2022-01-05

## 2022-01-05 DIAGNOSIS — E78.2 MIXED HYPERLIPIDEMIA: ICD-10-CM

## 2022-01-05 DIAGNOSIS — E11.65 TYPE 2 DIABETES MELLITUS WITH HYPERGLYCEMIA, WITHOUT LONG-TERM CURRENT USE OF INSULIN: ICD-10-CM

## 2022-01-05 DIAGNOSIS — Z12.5 PROSTATE CANCER SCREENING: ICD-10-CM

## 2022-01-05 LAB
ALBUMIN SERPL-MCNC: 4.4 G/DL (ref 3.5–5.2)
ALBUMIN UR-MCNC: <1.2 MG/DL
ALBUMIN/GLOB SERPL: 2.3 G/DL
ALP SERPL-CCNC: 74 U/L (ref 39–117)
ALT SERPL W P-5'-P-CCNC: 24 U/L (ref 1–41)
ANION GAP SERPL CALCULATED.3IONS-SCNC: 8.9 MMOL/L (ref 5–15)
AST SERPL-CCNC: 17 U/L (ref 1–40)
BASOPHILS # BLD AUTO: 0.05 10*3/MM3 (ref 0–0.2)
BASOPHILS NFR BLD AUTO: 1 % (ref 0–1.5)
BILIRUB SERPL-MCNC: 0.4 MG/DL (ref 0–1.2)
BUN SERPL-MCNC: 24 MG/DL (ref 8–23)
BUN/CREAT SERPL: 20.2 (ref 7–25)
CALCIUM SPEC-SCNC: 9.6 MG/DL (ref 8.6–10.5)
CHLORIDE SERPL-SCNC: 104 MMOL/L (ref 98–107)
CHOLEST SERPL-MCNC: 173 MG/DL (ref 0–200)
CO2 SERPL-SCNC: 27.1 MMOL/L (ref 22–29)
CREAT SERPL-MCNC: 1.19 MG/DL (ref 0.76–1.27)
CREAT UR-MCNC: 91.2 MG/DL
DEPRECATED RDW RBC AUTO: 41.8 FL (ref 37–54)
EOSINOPHIL # BLD AUTO: 0.54 10*3/MM3 (ref 0–0.4)
EOSINOPHIL NFR BLD AUTO: 11.1 % (ref 0.3–6.2)
ERYTHROCYTE [DISTWIDTH] IN BLOOD BY AUTOMATED COUNT: 12.6 % (ref 12.3–15.4)
GFR SERPL CREATININE-BSD FRML MDRD: 61 ML/MIN/1.73
GLOBULIN UR ELPH-MCNC: 1.9 GM/DL
GLUCOSE SERPL-MCNC: 131 MG/DL (ref 65–99)
HCT VFR BLD AUTO: 48.7 % (ref 37.5–51)
HDLC SERPL-MCNC: 70 MG/DL (ref 40–60)
HGB BLD-MCNC: 16.4 G/DL (ref 13–17.7)
IMM GRANULOCYTES # BLD AUTO: 0.01 10*3/MM3 (ref 0–0.05)
IMM GRANULOCYTES NFR BLD AUTO: 0.2 % (ref 0–0.5)
LDLC SERPL CALC-MCNC: 90 MG/DL (ref 0–100)
LDLC/HDLC SERPL: 1.27 {RATIO}
LYMPHOCYTES # BLD AUTO: 1.28 10*3/MM3 (ref 0.7–3.1)
LYMPHOCYTES NFR BLD AUTO: 26.2 % (ref 19.6–45.3)
MCH RBC QN AUTO: 30.7 PG (ref 26.6–33)
MCHC RBC AUTO-ENTMCNC: 33.7 G/DL (ref 31.5–35.7)
MCV RBC AUTO: 91.2 FL (ref 79–97)
MICROALBUMIN/CREAT UR: NORMAL MG/G{CREAT}
MONOCYTES # BLD AUTO: 0.51 10*3/MM3 (ref 0.1–0.9)
MONOCYTES NFR BLD AUTO: 10.5 % (ref 5–12)
NEUTROPHILS NFR BLD AUTO: 2.49 10*3/MM3 (ref 1.7–7)
NEUTROPHILS NFR BLD AUTO: 51 % (ref 42.7–76)
NRBC BLD AUTO-RTO: 0 /100 WBC (ref 0–0.2)
PLATELET # BLD AUTO: 208 10*3/MM3 (ref 140–450)
PMV BLD AUTO: 11 FL (ref 6–12)
POTASSIUM SERPL-SCNC: 4.6 MMOL/L (ref 3.5–5.2)
PROT SERPL-MCNC: 6.3 G/DL (ref 6–8.5)
PSA SERPL-MCNC: 0.8 NG/ML (ref 0–4)
RBC # BLD AUTO: 5.34 10*6/MM3 (ref 4.14–5.8)
SODIUM SERPL-SCNC: 140 MMOL/L (ref 136–145)
TRIGL SERPL-MCNC: 70 MG/DL (ref 0–150)
VLDLC SERPL-MCNC: 13 MG/DL (ref 5–40)
WBC NRBC COR # BLD: 4.88 10*3/MM3 (ref 3.4–10.8)

## 2022-01-05 PROCEDURE — 85025 COMPLETE CBC W/AUTO DIFF WBC: CPT

## 2022-01-05 PROCEDURE — G0103 PSA SCREENING: HCPCS

## 2022-01-05 PROCEDURE — 82043 UR ALBUMIN QUANTITATIVE: CPT

## 2022-01-05 PROCEDURE — 80053 COMPREHEN METABOLIC PANEL: CPT

## 2022-01-05 PROCEDURE — 80061 LIPID PANEL: CPT

## 2022-01-05 PROCEDURE — 82570 ASSAY OF URINE CREATININE: CPT

## 2022-01-10 DIAGNOSIS — Z12.11 SCREENING FOR COLON CANCER: Primary | ICD-10-CM

## 2022-01-11 ENCOUNTER — OFFICE VISIT (OUTPATIENT)
Dept: INTERNAL MEDICINE | Facility: CLINIC | Age: 72
End: 2022-01-11

## 2022-01-11 VITALS
DIASTOLIC BLOOD PRESSURE: 78 MMHG | HEART RATE: 76 BPM | BODY MASS INDEX: 24.46 KG/M2 | WEIGHT: 180.6 LBS | SYSTOLIC BLOOD PRESSURE: 124 MMHG | HEIGHT: 72 IN | TEMPERATURE: 98.2 F

## 2022-01-11 DIAGNOSIS — E78.2 MIXED HYPERLIPIDEMIA: Chronic | ICD-10-CM

## 2022-01-11 DIAGNOSIS — F41.1 ANXIETY STATE: ICD-10-CM

## 2022-01-11 DIAGNOSIS — E11.65 TYPE 2 DIABETES MELLITUS WITH HYPERGLYCEMIA, WITHOUT LONG-TERM CURRENT USE OF INSULIN: Chronic | ICD-10-CM

## 2022-01-11 DIAGNOSIS — Z00.00 ANNUAL PHYSICAL EXAM: Primary | ICD-10-CM

## 2022-01-11 PROCEDURE — G0439 PPPS, SUBSEQ VISIT: HCPCS | Performed by: INTERNAL MEDICINE

## 2022-01-11 PROCEDURE — 1170F FXNL STATUS ASSESSED: CPT | Performed by: INTERNAL MEDICINE

## 2022-01-11 PROCEDURE — 1159F MED LIST DOCD IN RCRD: CPT | Performed by: INTERNAL MEDICINE

## 2022-01-11 NOTE — PROGRESS NOTES
QUICK REFERENCE INFORMATION:  The ABCs of the Annual Wellness Visit    Subsequent Medicare Wellness Visit    HEALTH RISK ASSESSMENT    1950    Recent Hospitalizations:  No hospitalization(s) within the last year..        Current Medical Providers:  Patient Care Team:  Newton Cruz MD as PCP - General (Internal Medicine)  Gisselle Izaguirre PA-C as Physician Assistant (Internal Medicine)  Honey Phillips PA as Physician Assistant (Endocrinology)        Smoking Status:  Social History     Tobacco Use   Smoking Status Never Smoker   Smokeless Tobacco Never Used       Alcohol Consumption:  Social History     Substance and Sexual Activity   Alcohol Use Yes   • Alcohol/week: 1.0 standard drink   • Types: 1 Cans of beer per week       Depression Screen:   PHQ-2/PHQ-9 Depression Screening 1/11/2022   Little interest or pleasure in doing things 0   Feeling down, depressed, or hopeless 0   Total Score 0       Health Habits and Functional and Cognitive Screening:  Functional & Cognitive Status 1/11/2022   Do you have difficulty preparing food and eating? No   Do you have difficulty bathing yourself, getting dressed or grooming yourself? No   Do you have difficulty using the toilet? No   Do you have difficulty moving around from place to place? No   Do you have trouble with steps or getting out of a bed or a chair? No   Current Diet Well Balanced Diet   Dental Exam Up to date   Eye Exam Up to date   Exercise (times per week) 5 times per week   Current Exercises Include (No Data)        Exercise Comment weight training, golf   Current Exercise Activities Include -   Do you need help using the phone?  No   Are you deaf or do you have serious difficulty hearing?  No   Do you need help with transportation? No   Do you need help shopping? No   Do you need help preparing meals?  No   Do you need help with housework?  No   Do you need help with laundry? No   Do you need help taking your medications? No   Do you  need help managing money? No   Do you ever drive or ride in a car without wearing a seat belt? No   Have you felt unusual stress, anger or loneliness in the last month? No   Who do you live with? Spouse   If you need help, do you have trouble finding someone available to you? No   Have you been bothered in the last four weeks by sexual problems? No   Do you have difficulty concentrating, remembering or making decisions? No       Fall Risk Screen:  HENNA Fall Risk Assessment was completed, and patient is at LOW risk for falls.Assessment completed on:1/11/2022    ACE III MINI        Does the patient have evidence of cognitive impairment? No    Aspirin use counseling: Taking ASA appropriately as indicated    Recent Lab Results:  CMP:  Lab Results   Component Value Date    BUN 24 (H) 01/05/2022    CREATININE 1.19 01/05/2022    EGFRIFNONA 61 01/05/2022    BCR 20.2 01/05/2022     01/05/2022    K 4.6 01/05/2022    CO2 27.1 01/05/2022    CALCIUM 9.6 01/05/2022    ALBUMIN 4.40 01/05/2022    BILITOT 0.4 01/05/2022    ALKPHOS 74 01/05/2022    AST 17 01/05/2022    ALT 24 01/05/2022     HbA1c:  Lab Results   Component Value Date    HGBA1C 6.9 12/06/2021    HGBA1C 6.8 08/23/2021     Microalbumin:  Lab Results   Component Value Date    MICROALBUR <1.2 01/05/2022    POCMALB 10 mg/L 12/17/2019    POCCREAT 100 mg/dL 12/17/2019     Lipid Panel  Lab Results   Component Value Date    CHOL 173 01/05/2022    TRIG 70 01/05/2022    HDL 70 (H) 01/05/2022    LDL 90 01/05/2022    AST 17 01/05/2022    ALT 24 01/05/2022       Visual Acuity:  No exam data present    Age-appropriate Screening Schedule:  Refer to the list below for future screening recommendations based on patient's age, sex and/or medical conditions. Orders for these recommended tests are listed in the plan section. The patient has been provided with a written plan.    Health Maintenance   Topic Date Due   • ZOSTER VACCINE (3 of 3) 12/05/2019   • HEMOGLOBIN A1C  06/06/2022  "  • DIABETIC FOOT EXAM  07/08/2022   • DIABETIC EYE EXAM  12/07/2022   • LIPID PANEL  01/05/2023   • URINE MICROALBUMIN  01/05/2023   • TDAP/TD VACCINES (3 - Td or Tdap) 05/08/2024   • INFLUENZA VACCINE  Completed        Subjective   History of Present Illness    Patient verbalized consent to the visit recording.    Francisco Gonzalez is a 71 y.o. male who presents for a Subsequent Wellness Visit.    The patient states that he is doing well overall and has no new complaints at this time. He reports an improvement in his A1c is due to use of Jardiance. The patient indicates mild insomnia and reports use of vitamins, that contain 120 mg of magnesium and oly seed, from a company named \"[employer]\" that has helped improve his sleep. He takes 2 pills at night before bed that provides him about 4 to 5 hours of solid rest.     He confirms upcoming scheduled appointment on 01/27/2022 to see Dr. Cesar Blue. The patient is completely vaccinated against COVID 19 at this time.     CHRONIC CONDITIONS    The following portions of the patient's history were reviewed and updated as appropriate: allergies, current medications, past family history, past medical history, past social history, past surgical history and problem list.    Outpatient Medications Prior to Visit   Medication Sig Dispense Refill   • Accu-Chek FastClix Lancets misc TEST BLOOD GLUCOSE TWICE DAILY 200 each 1   • Accu-Chek Guide test strip TEST BLOOD GLUCOSE EVERY DAY. Dx E11.65 100 each 1   • alfuzosin (UROXATRAL) 10 MG 24 hr tablet Take 10 mg by mouth Daily.     • ALPRAZolam (XANAX) 0.5 MG tablet Take 1 tablet by mouth At Night As Needed for Anxiety or Sleep. 30 tablet 0   • aspirin 81 MG tablet Take 81 mg by mouth Daily.     • atorvastatin (LIPITOR) 20 MG tablet Take 1 tablet by mouth Daily. 90 tablet 1   • Cream Base cream Hydrocortisone 2.5%-nitroglycerin 0.2% ointment: Apply a small amount to the appropriate area twice a day as needed 30 g 1   • " DiphenhydrAMINE HCl, Sleep, (ZZZQUIL PO) At Night As Needed.     • empagliflozin (Jardiance) 25 MG tablet tablet Take 1 tablet by mouth Daily. 90 tablet 1   • fluticasone (Flonase) 50 MCG/ACT nasal spray 2 sprays into the nostril(s) as directed by provider Daily. 1 bottle 5   • Hydrocortisone Acetate (PROCTOSOL RE) Insert 1 application into the rectum As Needed.     • Hydrocortisone, Perianal, (ANUSOL-HC) 2.5 % rectal cream Insert  into the rectum 2 (Two) Times a Day. (Patient taking differently: Insert  into the rectum 2 (Two) Times a Day As Needed.) 30 g 2   • magnesium oxide (MAGOX) 400 (241.3 Mg) MG tablet tablet Take 200 mg by mouth Daily.     • metFORMIN (GLUCOPHAGE) 1000 MG tablet Take 1 tablet by mouth 2 (Two) Times a Day With Meals. 180 tablet 3   • Multiple Vitamins-Minerals (MULTIVITAMIN ADULTS 50+ PO) Take 1 tablet by mouth Daily.     • tadalafil (Cialis) 10 MG tablet Take 1 tablet by mouth Daily As Needed for Erectile Dysfunction. 30 tablet 3   • Unable to find Take 2 each by mouth Every Night. Med Name: [employer] - oly seed, magnesium 120 mg ( used as sleep aid)     • Sod Picosulfate-Mag Ox-Cit Acd 10-3.5-12 MG-GM -GM/160ML solution Take 160 mL by mouth Take As Directed for 2 doses. 320 mL 0     No facility-administered medications prior to visit.       Patient Active Problem List   Diagnosis   • Mixed hyperlipidemia   • Asthma   • Sensorineural hearing loss (SNHL) of both ears   • Allergic rhinitis   • Acute prostatitis   • Type 2 diabetes mellitus with hyperglycemia, without long-term current use of insulin (HCC)   • Hypertrophy of prostate without urinary obstruction   • Anxiety state   • Annual physical exam       Advance Care Planning:  ACP discussion was held with the patient during this visit. Patient has an advance directive (not in EMR), copy requested.    Identification of Risk Factors:  Risk factors include: Advance Directive Discussion.    Review of Systems   Constitutional: Negative for  activity change, appetite change, chills, fatigue and fever.   HENT: Negative for congestion and hearing loss.    Eyes: Negative for visual disturbance.   Respiratory: Negative for cough, shortness of breath and wheezing.    Cardiovascular: Negative for chest pain, palpitations and leg swelling.   Gastrointestinal: Negative for abdominal pain, constipation, diarrhea, nausea and vomiting.   Musculoskeletal: Negative for arthralgias and myalgias.   Skin: Negative for rash.   Neurological: Negative for dizziness, weakness and numbness.   Psychiatric/Behavioral: Negative for confusion, sleep disturbance and suicidal ideas.       Compared to one year ago, the patient feels his physical health is the same.  Compared to one year ago, the patient feels his mental health is the same.    Objective     Physical Exam  Vitals and nursing note reviewed.   Constitutional:       Appearance: Normal appearance. He is well-developed.   HENT:      Head: Normocephalic and atraumatic.      Right Ear: External ear normal.      Left Ear: External ear normal.      Nose: Nose normal.      Mouth/Throat:      Pharynx: No oropharyngeal exudate.   Eyes:      Conjunctiva/sclera: Conjunctivae normal.      Pupils: Pupils are equal, round, and reactive to light.   Neck:      Thyroid: No thyromegaly.      Vascular: No JVD.   Cardiovascular:      Rate and Rhythm: Normal rate and regular rhythm.      Pulses:           Dorsalis pedis pulses are 1+ on the right side and 1+ on the left side.      Heart sounds: Normal heart sounds. No murmur heard.  No friction rub. No gallop.    Pulmonary:      Effort: Pulmonary effort is normal. No respiratory distress.      Breath sounds: Normal breath sounds. No wheezing, rhonchi or rales.   Chest:      Chest wall: No tenderness.   Abdominal:      General: Bowel sounds are normal. There is no distension.      Palpations: Abdomen is soft. There is no mass.      Tenderness: There is no abdominal tenderness. There is no  "guarding or rebound.      Hernia: No hernia is present.   Musculoskeletal:         General: No tenderness. Normal range of motion.      Cervical back: Normal range of motion and neck supple.      Right foot: No deformity.      Left foot: No deformity.   Feet:      Right foot:      Protective Sensation: 5 sites tested. 5 sites sensed.      Skin integrity: Skin integrity normal.      Left foot:      Protective Sensation: 5 sites tested. 5 sites sensed.      Skin integrity: Skin integrity normal.      Comments: Sensation in both feet intact to monofilament.     Diabetic Foot Exam Performed and Monofilament Test Performed    Lymphadenopathy:      Cervical: No cervical adenopathy.   Skin:     General: Skin is warm and dry.      Findings: No erythema or rash.   Neurological:      Mental Status: He is alert and oriented to person, place, and time.      Cranial Nerves: No cranial nerve deficit.      Sensory: No sensory deficit.      Motor: No abnormal muscle tone.      Coordination: Coordination normal.      Deep Tendon Reflexes: Reflexes normal.   Psychiatric:         Mood and Affect: Affect normal.         Behavior: Behavior normal.         Thought Content: Thought content normal.         Judgment: Judgment normal.          Procedures     Vitals:    01/11/22 1051   BP: 124/78   BP Location: Left arm   Patient Position: Sitting   Cuff Size: Large Adult   Pulse: 76   Temp: 98.2 °F (36.8 °C)   Weight: 81.9 kg (180 lb 9.6 oz)   Height: 181.6 cm (71.5\")   PainSc: 0-No pain       Patient's Body mass index is 24.84 kg/m². indicating that he is within normal range (BMI 18.5-24.9). No BMI management plan needed..      Assessment/Plan   Problem List Items Addressed This Visit        Cardiac and Vasculature    Mixed hyperlipidemia (Chronic)      -   Lipids are extremely well controlled on atorvastatin and healthy diet.    Relevant Medications    atorvastatin (LIPITOR) 20 MG tablet       Endocrine and Metabolic    Type 2 diabetes " mellitus with hyperglycemia, without long-term current use of insulin (HCC) (Chronic)        -   A1c is well controlled at 6.9. He will continue Jardiance and metformin and follow up with endocrinology.     Relevant Medications    metFORMIN (GLUCOPHAGE) 1000 MG tablet    empagliflozin (Jardiance) 25 MG tablet tablet    Accu-Chek Guide test strip       Health Encounters    Annual physical exam - Primary     -  Overall he is doing extremely well. He is exercising regularly and now has his diabetes under control. He is completely vaccinated against COVID-19 and influenza. He has a colonoscopy scheduled for later this month and will see his urologist annually.        Mental Health    Anxiety state      -   He will use alprazolam only as needed, which is infrequently.     Relevant Medications    ALPRAZolam (XANAX) 0.5 MG tablet        BPH           -   He will continue follow up with urology as well as proceed taking Uroxatrol and tadalafil.     Patient Self-Management and Personalized Health Advice  The patient has been provided with information about: diet, exercise and weight management and preventive services including:   · Annual Wellness Visit (AWV).    Outpatient Encounter Medications as of 1/11/2022   Medication Sig Dispense Refill   • Accu-Chek FastClix Lancets misc TEST BLOOD GLUCOSE TWICE DAILY 200 each 1   • Accu-Chek Guide test strip TEST BLOOD GLUCOSE EVERY DAY. Dx E11.65 100 each 1   • alfuzosin (UROXATRAL) 10 MG 24 hr tablet Take 10 mg by mouth Daily.     • ALPRAZolam (XANAX) 0.5 MG tablet Take 1 tablet by mouth At Night As Needed for Anxiety or Sleep. 30 tablet 0   • aspirin 81 MG tablet Take 81 mg by mouth Daily.     • atorvastatin (LIPITOR) 20 MG tablet Take 1 tablet by mouth Daily. 90 tablet 1   • Cream Base cream Hydrocortisone 2.5%-nitroglycerin 0.2% ointment: Apply a small amount to the appropriate area twice a day as needed 30 g 1   • DiphenhydrAMINE HCl, Sleep, (ZZZQUIL PO) At Night As Needed.      • empagliflozin (Jardiance) 25 MG tablet tablet Take 1 tablet by mouth Daily. 90 tablet 1   • fluticasone (Flonase) 50 MCG/ACT nasal spray 2 sprays into the nostril(s) as directed by provider Daily. 1 bottle 5   • Hydrocortisone Acetate (PROCTOSOL RE) Insert 1 application into the rectum As Needed.     • Hydrocortisone, Perianal, (ANUSOL-HC) 2.5 % rectal cream Insert  into the rectum 2 (Two) Times a Day. (Patient taking differently: Insert  into the rectum 2 (Two) Times a Day As Needed.) 30 g 2   • magnesium oxide (MAGOX) 400 (241.3 Mg) MG tablet tablet Take 200 mg by mouth Daily.     • metFORMIN (GLUCOPHAGE) 1000 MG tablet Take 1 tablet by mouth 2 (Two) Times a Day With Meals. 180 tablet 3   • Multiple Vitamins-Minerals (MULTIVITAMIN ADULTS 50+ PO) Take 1 tablet by mouth Daily.     • tadalafil (Cialis) 10 MG tablet Take 1 tablet by mouth Daily As Needed for Erectile Dysfunction. 30 tablet 3   • Unable to find Take 2 each by mouth Every Night. Med Name: [employer] - oly seed, magnesium 120 mg ( used as sleep aid)     • Sod Picosulfate-Mag Ox-Cit Acd 10-3.5-12 MG-GM -GM/160ML solution Take 160 mL by mouth Take As Directed for 2 doses. 320 mL 0     No facility-administered encounter medications on file as of 1/11/2022.       Reviewed use of high risk medication in the elderly: yes  Reviewed for potential of harmful drug interactions in the elderly: yes    Follow Up:  Return in about 6 months (around 7/11/2022) for follow up.     There are no Patient Instructions on file for this visit.    An After Visit Summary and PPPS with all of these plans were given to the patient.            Transcribed from ambient dictation for Newton Cruz MD by Renita Rome.  01/11/22   15:40 EST

## 2022-01-25 DIAGNOSIS — R11.2 NAUSEA AND VOMITING, INTRACTABILITY OF VOMITING NOT SPECIFIED, UNSPECIFIED VOMITING TYPE: Primary | ICD-10-CM

## 2022-01-25 RX ORDER — ONDANSETRON 4 MG/1
4 TABLET, FILM COATED ORAL EVERY 6 HOURS
Qty: 6 TABLET | Refills: 0 | Status: SHIPPED | OUTPATIENT
Start: 2022-01-25 | End: 2023-01-25

## 2022-01-26 RX ORDER — CYCLOBENZAPRINE HCL 10 MG
10 TABLET ORAL 2 TIMES DAILY PRN
Qty: 30 TABLET | Refills: 0 | Status: SHIPPED | OUTPATIENT
Start: 2022-01-26

## 2022-01-27 ENCOUNTER — OUTSIDE FACILITY SERVICE (OUTPATIENT)
Dept: GASTROENTEROLOGY | Facility: CLINIC | Age: 72
End: 2022-01-27

## 2022-01-27 PROCEDURE — 45385 COLONOSCOPY W/LESION REMOVAL: CPT | Performed by: INTERNAL MEDICINE

## 2022-01-27 PROCEDURE — 88305 TISSUE EXAM BY PATHOLOGIST: CPT | Performed by: INTERNAL MEDICINE

## 2022-01-27 PROCEDURE — G0500 MOD SEDAT ENDO SERVICE >5YRS: HCPCS | Performed by: INTERNAL MEDICINE

## 2022-01-28 ENCOUNTER — LAB REQUISITION (OUTPATIENT)
Dept: LAB | Facility: HOSPITAL | Age: 72
End: 2022-01-28

## 2022-01-28 DIAGNOSIS — Z12.11 ENCOUNTER FOR SCREENING FOR MALIGNANT NEOPLASM OF COLON: ICD-10-CM

## 2022-01-31 ENCOUNTER — TELEPHONE (OUTPATIENT)
Dept: GASTROENTEROLOGY | Facility: CLINIC | Age: 72
End: 2022-01-31

## 2022-01-31 LAB
CYTO UR: NORMAL
LAB AP CASE REPORT: NORMAL
LAB AP CLINICAL INFORMATION: NORMAL
PATH REPORT.FINAL DX SPEC: NORMAL
PATH REPORT.GROSS SPEC: NORMAL

## 2022-01-31 NOTE — TELEPHONE ENCOUNTER
I TRIED TO CALL MR [facility] TO GIVE BIOPSY RESULTS. NO ANSWER; LEFT VOICE MESSAGE. I WILL SEND RESULTS TO PATIENT'S MY CHART AND THE MAIL.

## 2022-01-31 NOTE — TELEPHONE ENCOUNTER
----- Message from Cesar Blue MD sent at 1/31/2022  3:07 PM EST -----  Please let Babak know that he had an adenoma.  Follow-up in 5 years time is recommended.

## 2022-02-02 ENCOUNTER — PATIENT MESSAGE (OUTPATIENT)
Dept: INTERNAL MEDICINE | Facility: CLINIC | Age: 72
End: 2022-02-02

## 2022-02-02 RX ORDER — ATORVASTATIN CALCIUM 20 MG/1
20 TABLET, FILM COATED ORAL DAILY
Qty: 90 TABLET | Refills: 1 | Status: SHIPPED | OUTPATIENT
Start: 2022-02-02 | End: 2022-05-13 | Stop reason: SDUPTHER

## 2022-02-18 ENCOUNTER — PATIENT MESSAGE (OUTPATIENT)
Dept: INTERNAL MEDICINE | Facility: CLINIC | Age: 72
End: 2022-02-18

## 2022-02-18 RX ORDER — EMOLLIENT BASE
CREAM (GRAM) TOPICAL
Qty: 30 G | Refills: 1 | Status: SHIPPED | OUTPATIENT
Start: 2022-02-18 | End: 2022-12-16 | Stop reason: SDUPTHER

## 2022-02-18 NOTE — TELEPHONE ENCOUNTER
From: Francisco Gonzalez  To: Newton Cruz MD  Sent: 2/18/2022 11:27 AM EST  Subject: [employer]    Could you please send a script to the above for hydrocortisone nitroglycerin # 2.5%0.2%. 60 gms Last script was 2/17/20. [phone].  Thanks

## 2022-03-16 ENCOUNTER — OFFICE VISIT (OUTPATIENT)
Dept: ENDOCRINOLOGY | Facility: CLINIC | Age: 72
End: 2022-03-16

## 2022-03-16 VITALS
HEART RATE: 76 BPM | BODY MASS INDEX: 25.23 KG/M2 | HEIGHT: 71 IN | TEMPERATURE: 97.1 F | DIASTOLIC BLOOD PRESSURE: 70 MMHG | OXYGEN SATURATION: 97 % | SYSTOLIC BLOOD PRESSURE: 124 MMHG | WEIGHT: 180.2 LBS

## 2022-03-16 DIAGNOSIS — E11.65 TYPE 2 DIABETES MELLITUS WITH HYPERGLYCEMIA, WITHOUT LONG-TERM CURRENT USE OF INSULIN: Primary | Chronic | ICD-10-CM

## 2022-03-16 LAB
EXPIRATION DATE: NORMAL
EXPIRATION DATE: NORMAL
GLUCOSE BLDC GLUCOMTR-MCNC: 121 MG/DL (ref 70–130)
HBA1C MFR BLD: 6.9 %
Lab: NORMAL
Lab: NORMAL

## 2022-03-16 PROCEDURE — 83036 HEMOGLOBIN GLYCOSYLATED A1C: CPT | Performed by: PHYSICIAN ASSISTANT

## 2022-03-16 PROCEDURE — 82947 ASSAY GLUCOSE BLOOD QUANT: CPT | Performed by: PHYSICIAN ASSISTANT

## 2022-03-16 PROCEDURE — 3044F HG A1C LEVEL LT 7.0%: CPT | Performed by: PHYSICIAN ASSISTANT

## 2022-03-16 PROCEDURE — 99213 OFFICE O/P EST LOW 20 MIN: CPT | Performed by: PHYSICIAN ASSISTANT

## 2022-05-12 ENCOUNTER — PATIENT MESSAGE (OUTPATIENT)
Dept: INTERNAL MEDICINE | Facility: CLINIC | Age: 72
End: 2022-05-12

## 2022-05-13 RX ORDER — ATORVASTATIN CALCIUM 20 MG/1
20 TABLET, FILM COATED ORAL DAILY
Qty: 90 TABLET | Refills: 1 | Status: SHIPPED | OUTPATIENT
Start: 2022-05-13 | End: 2022-10-17

## 2022-05-13 NOTE — TELEPHONE ENCOUNTER
From: Francisco Gonzalez  To: Newton Cruz MD  Sent: 5/12/2022 8:36 PM EDT  Subject: Atorvastatin     Sanchez could send a script for 20 mg Atorvastatin to the [place] [id number] [address]. I had to change pharmacies due to insurance. 90 is fine.  Thanks

## 2022-05-23 DIAGNOSIS — E11.65 TYPE 2 DIABETES MELLITUS WITH HYPERGLYCEMIA, WITHOUT LONG-TERM CURRENT USE OF INSULIN: Chronic | ICD-10-CM

## 2022-05-23 RX ORDER — BLOOD SUGAR DIAGNOSTIC
STRIP MISCELLANEOUS
Qty: 100 EACH | Refills: 1 | Status: SHIPPED | OUTPATIENT
Start: 2022-05-23 | End: 2022-12-02

## 2022-05-23 NOTE — TELEPHONE ENCOUNTER
[facility]     Test strips    From   Francisco Gonzalez To   [facility] Clinical Pool Sent   5/22/2022 10:53 PM         Honey I have run out of test strips.  My last Rx number is [id number] qty 100 and was filled on 7/14/21.  Accu-chek guide test strips.  It was filled at [place] [id number].  [phone].  I test one to two times per day.  Label says no refills Dr Woodson required.  Thanks   Babak        0

## 2022-06-28 ENCOUNTER — OFFICE VISIT (OUTPATIENT)
Dept: ENDOCRINOLOGY | Facility: CLINIC | Age: 72
End: 2022-06-28

## 2022-06-28 VITALS
HEART RATE: 66 BPM | WEIGHT: 178 LBS | HEIGHT: 72 IN | OXYGEN SATURATION: 97 % | DIASTOLIC BLOOD PRESSURE: 62 MMHG | BODY MASS INDEX: 24.11 KG/M2 | SYSTOLIC BLOOD PRESSURE: 124 MMHG

## 2022-06-28 DIAGNOSIS — E11.65 TYPE 2 DIABETES MELLITUS WITH HYPERGLYCEMIA, WITHOUT LONG-TERM CURRENT USE OF INSULIN: Primary | Chronic | ICD-10-CM

## 2022-06-28 LAB
EXPIRATION DATE: ABNORMAL
EXPIRATION DATE: NORMAL
GLUCOSE BLDC GLUCOMTR-MCNC: 255 MG/DL (ref 70–130)
HBA1C MFR BLD: 7 %
Lab: ABNORMAL
Lab: NORMAL

## 2022-06-28 PROCEDURE — 83036 HEMOGLOBIN GLYCOSYLATED A1C: CPT | Performed by: PHYSICIAN ASSISTANT

## 2022-06-28 PROCEDURE — 82947 ASSAY GLUCOSE BLOOD QUANT: CPT | Performed by: PHYSICIAN ASSISTANT

## 2022-06-28 PROCEDURE — 3051F HG A1C>EQUAL 7.0%<8.0%: CPT | Performed by: PHYSICIAN ASSISTANT

## 2022-06-28 PROCEDURE — 99213 OFFICE O/P EST LOW 20 MIN: CPT | Performed by: PHYSICIAN ASSISTANT

## 2022-06-28 NOTE — PROGRESS NOTES
"     Office Note      Date: 2022  Patient Name: Francisco Gonzalez  MRN: [id number]  : 1950    Chief Complaint   Patient presents with   • Diabetes       History of Present Illness:   Francisco Gonzalez is a 71 y.o. male who presents today for follow up on type 2 diabetes.  Diabetes was diagnosed in .  Known diabetic complications: none.  Current diabetic medications include Jardiance and metformin.  He reports tolerating medications okay.  He reports less frequent urination, nocturia.  This had been more bothersome after starting Jardiance.  Past diabetic medications include none.  He is testing BG once per day plus PRN.  He reports most readings in the 90s.  He did see a reading of 174 one day before lunch.  The lunchtime readings are often after exercise and before lunch.  Current diet: Avoids high carb/sweet foods, Avoids sugary drinks. In general, a \"healthy\" diet.    Current exercise: Golf, cardio and weightlifting at the gym.  Prior visit with diabetes educator/dietician: yes - 2021.  Feet: No sores or other issues.  Foot exam to date.  Eye exam current (within one year): yes, 2021. No retinopathy.  ACE inhibitor/ARB: Not Indicated.  Statin: Yes, atorvastatin.      Subjective      Review of Systems:   Review of Systems   Constitutional: Negative.    Cardiovascular: Negative.    Gastrointestinal: Negative.    Endocrine: Negative.        Past Medical History:   Diagnosis Date   • Anxiety    • Chicken pox    • Colonic polyp    • Essential hypertension    • Hydrocele     left; repaired   • Hyperlipidemia    • Measles    • Quadriceps tendon rupture    • Type 2 diabetes mellitus (HCC)       Past Surgical History:   Procedure Laterality Date   • APPENDECTOMY     • HYDROCELE EXCISION / REPAIR Left    • QUADRICEPS TENDON REPAIR      left quadracepts tendon rupture   • COLONOSCOPY  2022   • KNEE SURGERY      left knee   • VASECTOMY         The following portions of the " "patient's history were reviewed and updated as appropriate: allergies, current medications, past family history, past medical history, past social history, past surgical history and problem list.    Objective     Vitals:    06/28/22 0942   BP: 124/62   Pulse: 66   SpO2: 97%   Weight: 80.7 kg (178 lb)   Height: 182.9 cm (72\")   PainSc: 0-No pain   Body mass index is 24.14 kg/m².    Physical Exam  Vitals reviewed.   Constitutional:       General: He is not in acute distress.  Neurological:      Mental Status: He is alert and oriented to person, place, and time.   Psychiatric:         Mood and Affect: Affect normal.         HEMOGLOBIN A1C  Lab Results   Component Value Date    HGBA1C 7.0 06/28/2022    HGBA1C 6.9 03/16/2022    HGBA1C 6.9 12/06/2021     GLUCOSE  Lab Results   Component Value Date    POCGLU 255 (A) 06/28/2022     CMP  Lab Results   Component Value Date    GLUCOSE 131 (H) 01/05/2022    BUN 24 (H) 01/05/2022    CREATININE 1.19 01/05/2022    EGFRIFNONA 61 01/05/2022    BCR 20.2 01/05/2022     01/05/2022    K 4.6 01/05/2022    CO2 27.1 01/05/2022    CALCIUM 9.6 01/05/2022    ALBUMIN 4.40 01/05/2022    BILITOT 0.4 01/05/2022    ALKPHOS 74 01/05/2022    AST 17 01/05/2022    ALT 24 01/05/2022     LIPID PANEL  Lab Results   Component Value Date    CHOL 173 01/05/2022    TRIG 70 01/05/2022    HDL 70 (H) 01/05/2022    LDL 90 01/05/2022     URINE MICROALBUMIN/CREATININE RATIO  Lab Results   Component Value Date    MALBCRERATIO  01/05/2022      Comment:      Unable to calculate     THYROID  Lab Results   Component Value Date    TSH 1.420 02/02/2021       Current Outpatient Medications   Medication Instructions   • Accu-Chek FastClix Lancets misc TEST BLOOD GLUCOSE TWICE DAILY   • Accu-Chek Guide test strip TEST BLOOD GLUCOSE EVERY DAY. Dx E11.65   • alfuzosin (UROXATRAL) 10 mg, Oral, Daily   • ALPRAZolam (XANAX) 0.5 mg, Oral, Nightly PRN   • aspirin 81 mg, Oral, Daily   • atorvastatin (LIPITOR) 20 mg, Oral, " Daily   • Cream Base cream Hydrocortisone 2.5%-nitroglycerin 0.2% ointment: Apply a small amount to the appropriate area twice a day as needed   • cyclobenzaprine (FLEXERIL) 10 mg, Oral, 2 Times Daily PRN   • DiphenhydrAMINE HCl, Sleep, (ZZZQUIL PO) Nightly PRN   • empagliflozin (JARDIANCE) 25 mg, Oral, Daily   • fluticasone (Flonase) 50 MCG/ACT nasal spray 2 sprays, Nasal, Daily   • Hydrocortisone Acetate (PROCTOSOL RE) 1 application, Rectal, As Needed   • Hydrocortisone, Perianal, (ANUSOL-HC) 2.5 % rectal cream Rectal, 2 Times Daily   • magnesium oxide (MAGOX) 200 mg, Oral, Daily   • metFORMIN (GLUCOPHAGE) 1,000 mg, Oral, 2 Times Daily With Meals   • Multiple Vitamins-Minerals (MULTIVITAMIN ADULTS 50+ PO) 1 tablet, Oral, Daily   • ondansetron (ZOFRAN) 4 mg, Oral, Every 6 Hours   • tadalafil (CIALIS) 10 mg, Oral, Daily PRN       Assessment / Plan      Assessment & Plan:  1. Type 2 diabetes mellitus with hyperglycemia, without long-term current use of insulin ([facility])  A1c up slightly, but remains at goal.  Continue metformin and Jardiance.  He will continue to work on nutritious dietary choices, moderation of carbohydrates, regular exercise.  He would like to meet with dietician.  Will make referral.  We reviewed blood sugar goals:  Fasting/before meals <130, 2 hours after meals <180.  We discussed testing to see how meals are affecting blood sugar.  BP okay.  Routine labs up to date.  - POC Glycosylated Hemoglobin (Hb A1C)  - POC Glucose, Blood  - metFORMIN (GLUCOPHAGE) 1000 MG tablet; Take 1 tablet by mouth 2 (Two) Times a Day With Meals.  Dispense: 180 tablet; Refill: 3  - Ambulatory Referral to Diabetic Education        Return in about 3 months (around 9/28/2022) for - keep scheduled follow up with Dr. Jose Allen. He was advised to contact the office with any interval questions or concerns.    YAYA Malone  Endocrinology  06/28/2022

## 2022-07-06 DIAGNOSIS — E78.2 MIXED HYPERLIPIDEMIA: Primary | ICD-10-CM

## 2022-07-08 ENCOUNTER — LAB (OUTPATIENT)
Dept: LAB | Facility: HOSPITAL | Age: 72
End: 2022-07-08

## 2022-07-08 DIAGNOSIS — E78.2 MIXED HYPERLIPIDEMIA: ICD-10-CM

## 2022-07-08 LAB
ALBUMIN SERPL-MCNC: 3.8 G/DL (ref 3.5–5.2)
ALBUMIN/GLOB SERPL: 1.4 G/DL
ALP SERPL-CCNC: 63 U/L (ref 39–117)
ALT SERPL W P-5'-P-CCNC: 21 U/L (ref 1–41)
ANION GAP SERPL CALCULATED.3IONS-SCNC: 9.5 MMOL/L (ref 5–15)
AST SERPL-CCNC: 17 U/L (ref 1–40)
BILIRUB SERPL-MCNC: 0.4 MG/DL (ref 0–1.2)
BUN SERPL-MCNC: 20 MG/DL (ref 8–23)
BUN/CREAT SERPL: 18.9 (ref 7–25)
CALCIUM SPEC-SCNC: 9.2 MG/DL (ref 8.6–10.5)
CHLORIDE SERPL-SCNC: 105 MMOL/L (ref 98–107)
CHOLEST SERPL-MCNC: 144 MG/DL (ref 0–200)
CO2 SERPL-SCNC: 25.5 MMOL/L (ref 22–29)
CREAT SERPL-MCNC: 1.06 MG/DL (ref 0.76–1.27)
EGFRCR SERPLBLD CKD-EPI 2021: 76 ML/MIN/1.73
GLOBULIN UR ELPH-MCNC: 2.7 GM/DL
GLUCOSE SERPL-MCNC: 134 MG/DL (ref 65–99)
HDLC SERPL-MCNC: 60 MG/DL (ref 40–60)
LDLC SERPL CALC-MCNC: 70 MG/DL (ref 0–100)
LDLC/HDLC SERPL: 1.16 {RATIO}
POTASSIUM SERPL-SCNC: 4.8 MMOL/L (ref 3.5–5.2)
PROT SERPL-MCNC: 6.5 G/DL (ref 6–8.5)
SODIUM SERPL-SCNC: 140 MMOL/L (ref 136–145)
TRIGL SERPL-MCNC: 73 MG/DL (ref 0–150)
VLDLC SERPL-MCNC: 14 MG/DL (ref 5–40)

## 2022-07-08 PROCEDURE — 80053 COMPREHEN METABOLIC PANEL: CPT

## 2022-07-08 PROCEDURE — 80061 LIPID PANEL: CPT

## 2022-07-12 ENCOUNTER — OFFICE VISIT (OUTPATIENT)
Dept: INTERNAL MEDICINE | Facility: CLINIC | Age: 72
End: 2022-07-12

## 2022-07-12 VITALS
HEIGHT: 72 IN | SYSTOLIC BLOOD PRESSURE: 128 MMHG | DIASTOLIC BLOOD PRESSURE: 72 MMHG | RESPIRATION RATE: 16 BRPM | BODY MASS INDEX: 24.19 KG/M2 | HEART RATE: 66 BPM | WEIGHT: 178.6 LBS | OXYGEN SATURATION: 98 % | TEMPERATURE: 96.9 F

## 2022-07-12 DIAGNOSIS — J30.1 SEASONAL ALLERGIC RHINITIS DUE TO POLLEN: ICD-10-CM

## 2022-07-12 DIAGNOSIS — E78.2 MIXED HYPERLIPIDEMIA: Primary | Chronic | ICD-10-CM

## 2022-07-12 DIAGNOSIS — F41.1 ANXIETY STATE: ICD-10-CM

## 2022-07-12 DIAGNOSIS — E11.65 TYPE 2 DIABETES MELLITUS WITH HYPERGLYCEMIA, WITHOUT LONG-TERM CURRENT USE OF INSULIN: Chronic | ICD-10-CM

## 2022-07-12 PROBLEM — N41.0 ACUTE PROSTATITIS: Status: RESOLVED | Noted: 2019-04-01 | Resolved: 2022-07-12

## 2022-07-12 PROCEDURE — 99214 OFFICE O/P EST MOD 30 MIN: CPT | Performed by: INTERNAL MEDICINE

## 2022-07-12 NOTE — PROGRESS NOTES
[facility]     Francisco Gonzalez  1950   [id number]      Patient Care Team:  Newton Cruz MD as PCP - General (Internal Medicine)  Gisselle Izaguirre PA-C as Physician Assistant (Internal Medicine)  Honey Phillips PA as Physician Assistant (Endocrinology)    Chief Complaint::   Chief Complaint   Patient presents with   • Follow-up     6 mth follow up        HPI    The patient presents for follow-up of hyperlipidemia, allergic rhinitis, and anxiety. He continues to see endocrinology for diabetes and his A1c last month was 7.0 percent.    Diabetes  The patient reports he is meeting with a nutritionist on Thursday morning to see if he can improve his diet.    Hypertension  The patient's blood pressure is slightly elevated today. He states that he has never had an issue with his blood pressure.    Anxiety  The patient states he is good on prescriptions. He states he is good on alprazolam.    Skin lesion  The patient states that he had a red streak that came up under his arm, which he thought was some kind of infection, but it has resolved.      Patient Active Problem List   Diagnosis   • Mixed hyperlipidemia   • Asthma   • Sensorineural hearing loss (SNHL) of both ears   • Allergic rhinitis   • Type 2 diabetes mellitus with hyperglycemia, without long-term current use of insulin (HCC)   • Hypertrophy of prostate without urinary obstruction   • Anxiety state   • Annual physical exam        Past Medical History:   Diagnosis Date   • Anxiety    • Chicken pox    • Colonic polyp    • Essential hypertension    • Hydrocele 2001    left; repaired   • Hyperlipidemia    • Measles    • Quadriceps tendon rupture 2010   • Type 2 diabetes mellitus (HCC)        Past Surgical History:   Procedure Laterality Date   • APPENDECTOMY  1986   • COLONOSCOPY  02/2022   • HYDROCELE EXCISION / REPAIR Left 2001   • KNEE SURGERY      left knee   • QUADRICEPS TENDON REPAIR  2010    left quadracepts tendon  rupture   • VASECTOMY         Family History   Problem Relation Age of Onset   • Hypertension Mother    • Heart attack Father         MI   • Coronary artery disease Father 59        premature   • No Known Problems Sister    • Hyperlipidemia Brother        Social History     Socioeconomic History   • Marital status:    Tobacco Use   • Smoking status: Never Smoker   • Smokeless tobacco: Never Used   Vaping Use   • Vaping Use: Never used   Substance and Sexual Activity   • Alcohol use: Yes     Alcohol/week: 1.0 standard drink     Types: 1 Cans of beer per week   • Drug use: No   • Sexual activity: Yes     Partners: Female       No Known Allergies      Current Outpatient Medications:   •  Accu-Chek FastClix Lancets misc, TEST BLOOD GLUCOSE TWICE DAILY, Disp: 200 each, Rfl: 1  •  Accu-Chek Guide test strip, TEST BLOOD GLUCOSE EVERY DAY. Dx E11.65, Disp: 100 each, Rfl: 1  •  alfuzosin (UROXATRAL) 10 MG 24 hr tablet, Take 10 mg by mouth Daily., Disp: , Rfl:   •  ALPRAZolam (XANAX) 0.5 MG tablet, Take 1 tablet by mouth At Night As Needed for Anxiety or Sleep., Disp: 30 tablet, Rfl: 0  •  aspirin 81 MG tablet, Take 81 mg by mouth Daily., Disp: , Rfl:   •  atorvastatin (LIPITOR) 20 MG tablet, Take 1 tablet by mouth Daily., Disp: 90 tablet, Rfl: 1  •  Cream Base cream, Hydrocortisone 2.5%-nitroglycerin 0.2% ointment: Apply a small amount to the appropriate area twice a day as needed, Disp: 30 g, Rfl: 1  •  cyclobenzaprine (FLEXERIL) 10 MG tablet, Take 1 tablet by mouth 2 (Two) Times a Day As Needed for Muscle Spasms., Disp: 30 tablet, Rfl: 0  •  empagliflozin (Jardiance) 25 MG tablet tablet, Take 1 tablet by mouth Daily., Disp: 90 tablet, Rfl: 3  •  fluticasone (Flonase) 50 MCG/ACT nasal spray, 2 sprays into the nostril(s) as directed by provider Daily., Disp: 1 bottle, Rfl: 5  •  Hydrocortisone Acetate (PROCTOSOL RE), Insert 1 application into the rectum As Needed., Disp: , Rfl:   •  Hydrocortisone, Perianal,  "(ANUSOL-HC) 2.5 % rectal cream, Insert  into the rectum 2 (Two) Times a Day. (Patient taking differently: Insert  into the rectum 2 (Two) Times a Day As Needed.), Disp: 30 g, Rfl: 2  •  magnesium oxide (MAGOX) 400 (241.3 Mg) MG tablet tablet, Take 200 mg by mouth Daily., Disp: , Rfl:   •  metFORMIN (GLUCOPHAGE) 1000 MG tablet, Take 1 tablet by mouth 2 (Two) Times a Day With Meals., Disp: 180 tablet, Rfl: 3  •  Multiple Vitamins-Minerals (MULTIVITAMIN ADULTS 50+ PO), Take 1 tablet by mouth Daily., Disp: , Rfl:   •  ondansetron (ZOFRAN) 4 MG tablet, Take 1 tablet by mouth Every 6 (Six) Hours., Disp: 6 tablet, Rfl: 0  •  tadalafil (Cialis) 10 MG tablet, Take 1 tablet by mouth Daily As Needed for Erectile Dysfunction., Disp: 30 tablet, Rfl: 3    Review of Systems:  A review of systems was performed, and positive findings are noted in the HPI.    Vital Signs  Vitals:    07/12/22 1038 07/12/22 1055   BP: 144/68 128/72   Pulse: 66    Resp: 16    Temp: 96.9 °F (36.1 °C)    SpO2: 98%    Weight: 81 kg (178 lb 9.6 oz)    Height: 182.9 cm (72\")        Physical Exam  Vitals reviewed.   Constitutional:       Appearance: He is well-developed.   HENT:      Head: Normocephalic and atraumatic.   Cardiovascular:      Rate and Rhythm: Normal rate and regular rhythm.      Heart sounds: Normal heart sounds. No murmur heard.  Pulmonary:      Effort: Pulmonary effort is normal.      Breath sounds: Normal breath sounds.   Skin:     Findings: Lesion present.      Comments: Scattered seborrheic keratoses and benign freckling on the trunk.   Neurological:      Mental Status: He is alert and oriented to person, place, and time.          Procedures    ACE III MINI             Assessment/Plan:    Diagnoses and all orders for this visit:    1. Mixed hyperlipidemia (Primary)    2. Type 2 diabetes mellitus with hyperglycemia, without long-term current use of insulin (HCC)    3. Anxiety state    4. Seasonal allergic rhinitis due to pollen        1. " Hyperlipidemia  - Lipids are extremely well controlled on atorvastatin and a healthy diet. He continues to work very hard with diet, weight control, and fitness to control his metabolic parameters.    2. Diabetes  - Last A1c was 7.0 percent. He works very hard to try to get this down, but even with a very strict diet, a BMI of 24, and taking metformin and Jardiance, he has not been able to get it to where he wants it at 6.5 percent. However, 7.0 percent is quite good. I encouraged him to continue working on it.    3. Anxiety  - This appears to be in remission. He takes alprazolam very sparingly.    4. Allergic rhinitis  - This is controlled with fluticasone as needed.    He will follow up in 6 months.      Plan of care reviewed with patient at the conclusion of today's visit. Education was provided regarding diagnosis, management, and any prescribed or recommended OTC medications.Patient verbalizes understanding of and agreement with management plan.         Transcribed from ambient dictation for Newton Cruz MD by Maritza Dutton.  07/12/22   11:39 EDT    Patient verbalized consent to the visit recording.

## 2022-07-14 ENCOUNTER — OFFICE VISIT (OUTPATIENT)
Dept: DIABETES SERVICES | Facility: HOSPITAL | Age: 72
End: 2022-07-14

## 2022-07-14 RX ORDER — TADALAFIL 10 MG/1
10 TABLET ORAL DAILY PRN
Qty: 30 TABLET | Refills: 3 | Status: SHIPPED | OUTPATIENT
Start: 2022-07-14 | End: 2022-07-14 | Stop reason: SDUPTHER

## 2022-07-14 RX ORDER — TADALAFIL 10 MG/1
10 TABLET ORAL DAILY PRN
Qty: 30 TABLET | Refills: 3 | Status: SHIPPED | OUTPATIENT
Start: 2022-07-14

## 2022-07-14 NOTE — CONSULTS
Diabetes Education    Patient Name:  Francisco Gonzalez  YOB: 1950  MRN: [id number]  Admit Date:  (Not on file)        Courtesy visit, pt seen for full ed in 2021. Pt requested additional visit to review dietary questions. Problem focused discussion to address his concerns. Reviewed current dietary plan and encouraged pt on his progress. He is doing very well with healthy diet and lifestyle choices. No significant changes discussed, pt will try checking BG at different times of day and incorporating more snacks. Encouraged pt to call with additional questions/concerns.      Electronically signed by:  Ela Piper  07/14/22 13:37 EDT

## 2022-09-20 ENCOUNTER — OFFICE VISIT (OUTPATIENT)
Dept: ENDOCRINOLOGY | Facility: CLINIC | Age: 72
End: 2022-09-20

## 2022-09-20 VITALS
HEART RATE: 67 BPM | HEIGHT: 72 IN | BODY MASS INDEX: 24.08 KG/M2 | WEIGHT: 177.8 LBS | DIASTOLIC BLOOD PRESSURE: 58 MMHG | SYSTOLIC BLOOD PRESSURE: 118 MMHG | OXYGEN SATURATION: 97 %

## 2022-09-20 DIAGNOSIS — E11.65 TYPE 2 DIABETES MELLITUS WITH HYPERGLYCEMIA, WITHOUT LONG-TERM CURRENT USE OF INSULIN: Primary | ICD-10-CM

## 2022-09-20 DIAGNOSIS — E78.2 MIXED HYPERLIPIDEMIA: Chronic | ICD-10-CM

## 2022-09-20 LAB
EXPIRATION DATE: ABNORMAL
EXPIRATION DATE: NORMAL
GLUCOSE BLDC GLUCOMTR-MCNC: 236 MG/DL (ref 70–130)
HBA1C MFR BLD: 7.2 %
Lab: ABNORMAL
Lab: NORMAL

## 2022-09-20 PROCEDURE — 3051F HG A1C>EQUAL 7.0%<8.0%: CPT | Performed by: INTERNAL MEDICINE

## 2022-09-20 PROCEDURE — 82947 ASSAY GLUCOSE BLOOD QUANT: CPT | Performed by: INTERNAL MEDICINE

## 2022-09-20 PROCEDURE — 83036 HEMOGLOBIN GLYCOSYLATED A1C: CPT | Performed by: INTERNAL MEDICINE

## 2022-09-20 PROCEDURE — 99213 OFFICE O/P EST LOW 20 MIN: CPT | Performed by: INTERNAL MEDICINE

## 2022-09-20 RX ORDER — TADALAFIL 5 MG/1
TABLET ORAL AS NEEDED
COMMUNITY
Start: 2022-07-14 | End: 2022-09-20 | Stop reason: SDUPTHER

## 2022-09-20 NOTE — ASSESSMENT & PLAN NOTE
Diabetes is worsening.  A1c just above goal at 7.2%.  He thinks he can get this back down with some diet changes.  Continue current treatment regimen.  Work on reducing carbs on diet.  Diabetes will be reassessed in 3 months.

## 2022-09-20 NOTE — PROGRESS NOTES
"     Office Note      Date: 2022  Patient Name: Francisco Gonzalez  MRN: [id number]  : 1950    Chief Complaint   Patient presents with   • Diabetes       History of Present Illness:   Francisco Gonzalez is a 71 y.o. male who presents for Diabetes type 2. Diagnosed in: . Treated in past with oral agents. Current treatments: metformin and jardiance. Number of insulin shots per day: none. Checks blood sugar 1 time a day. Has low blood sugar: no. Aspirin use: Yes. Statin use: Yes. ACE-I/ARB use: No - no indication. Changes in health since last visit: none. Last eye exam 2021.    Subjective      Diabetic Complications:  Eyes: No  Kidneys: No  Feet: No  Heart: No    Diet and Exercise:  Meals per day: 3  Minutes of exercise per week: 270 mins.    Review of Systems:   Review of Systems   Constitutional: Negative.    Cardiovascular: Negative.    Gastrointestinal: Negative.    Endocrine: Negative.        The following portions of the patient's history were reviewed and updated as appropriate: allergies, current medications, past family history, past medical history, past social history, past surgical history and problem list.    Objective       Visit Vitals  /58   Pulse 67   Ht 182.9 cm (72\")   Wt 80.6 kg (177 lb 12.8 oz)   SpO2 97%   BMI 24.11 kg/m²       Physical Exam:  Physical Exam  Constitutional:       Appearance: Normal appearance.   Neurological:      Mental Status: He is alert.         Labs:    HbA1c  Lab Results   Component Value Date    HGBA1C 7.2 2022       CMP  Lab Results   Component Value Date    GLUCOSE 134 (H) 2022    BUN 20 2022    CREATININE 1.06 2022    EGFRIFNONA 61 2022    BCR 18.9 2022    K 4.8 2022    CO2 25.5 2022    CALCIUM 9.2 2022    AST 17 2022    ALT 21 2022        Lipid Panel  Lab Results   Component Value Date    HDL 60 2022    LDL 70 2022    TRIG 73 2022        TSH  Lab Results "   Component Value Date    TSH 1.420 02/02/2021        Hemoglobin A1C  Lab Results   Component Value Date    HGBA1C 7.2 09/20/2022        Microalbumin/Creatinine  Lab Results   Component Value Date    MALBCRERATIO  01/05/2022      Comment:      Unable to calculate    MICROALBUR <1.2 01/05/2022           Assessment / Plan      Assessment & Plan:  Diagnoses and all orders for this visit:    1. Type 2 diabetes mellitus with hyperglycemia, without long-term current use of insulin (HCC) (Primary)  Assessment & Plan:  Diabetes is worsening.  A1c just above goal at 7.2%.  He thinks he can get this back down with some diet changes.  Continue current treatment regimen.  Work on reducing carbs on diet.  Diabetes will be reassessed in 3 months.    Orders:  -     POC Glucose, Blood  -     POC Glycosylated Hemoglobin (Hb A1C)    2. Mixed hyperlipidemia  Assessment & Plan:  Continue statin.        Return in about 3 months (around 12/20/2022) for Recheck with A1c, CMP, lipids, TSH, microalbumin, foot exam.    Jose Allen MD   09/20/2022

## 2022-10-17 RX ORDER — ATORVASTATIN CALCIUM 20 MG/1
20 TABLET, FILM COATED ORAL DAILY
Qty: 90 TABLET | Refills: 1 | Status: SHIPPED | OUTPATIENT
Start: 2022-10-17

## 2022-10-26 ENCOUNTER — OFFICE VISIT (OUTPATIENT)
Dept: CARDIOLOGY | Facility: CLINIC | Age: 72
End: 2022-10-26

## 2022-10-26 VITALS
WEIGHT: 177 LBS | SYSTOLIC BLOOD PRESSURE: 118 MMHG | OXYGEN SATURATION: 97 % | DIASTOLIC BLOOD PRESSURE: 62 MMHG | HEART RATE: 80 BPM | BODY MASS INDEX: 23.98 KG/M2 | HEIGHT: 72 IN

## 2022-10-26 DIAGNOSIS — E78.2 MIXED HYPERLIPIDEMIA: ICD-10-CM

## 2022-10-26 DIAGNOSIS — R07.9 EXERTIONAL CHEST PAIN: Primary | ICD-10-CM

## 2022-10-26 DIAGNOSIS — I10 ESSENTIAL HYPERTENSION: ICD-10-CM

## 2022-10-26 PROCEDURE — 99213 OFFICE O/P EST LOW 20 MIN: CPT | Performed by: INTERNAL MEDICINE

## 2022-11-16 ENCOUNTER — OFFICE VISIT (OUTPATIENT)
Dept: INTERNAL MEDICINE | Facility: CLINIC | Age: 72
End: 2022-11-16

## 2022-11-16 VITALS
HEART RATE: 88 BPM | BODY MASS INDEX: 23.43 KG/M2 | WEIGHT: 173 LBS | TEMPERATURE: 99.8 F | SYSTOLIC BLOOD PRESSURE: 118 MMHG | DIASTOLIC BLOOD PRESSURE: 62 MMHG | HEIGHT: 72 IN

## 2022-11-16 DIAGNOSIS — R35.0 URINARY FREQUENCY: ICD-10-CM

## 2022-11-16 DIAGNOSIS — J10.1 INFLUENZA A: Primary | ICD-10-CM

## 2022-11-16 DIAGNOSIS — R50.9 FEVER, UNSPECIFIED FEVER CAUSE: ICD-10-CM

## 2022-11-16 LAB
BILIRUB BLD-MCNC: NEGATIVE MG/DL
CLARITY, POC: CLEAR
COLOR UR: ABNORMAL
EXPIRATION DATE: ABNORMAL
EXPIRATION DATE: ABNORMAL
FLUAV AG UPPER RESP QL IA.RAPID: DETECTED
FLUBV AG UPPER RESP QL IA.RAPID: NOT DETECTED
GLUCOSE UR STRIP-MCNC: ABNORMAL MG/DL
INTERNAL CONTROL: ABNORMAL
KETONES UR QL: ABNORMAL
LEUKOCYTE EST, POC: NEGATIVE
Lab: ABNORMAL
Lab: ABNORMAL
NITRITE UR-MCNC: NEGATIVE MG/ML
PH UR: 5 [PH] (ref 5–8)
PROT UR STRIP-MCNC: NEGATIVE MG/DL
RBC # UR STRIP: ABNORMAL /UL
SARS-COV-2 AG UPPER RESP QL IA.RAPID: NOT DETECTED
SP GR UR: 1.02 (ref 1–1.03)
UROBILINOGEN UR QL: ABNORMAL

## 2022-11-16 PROCEDURE — 81003 URINALYSIS AUTO W/O SCOPE: CPT | Performed by: NURSE PRACTITIONER

## 2022-11-16 PROCEDURE — 87428 SARSCOV & INF VIR A&B AG IA: CPT | Performed by: NURSE PRACTITIONER

## 2022-11-16 PROCEDURE — 99213 OFFICE O/P EST LOW 20 MIN: CPT | Performed by: NURSE PRACTITIONER

## 2022-11-16 PROCEDURE — 87086 URINE CULTURE/COLONY COUNT: CPT | Performed by: NURSE PRACTITIONER

## 2022-11-16 RX ORDER — OSELTAMIVIR PHOSPHATE 75 MG/1
75 CAPSULE ORAL 2 TIMES DAILY
Qty: 10 CAPSULE | Refills: 0 | Status: SHIPPED | OUTPATIENT
Start: 2022-11-16 | End: 2023-01-25

## 2022-11-16 RX ORDER — BALOXAVIR MARBOXIL 40 MG/1
80 TABLET, FILM COATED ORAL ONCE
Qty: 1 EACH | Refills: 0 | Status: SHIPPED | OUTPATIENT
Start: 2022-11-16 | End: 2022-11-16

## 2022-11-16 NOTE — PROGRESS NOTES
Francisco Gonzalez  1950  [id number]  Patient Care Team:  Newton Cruz MD as PCP - General (Internal Medicine)  Gisselle Izaguirre PA-C as Physician Assistant (Internal Medicine)  Honey Phillips PA as Physician Assistant (Endocrinology)  Lauren Ramos MD as Consulting Physician (Cardiology)    Francisco Gonzalez is a pleasant 72 y.o. male who presents for evaluation of Fever and Nasal Congestion    Chief Complaint   Patient presents with   • Fever   • Nasal Congestion       HPI:   The patient presents for evaluation of fever and cough.    The patient states that he has been feeling ill since last night, 11/15/2022. He states that he has been experiencing fever, body aches, and chills. He states that he worked out in the gym yesterday.    He states that he has been experiencing urinary frequency and even incontinence overnight. He states that he has been taking Jardiance for a while. He notes he will see Dr. Roca in a few week who will do a cystoscopy. He denies burning, or dark urine.   Past Medical History:   Diagnosis Date   • Anxiety    • Chicken pox    • Colonic polyp    • Essential hypertension    • Hydrocele 2001    left; repaired   • Hyperlipidemia    • Measles    • Multiple endocrine neoplasia (HCC)    • Quadriceps tendon rupture 2010   • Type 2 diabetes mellitus (HCC)      Past Surgical History:   Procedure Laterality Date   • APPENDECTOMY  1986   • COLONOSCOPY  02/2022   • HYDROCELE EXCISION / REPAIR Left 2001   • KNEE SURGERY      left knee   • QUADRICEPS TENDON REPAIR  2010    left quadracepts tendon rupture   • VASECTOMY       Family History   Problem Relation Age of Onset   • Hypertension Mother    • Heart attack Father         MI   • Coronary artery disease Father 59        premature   • No Known Problems Sister    • Hyperlipidemia Brother      Social History     Tobacco Use   Smoking Status Never   Smokeless Tobacco Never     No Known Allergies    Current Outpatient  Medications:   •  Accu-Chek FastClix Lancets misc, TEST BLOOD GLUCOSE TWICE DAILY, Disp: 200 each, Rfl: 1  •  Accu-Chek Guide test strip, TEST BLOOD GLUCOSE EVERY DAY. Dx E11.65, Disp: 100 each, Rfl: 1  •  alfuzosin (UROXATRAL) 10 MG 24 hr tablet, Take 10 mg by mouth Daily., Disp: , Rfl:   •  ALPRAZolam (XANAX) 0.5 MG tablet, Take 1 tablet by mouth At Night As Needed for Anxiety or Sleep., Disp: 30 tablet, Rfl: 0  •  aspirin 81 MG tablet, Take 81 mg by mouth Daily., Disp: , Rfl:   •  atorvastatin (LIPITOR) 20 MG tablet, TAKE 1 TABLET BY MOUTH DAILY, Disp: 90 tablet, Rfl: 1  •  Cream Base cream, Hydrocortisone 2.5%-nitroglycerin 0.2% ointment: Apply a small amount to the appropriate area twice a day as needed, Disp: 30 g, Rfl: 1  •  cyclobenzaprine (FLEXERIL) 10 MG tablet, Take 1 tablet by mouth 2 (Two) Times a Day As Needed for Muscle Spasms., Disp: 30 tablet, Rfl: 0  •  empagliflozin (Jardiance) 25 MG tablet tablet, Take 1 tablet by mouth Daily., Disp: 90 tablet, Rfl: 3  •  fluticasone (Flonase) 50 MCG/ACT nasal spray, 2 sprays into the nostril(s) as directed by provider Daily. (Patient taking differently: 2 sprays into the nostril(s) as directed by provider As Needed.), Disp: 1 bottle, Rfl: 5  •  Hydrocortisone Acetate (PROCTOSOL RE), Insert 1 application into the rectum As Needed., Disp: , Rfl:   •  Hydrocortisone, Perianal, (ANUSOL-HC) 2.5 % rectal cream, Insert  into the rectum 2 (Two) Times a Day. (Patient taking differently: Insert  into the rectum 2 (Two) Times a Day As Needed.), Disp: 30 g, Rfl: 2  •  magnesium oxide (MAGOX) 400 (241.3 Mg) MG tablet tablet, Take 200 mg by mouth Daily., Disp: , Rfl:   •  metFORMIN (GLUCOPHAGE) 1000 MG tablet, Take 1 tablet by mouth 2 (Two) Times a Day With Meals., Disp: 180 tablet, Rfl: 3  •  Mirabegron ER (MYRBETRIQ) 50 MG tablet sustained-release 24 hour 24 hr tablet, Take 50 mg by mouth Daily., Disp: , Rfl:   •  Multiple Vitamins-Minerals (MULTIVITAMIN ADULTS 50+ PO),  "Take 1 tablet by mouth Daily., Disp: , Rfl:   •  ondansetron (ZOFRAN) 4 MG tablet, Take 1 tablet by mouth Every 6 (Six) Hours., Disp: 6 tablet, Rfl: 0  •  oseltamivir (Tamiflu) 75 MG capsule, Take 1 capsule by mouth 2 (Two) Times a Day., Disp: 10 capsule, Rfl: 0  •  tadalafil (Cialis) 10 MG tablet, Take 1 tablet by mouth Daily As Needed for Erectile Dysfunction., Disp: 30 tablet, Rfl: 3    Review of Systems  Review of systems was completed, and pertinent findings are noted in the HPI.  /62 (BP Location: Left arm, Patient Position: Sitting, Cuff Size: Adult)   Pulse 88   Temp 99.8 °F (37.7 °C) (Temporal)   Ht 182.9 cm (72.01\")   Wt 78.5 kg (173 lb)   BMI 23.46 kg/m²     Physical Exam  Vitals reviewed.   Constitutional:       Appearance: He is well-developed.   Pulmonary:      Effort: Pulmonary effort is normal.   Neurological:      Mental Status: He is alert.   Psychiatric:         Mood and Affect: Mood normal.         Behavior: Behavior normal.         Thought Content: Thought content normal.         Judgment: Judgment normal.         Procedures    PHQ-9 Total Score:      Assessment/Plan:  Diagnoses and all orders for this visit:    1. Influenza A (Primary)    2. Fever, unspecified fever cause  -     POCT SARS-CoV-2 Antigen DENNIS    3. Urinary frequency  -     POC Urinalysis Dipstick, Automated  -     Urine Culture - Urine, Urine, Clean Catch; Future  -     Urine Culture - Urine, Urine, Clean Catch    Other orders  -     Baloxavir Marboxil,80 MG Dose, (Xofluza, 80 MG Dose,) 2 x 40 MG tablet therapy pack; Take 2 tablets by mouth 1 (One) Time for 1 dose.  Dispense: 1 each; Refill: 0    1. Influenza  The patient will start Xofluza. He can continue to use Tylenol for fever and chills.    2. Urinary urgency  We will obtain a urine sample today. We will call the patient with the results.   There are no Patient Instructions on file for this visit.  Plan of care reviewed with patient at the conclusion of today's " visit. Education was provided regarding diagnosis, management and any prescribed or recommended OTC medications.  Patient verbalizes understanding of and agreement with management plan.    Return if symptoms worsen or fail to improve.    Dictated Utilizing Dragon Dictation.    Transcribed from ambient dictation for SCOT Batista by Emily Bergman.  11/16/22   13:29 EST    Patient or patient representative verbalized consent to the visit recording.  I have personally performed the services described in this document as transcribed by the above individual, and it is both accurate and complete.

## 2022-11-18 LAB — BACTERIA SPEC AEROBE CULT: NO GROWTH

## 2022-12-01 DIAGNOSIS — E11.65 TYPE 2 DIABETES MELLITUS WITH HYPERGLYCEMIA, WITHOUT LONG-TERM CURRENT USE OF INSULIN: Chronic | ICD-10-CM

## 2022-12-02 RX ORDER — BLOOD SUGAR DIAGNOSTIC
STRIP MISCELLANEOUS
Qty: 100 EACH | Refills: 1 | Status: SHIPPED | OUTPATIENT
Start: 2022-12-02

## 2022-12-15 RX ORDER — HYDROCORTISONE 25 MG/G
CREAM TOPICAL 2 TIMES DAILY
Qty: 30 G | Refills: 2 | Status: SHIPPED | OUTPATIENT
Start: 2022-12-15

## 2022-12-15 NOTE — TELEPHONE ENCOUNTER
Rx Refill Note  Requested Prescriptions     Pending Prescriptions Disp Refills   • Hydrocortisone, Perianal, (ANUSOL-HC) 2.5 % rectal cream 30 g 2     Sig: Insert  into the rectum 2 (Two) Times a Day.      Last office visit with prescribing clinician: 7/12/2022   Last telemedicine visit with prescribing clinician: 1/25/2023   Next office visit with prescribing clinician: 1/25/2023                             Sonia Longo RN  12/15/22, 10:16 EST

## 2022-12-16 RX ORDER — EMOLLIENT BASE
CREAM (GRAM) TOPICAL
Qty: 30 G | Refills: 1 | Status: SHIPPED | OUTPATIENT
Start: 2022-12-16

## 2022-12-16 NOTE — TELEPHONE ENCOUNTER
. Senior Resident Addendum to Daily Progress Note    I saw and evaluated this patient independently of Dr. Pritchard. I agree with the history, exam, assessment, and plan detailed in their note, with the following additions:    Patient reports feeling well this morning and not noting left arm pain currently.  She reports that the pain is always localized to her upper arm on the lateral portion.  She feels that it is worse in the mornings and improves later in the afternoons.  It improved with an hot pack at home once.  Denies any new rashes, numbness, or tingling.  Does not think it is associated with exertion.  Vitals reviewed and hypertensive.  Labs significant for an improvement in her troponin from 0.14 to 0.13.    R/O cardiac etiology. Cardiology consulted, appreciate recommendations.  Patient has been started on aspirin. TTE with EF of 55%, unable to evaluate diastolic function.  Cardiology plans for stress test tomorrow.  Will make patient n.p.o. at midnight.  We will continue to monitor blood pressures closely and modify home regimen as needed.    Rest per daily progress note.     D/w Dr. Matthew Ramirez MD  Family Medicine, PGY-3  12/09/20   Rx Refill Note  Requested Prescriptions     Pending Prescriptions Disp Refills   • Cream Base cream 30 g 1     Sig: Hydrocortisone 2.5%-nitroglycerin 0.2% ointment: Apply a small amount to the appropriate area twice a day as needed      Last office visit with prescribing clinician: Visit date not found   Last telemedicine visit with prescribing clinician: 1/25/2023   Next office visit with prescribing clinician: Visit date not found                         Would you like a call back once the refill request has been completed: [] Yes [] No    If the office needs to give you a call back, can they leave a voicemail: [] Yes [] No    Tamika Michelle LPN  12/16/22, 09:28 EST

## 2022-12-19 DIAGNOSIS — R05.1 ACUTE COUGH: Primary | ICD-10-CM

## 2022-12-20 ENCOUNTER — HOSPITAL ENCOUNTER (OUTPATIENT)
Dept: GENERAL RADIOLOGY | Facility: HOSPITAL | Age: 72
Discharge: HOME | End: 2022-12-20
Admitting: INTERNAL MEDICINE

## 2022-12-20 DIAGNOSIS — R05.1 ACUTE COUGH: ICD-10-CM

## 2022-12-20 PROCEDURE — 71046 X-RAY EXAM CHEST 2 VIEWS: CPT

## 2023-01-10 ENCOUNTER — OFFICE VISIT (OUTPATIENT)
Dept: ENDOCRINOLOGY | Facility: CLINIC | Age: 73
End: 2023-01-10
Payer: MEDICARE

## 2023-01-10 VITALS
DIASTOLIC BLOOD PRESSURE: 70 MMHG | OXYGEN SATURATION: 98 % | HEIGHT: 72 IN | BODY MASS INDEX: 23.98 KG/M2 | HEART RATE: 84 BPM | SYSTOLIC BLOOD PRESSURE: 116 MMHG | WEIGHT: 177 LBS

## 2023-01-10 DIAGNOSIS — E78.2 MIXED HYPERLIPIDEMIA: Chronic | ICD-10-CM

## 2023-01-10 DIAGNOSIS — E11.65 TYPE 2 DIABETES MELLITUS WITH HYPERGLYCEMIA, WITHOUT LONG-TERM CURRENT USE OF INSULIN: Primary | ICD-10-CM

## 2023-01-10 LAB
EXPIRATION DATE: ABNORMAL
EXPIRATION DATE: NORMAL
GLUCOSE BLDC GLUCOMTR-MCNC: 174 MG/DL (ref 70–130)
HBA1C MFR BLD: 6.7 %
Lab: ABNORMAL
Lab: NORMAL

## 2023-01-10 PROCEDURE — 99214 OFFICE O/P EST MOD 30 MIN: CPT | Performed by: INTERNAL MEDICINE

## 2023-01-10 PROCEDURE — 3044F HG A1C LEVEL LT 7.0%: CPT | Performed by: INTERNAL MEDICINE

## 2023-01-10 PROCEDURE — 83036 HEMOGLOBIN GLYCOSYLATED A1C: CPT | Performed by: INTERNAL MEDICINE

## 2023-01-10 PROCEDURE — 82947 ASSAY GLUCOSE BLOOD QUANT: CPT | Performed by: INTERNAL MEDICINE

## 2023-01-10 NOTE — ASSESSMENT & PLAN NOTE
Diabetes is improving with treatment.   Continue current treatment regimen.  Diabetes will be reassessed in 3 months.

## 2023-01-10 NOTE — PROGRESS NOTES
Office Note      Date: 01/10/2023  Patient Name: Francisco Gonzalez  MRN: [id number]  : 1950    Chief Complaint   Patient presents with   • Diabetes       History of Present Illness:   Francisco Gonzalez is a 72 y.o. male who presents for Diabetes type 2. Diagnosed in: . Treated in past with oral agents. Current treatments: metformin and jardiance. Number of insulin shots per day: none. Checks blood sugar 1 time a day. Has low blood sugar: no. Aspirin use: Yes. Statin use: Yes. ACE-I/ARB use: No - no indication. Changes in health since last visit: none. Last eye exam 2022.    Subjective      Diabetic Complications:  Eyes: No  Kidneys: No  Feet: No  Heart: No    Diet and Exercise:  Meals per day: 3  Minutes of exercise per week: 270 mins.    Review of Systems:   Review of Systems   Constitutional: Negative.    Cardiovascular: Negative.    Gastrointestinal: Negative.    Endocrine: Negative.        The following portions of the patient's history were reviewed and updated as appropriate: allergies, current medications, past family history, past medical history, past social history, past surgical history and problem list.    Objective       Visit Vitals  /70   Pulse 84   Ht 182.9 cm (72\")   Wt 80.3 kg (177 lb)   SpO2 98%   BMI 24.01 kg/m²       Physical Exam:  Physical Exam  Constitutional:       Appearance: Normal appearance.   Cardiovascular:      Pulses:           Dorsalis pedis pulses are 2+ on the right side and 2+ on the left side.        Posterior tibial pulses are 2+ on the right side and 2+ on the left side.   Feet:      Right foot:      Protective Sensation: 5 sites tested. 5 sites sensed.      Skin integrity: Skin integrity normal.      Toenail Condition: Right toenails are abnormally thick.      Left foot:      Protective Sensation: 5 sites tested. 5 sites sensed.      Skin integrity: Skin integrity normal.      Toenail Condition: Left toenails are abnormally thick.   Neurological:       Mental Status: He is alert.         Labs:    HbA1c  Lab Results   Component Value Date    HGBA1C 6.7 01/10/2023       CMP  Lab Results   Component Value Date    GLUCOSE 134 (H) 07/08/2022    BUN 20 07/08/2022    CREATININE 1.06 07/08/2022    EGFRIFNONA 61 01/05/2022    BCR 18.9 07/08/2022    K 4.8 07/08/2022    CO2 25.5 07/08/2022    CALCIUM 9.2 07/08/2022    AST 17 07/08/2022    ALT 21 07/08/2022        Lipid Panel  Lab Results   Component Value Date    HDL 60 07/08/2022    LDL 70 07/08/2022    TRIG 73 07/08/2022        TSH  Lab Results   Component Value Date    TSH 1.420 02/02/2021        Hemoglobin A1C  Lab Results   Component Value Date    HGBA1C 6.7 01/10/2023        Microalbumin/Creatinine  Lab Results   Component Value Date    MALBCRERATIO  01/05/2022      Comment:      Unable to calculate    MICROALBUR <1.2 01/05/2022           Assessment / Plan      Assessment & Plan:  Diagnoses and all orders for this visit:    1. Type 2 diabetes mellitus with hyperglycemia, without long-term current use of insulin (HCC) (Primary)  Assessment & Plan:  Diabetes is improving with treatment.   Continue current treatment regimen.  Diabetes will be reassessed in 3 months.    Orders:  -     POC Glucose, Blood  -     POC Glycosylated Hemoglobin (Hb A1C)  -     TSH; Future  -     Microalbumin / Creatinine Urine Ratio - Urine, Clean Catch; Future    2. Mixed hyperlipidemia  Assessment & Plan:  Continue statin.  Lipids were okay 6 months ago.      Current Outpatient Medications   Medication Instructions   • Accu-Chek FastClix Lancets misc TEST BLOOD GLUCOSE TWICE DAILY   • Accu-Chek Guide test strip TEST BLOOD GLUCOSE EVERY DAY   • alfuzosin (UROXATRAL) 10 mg, Oral, Daily   • ALPRAZolam (XANAX) 0.5 mg, Oral, Nightly PRN   • aspirin 81 mg, Oral, Daily   • atorvastatin (LIPITOR) 20 mg, Oral, Daily   • Cream Base cream Hydrocortisone 2.5%-nitroglycerin 0.2% ointment: Apply a small amount to the appropriate area twice a day as  needed   • cyclobenzaprine (FLEXERIL) 10 mg, Oral, 2 Times Daily PRN   • empagliflozin (JARDIANCE) 25 mg, Oral, Daily   • fluticasone (Flonase) 50 MCG/ACT nasal spray 2 sprays, Nasal, Daily   • Hydrocortisone Acetate (PROCTOSOL RE) 1 application, Rectal, As Needed   • Hydrocortisone, Perianal, (ANUSOL-HC) 2.5 % rectal cream Rectal, 2 Times Daily   • metFORMIN (GLUCOPHAGE) 1,000 mg, Oral, 2 Times Daily With Meals   • Mirabegron ER (MYRBETRIQ) 50 MG tablet sustained-release 24 hour 24 hr tablet 1 tablet, Oral, Daily   • Multiple Vitamins-Minerals (MULTIVITAMIN ADULTS 50+ PO) 1 tablet, Oral, Daily   • ondansetron (ZOFRAN) 4 mg, Oral, Every 6 Hours   • oseltamivir (TAMIFLU) 75 mg, Oral, 2 Times Daily   • tadalafil (CIALIS) 10 mg, Oral, Daily PRN      Return in about 3 months (around 4/10/2023) for Recheck with A1c.    Jsoe Allen MD   01/10/2023

## 2023-01-19 DIAGNOSIS — R53.83 OTHER FATIGUE: ICD-10-CM

## 2023-01-19 DIAGNOSIS — E78.2 MIXED HYPERLIPIDEMIA: Primary | Chronic | ICD-10-CM

## 2023-01-20 ENCOUNTER — LAB (OUTPATIENT)
Dept: LAB | Facility: HOSPITAL | Age: 73
End: 2023-01-20
Payer: MEDICARE

## 2023-01-20 DIAGNOSIS — R53.83 OTHER FATIGUE: ICD-10-CM

## 2023-01-20 DIAGNOSIS — E78.2 MIXED HYPERLIPIDEMIA: Chronic | ICD-10-CM

## 2023-01-20 DIAGNOSIS — E11.65 TYPE 2 DIABETES MELLITUS WITH HYPERGLYCEMIA, WITHOUT LONG-TERM CURRENT USE OF INSULIN: ICD-10-CM

## 2023-01-20 LAB
ALBUMIN SERPL-MCNC: 4.4 G/DL (ref 3.5–5.2)
ALBUMIN UR-MCNC: <1.2 MG/DL
ALBUMIN/GLOB SERPL: 2.3 G/DL
ALP SERPL-CCNC: 65 U/L (ref 39–117)
ALT SERPL W P-5'-P-CCNC: 15 U/L (ref 1–41)
ANION GAP SERPL CALCULATED.3IONS-SCNC: 11.4 MMOL/L (ref 5–15)
AST SERPL-CCNC: 13 U/L (ref 1–40)
BASOPHILS # BLD AUTO: 0.08 10*3/MM3 (ref 0–0.2)
BASOPHILS NFR BLD AUTO: 1.5 % (ref 0–1.5)
BILIRUB SERPL-MCNC: 0.3 MG/DL (ref 0–1.2)
BUN SERPL-MCNC: 25 MG/DL (ref 8–23)
BUN/CREAT SERPL: 21.7 (ref 7–25)
CALCIUM SPEC-SCNC: 9.2 MG/DL (ref 8.6–10.5)
CHLORIDE SERPL-SCNC: 108 MMOL/L (ref 98–107)
CHOLEST SERPL-MCNC: 163 MG/DL (ref 0–200)
CO2 SERPL-SCNC: 22.6 MMOL/L (ref 22–29)
CREAT SERPL-MCNC: 1.15 MG/DL (ref 0.76–1.27)
CREAT UR-MCNC: 98.6 MG/DL
DEPRECATED RDW RBC AUTO: 41.6 FL (ref 37–54)
EGFRCR SERPLBLD CKD-EPI 2021: 68.5 ML/MIN/1.73
EOSINOPHIL # BLD AUTO: 0.72 10*3/MM3 (ref 0–0.4)
EOSINOPHIL NFR BLD AUTO: 13.2 % (ref 0.3–6.2)
ERYTHROCYTE [DISTWIDTH] IN BLOOD BY AUTOMATED COUNT: 12.6 % (ref 12.3–15.4)
GLOBULIN UR ELPH-MCNC: 1.9 GM/DL
GLUCOSE SERPL-MCNC: 123 MG/DL (ref 65–99)
HCT VFR BLD AUTO: 48.4 % (ref 37.5–51)
HDLC SERPL-MCNC: 68 MG/DL (ref 40–60)
HGB BLD-MCNC: 16.3 G/DL (ref 13–17.7)
IMM GRANULOCYTES # BLD AUTO: 0.01 10*3/MM3 (ref 0–0.05)
IMM GRANULOCYTES NFR BLD AUTO: 0.2 % (ref 0–0.5)
LDLC SERPL CALC-MCNC: 81 MG/DL (ref 0–100)
LDLC/HDLC SERPL: 1.19 {RATIO}
LYMPHOCYTES # BLD AUTO: 1.29 10*3/MM3 (ref 0.7–3.1)
LYMPHOCYTES NFR BLD AUTO: 23.6 % (ref 19.6–45.3)
MCH RBC QN AUTO: 30.5 PG (ref 26.6–33)
MCHC RBC AUTO-ENTMCNC: 33.7 G/DL (ref 31.5–35.7)
MCV RBC AUTO: 90.6 FL (ref 79–97)
MICROALBUMIN/CREAT UR: NORMAL MG/G{CREAT}
MONOCYTES # BLD AUTO: 0.57 10*3/MM3 (ref 0.1–0.9)
MONOCYTES NFR BLD AUTO: 10.4 % (ref 5–12)
NEUTROPHILS NFR BLD AUTO: 2.8 10*3/MM3 (ref 1.7–7)
NEUTROPHILS NFR BLD AUTO: 51.1 % (ref 42.7–76)
NRBC BLD AUTO-RTO: 0 /100 WBC (ref 0–0.2)
PLATELET # BLD AUTO: 230 10*3/MM3 (ref 140–450)
PMV BLD AUTO: 10.9 FL (ref 6–12)
POTASSIUM SERPL-SCNC: 4.4 MMOL/L (ref 3.5–5.2)
PROT SERPL-MCNC: 6.3 G/DL (ref 6–8.5)
RBC # BLD AUTO: 5.34 10*6/MM3 (ref 4.14–5.8)
SODIUM SERPL-SCNC: 142 MMOL/L (ref 136–145)
TRIGL SERPL-MCNC: 71 MG/DL (ref 0–150)
TSH SERPL DL<=0.05 MIU/L-ACNC: 1.67 UIU/ML (ref 0.27–4.2)
VLDLC SERPL-MCNC: 14 MG/DL (ref 5–40)
WBC NRBC COR # BLD: 5.47 10*3/MM3 (ref 3.4–10.8)

## 2023-01-20 PROCEDURE — 85025 COMPLETE CBC W/AUTO DIFF WBC: CPT

## 2023-01-20 PROCEDURE — 80061 LIPID PANEL: CPT

## 2023-01-20 PROCEDURE — 80053 COMPREHEN METABOLIC PANEL: CPT

## 2023-01-20 PROCEDURE — 84443 ASSAY THYROID STIM HORMONE: CPT

## 2023-01-20 PROCEDURE — 82570 ASSAY OF URINE CREATININE: CPT

## 2023-01-20 PROCEDURE — 82043 UR ALBUMIN QUANTITATIVE: CPT

## 2023-01-25 ENCOUNTER — OFFICE VISIT (OUTPATIENT)
Dept: INTERNAL MEDICINE | Facility: CLINIC | Age: 73
End: 2023-01-25
Payer: MEDICARE

## 2023-01-25 VITALS
HEIGHT: 72 IN | DIASTOLIC BLOOD PRESSURE: 80 MMHG | SYSTOLIC BLOOD PRESSURE: 122 MMHG | BODY MASS INDEX: 23.92 KG/M2 | TEMPERATURE: 98.7 F | HEART RATE: 80 BPM | WEIGHT: 176.6 LBS

## 2023-01-25 DIAGNOSIS — E11.65 TYPE 2 DIABETES MELLITUS WITH HYPERGLYCEMIA, WITHOUT LONG-TERM CURRENT USE OF INSULIN: Chronic | ICD-10-CM

## 2023-01-25 DIAGNOSIS — F41.1 ANXIETY STATE: ICD-10-CM

## 2023-01-25 DIAGNOSIS — E78.2 MIXED HYPERLIPIDEMIA: Chronic | ICD-10-CM

## 2023-01-25 DIAGNOSIS — Z00.00 ANNUAL PHYSICAL EXAM: Primary | ICD-10-CM

## 2023-01-25 DIAGNOSIS — D72.19 OTHER EOSINOPHILIA: ICD-10-CM

## 2023-01-25 PROCEDURE — 1160F RVW MEDS BY RX/DR IN RCRD: CPT | Performed by: INTERNAL MEDICINE

## 2023-01-25 PROCEDURE — 1126F AMNT PAIN NOTED NONE PRSNT: CPT | Performed by: INTERNAL MEDICINE

## 2023-01-25 PROCEDURE — G0439 PPPS, SUBSEQ VISIT: HCPCS | Performed by: INTERNAL MEDICINE

## 2023-01-25 PROCEDURE — 1159F MED LIST DOCD IN RCRD: CPT | Performed by: INTERNAL MEDICINE

## 2023-01-25 PROCEDURE — 1125F AMNT PAIN NOTED PAIN PRSNT: CPT | Performed by: INTERNAL MEDICINE

## 2023-01-25 PROCEDURE — 1170F FXNL STATUS ASSESSED: CPT | Performed by: INTERNAL MEDICINE

## 2023-01-25 RX ORDER — ALPRAZOLAM 0.5 MG/1
0.5 TABLET ORAL NIGHTLY PRN
Qty: 30 TABLET | Refills: 0 | Status: SHIPPED | OUTPATIENT
Start: 2023-01-25

## 2023-01-25 RX ORDER — ONDANSETRON 4 MG/1
4 TABLET, FILM COATED ORAL EVERY 8 HOURS PRN
Qty: 30 TABLET | Refills: 2 | Status: SHIPPED | OUTPATIENT
Start: 2023-01-25

## 2023-01-25 NOTE — PROGRESS NOTES
QUICK REFERENCE INFORMATION:  The ABCs of the Annual Wellness Visit    Subsequent Medicare Wellness Visit    HEALTH RISK ASSESSMENT    1950    Recent Hospitalizations:  No hospitalization(s) within the last year..        Current Medical Providers:  Patient Care Team:  Newton Cruz MD as PCP - General (Internal Medicine)  Gisselle Izaguirre PA-C as Physician Assistant (Internal Medicine)  Honey Phillips PA as Physician Assistant (Endocrinology)  Lauren Ramos MD as Consulting Physician (Cardiology)        Smoking Status:  Social History     Tobacco Use   Smoking Status Never   Smokeless Tobacco Never       Alcohol Consumption:  Social History     Substance and Sexual Activity   Alcohol Use Yes   • Alcohol/week: 1.0 standard drink   • Types: 1 Cans of beer per week       Depression Screen:   PHQ-2/PHQ-9 Depression Screening 1/25/2023   Retired PHQ-9 Total Score -   Retired Total Score -   Little Interest or Pleasure in Doing Things 0-->not at all   Feeling Down, Depressed or Hopeless 0-->not at all   PHQ-9: Brief Depression Severity Measure Score 0       Health Habits and Functional and Cognitive Screening:  Functional & Cognitive Status 1/25/2023   Do you have difficulty preparing food and eating? No   Do you have difficulty bathing yourself, getting dressed or grooming yourself? No   Do you have difficulty using the toilet? No   Do you have difficulty moving around from place to place? No   Do you have trouble with steps or getting out of a bed or a chair? No   Current Diet Well Balanced Diet   Dental Exam Up to date   Eye Exam Up to date   Exercise (times per week) 5 times per week   Current Exercises Include Light Weights;Aerobics        Exercise Comment golf   Current Exercise Activities Include -   Do you need help using the phone?  No   Are you deaf or do you have serious difficulty hearing?  No   Do you need help with transportation? No   Do you need help shopping? No   Do you  need help preparing meals?  No   Do you need help with housework?  No   Do you need help with laundry? No   Do you need help taking your medications? No   Do you need help managing money? No   Do you ever drive or ride in a car without wearing a seat belt? No   Have you felt unusual stress, anger or loneliness in the last month? No   Who do you live with? Spouse   If you need help, do you have trouble finding someone available to you? No   Have you been bothered in the last four weeks by sexual problems? No   Do you have difficulty concentrating, remembering or making decisions? No       Fall Risk Screen:  [facility] Fall Risk Assessment was completed, and patient is at LOW risk for falls.Assessment completed on:1/25/2023    ACE III MINI        Does the patient have evidence of cognitive impairment? No    Aspirin use counseling: Taking ASA appropriately as indicated    Recent Lab Results:  CMP:  Lab Results   Component Value Date    BUN 25 (H) 01/20/2023    CREATININE 1.15 01/20/2023    EGFRIFNONA 61 01/05/2022    BCR 21.7 01/20/2023     01/20/2023    K 4.4 01/20/2023    CO2 22.6 01/20/2023    CALCIUM 9.2 01/20/2023    ALBUMIN 4.4 01/20/2023    BILITOT 0.3 01/20/2023    ALKPHOS 65 01/20/2023    AST 13 01/20/2023    ALT 15 01/20/2023     HbA1c:  Lab Results   Component Value Date    HGBA1C 6.7 01/10/2023    HGBA1C 7.2 09/20/2022     Microalbumin:  Lab Results   Component Value Date    MICROALBUR <1.2 01/20/2023    POCMALB 10 mg/L 12/17/2019    POCCREAT 100 mg/dL 12/17/2019     Lipid Panel  Lab Results   Component Value Date    CHOL 163 01/20/2023    TRIG 71 01/20/2023    HDL 68 (H) 01/20/2023    LDL 81 01/20/2023    AST 13 01/20/2023    ALT 15 01/20/2023       Visual Acuity:  No results found.    Age-appropriate Screening Schedule:  Refer to the list below for future screening recommendations based on patient's age, sex and/or medical conditions. Orders for these recommended tests are listed in the plan section.  The patient has been provided with a written plan.    Health Maintenance   Topic Date Due   • DIABETIC FOOT EXAM  04/06/2023   • HEMOGLOBIN A1C  07/10/2023   • DIABETIC EYE EXAM  12/19/2023   • LIPID PANEL  01/20/2024   • URINE MICROALBUMIN  01/20/2024   • TDAP/TD VACCINES (3 - Td or Tdap) 05/08/2024   • INFLUENZA VACCINE  Completed   • ZOSTER VACCINE  Completed        Subjective   History of Present Illness    Francisco Gonzalez is a 72 y.o. male who presents for a Subsequent Wellness Visit.    The patient presents today for an annual examination.    Weight loss  The patient states he had a period of unexplained weight loss, which he attributes to Jardiance. He states he has been trying to diet better, but it fell off for about 4 months or so and then he stabilized. He states he has allergies and his eosinophils are normal. He states he does not have the inhalers.    Diabetes mellitus  The patient states he has had his eyes checked in 12/2022. He states he tried to get him to send a report to Dr. Gray.    Urinary urgency  The patient states he has an appointment with Dr. Zafar Roca on 03/03/2023 because he is struggling with urgency. He states he was going to do a cystoscope, but it broke. He states he was put on Myrbetriq, but it is so expensive and he is not sure if he is getting the benefit out of it. He states he would start taking a stool softener because he thinks it is drying him out. He states his stools are really hard in the morning. He states it does not make his mouth dry.    CHRONIC CONDITIONS : Diabetes mellitus    The following portions of the patient's history were reviewed and updated as appropriate: allergies, current medications, past family history, past medical history, past social history, past surgical history and problem list.    Outpatient Medications Prior to Visit   Medication Sig Dispense Refill   • Accu-Chek FastClix Lancets misc TEST BLOOD GLUCOSE TWICE DAILY 200 each 1   • Accu-Chek  Guide test strip TEST BLOOD GLUCOSE EVERY  each 1   • alfuzosin (UROXATRAL) 10 MG 24 hr tablet Take 10 mg by mouth Daily.     • aspirin 81 MG tablet Take 81 mg by mouth Daily.     • atorvastatin (LIPITOR) 20 MG tablet TAKE 1 TABLET BY MOUTH DAILY 90 tablet 1   • Cream Base cream Hydrocortisone 2.5%-nitroglycerin 0.2% ointment: Apply a small amount to the appropriate area twice a day as needed 30 g 1   • cyclobenzaprine (FLEXERIL) 10 MG tablet Take 1 tablet by mouth 2 (Two) Times a Day As Needed for Muscle Spasms. 30 tablet 0   • empagliflozin (Jardiance) 25 MG tablet tablet Take 1 tablet by mouth Daily. 90 tablet 3   • fluticasone (Flonase) 50 MCG/ACT nasal spray 2 sprays into the nostril(s) as directed by provider Daily. (Patient taking differently: 2 sprays into the nostril(s) as directed by provider As Needed.) 1 bottle 5   • Hydrocortisone, Perianal, (ANUSOL-HC) 2.5 % rectal cream Insert  into the rectum 2 (Two) Times a Day. (Patient taking differently: Insert 1 application into the rectum 2 (Two) Times a Day As Needed.) 30 g 2   • metFORMIN (GLUCOPHAGE) 1000 MG tablet Take 1 tablet by mouth 2 (Two) Times a Day With Meals. 180 tablet 3   • Mirabegron ER (MYRBETRIQ) 50 MG tablet sustained-release 24 hour 24 hr tablet Take 50 mg by mouth Daily.     • Multiple Vitamins-Minerals (MULTIVITAMIN ADULTS 50+ PO) Take 1 tablet by mouth Daily.     • tadalafil (Cialis) 10 MG tablet Take 1 tablet by mouth Daily As Needed for Erectile Dysfunction. 30 tablet 3   • ALPRAZolam (XANAX) 0.5 MG tablet Take 1 tablet by mouth At Night As Needed for Anxiety or Sleep. 30 tablet 0   • ondansetron (ZOFRAN) 4 MG tablet Take 1 tablet by mouth Every 6 (Six) Hours. (Patient taking differently: Take 4 mg by mouth Every 6 (Six) Hours As Needed.) 6 tablet 0   • Hydrocortisone Acetate (PROCTOSOL RE) Insert 1 application into the rectum As Needed.     • oseltamivir (Tamiflu) 75 MG capsule Take 1 capsule by mouth 2 (Two) Times a Day. 10  capsule 0     No facility-administered medications prior to visit.       Patient Active Problem List   Diagnosis   • Mixed hyperlipidemia   • Asthma   • Sensorineural hearing loss (SNHL) of both ears   • Allergic rhinitis   • Type 2 diabetes mellitus with hyperglycemia, without long-term current use of insulin (HCC)   • Hypertrophy of prostate without urinary obstruction   • Anxiety state   • Annual physical exam       Advance Care Planning:  ACP discussion was held with the patient during this visit. Patient has an advance directive in EMR which is still valid.     Identification of Risk Factors:  Risk factors include: Advance Directive Discussion.    Review of Systems  Review of systems is otherwise unremarkable.    Compared to one year ago, the patient feels his physical health is the same.  Compared to one year ago, the patient feels his mental health is the same.    Objective     Physical Exam  Vitals and nursing note reviewed.   Constitutional:       Appearance: He is well-developed.   HENT:      Head: Normocephalic and atraumatic.      Right Ear: External ear normal.      Left Ear: External ear normal.      Nose: Nose normal.      Mouth/Throat:      Pharynx: No oropharyngeal exudate.   Eyes:      Conjunctiva/sclera: Conjunctivae normal.      Pupils: Pupils are equal, round, and reactive to light.   Neck:      Thyroid: No thyromegaly.      Vascular: No JVD.   Cardiovascular:      Rate and Rhythm: Normal rate and regular rhythm.      Heart sounds: Normal heart sounds. No murmur heard.    No friction rub. No gallop.   Pulmonary:      Effort: Pulmonary effort is normal. No respiratory distress.      Breath sounds: Normal breath sounds. No wheezing or rales.   Chest:      Chest wall: No tenderness.   Abdominal:      General: Bowel sounds are normal. There is no distension.      Palpations: Abdomen is soft. There is no mass.      Tenderness: There is no abdominal tenderness. There is no guarding or rebound.       "Hernia: No hernia is present.   Musculoskeletal:         General: No tenderness. Normal range of motion.      Cervical back: Normal range of motion and neck supple.   Lymphadenopathy:      Cervical: No cervical adenopathy.   Skin:     General: Skin is warm and dry.      Findings: No erythema or rash.   Neurological:      Mental Status: He is alert and oriented to person, place, and time.      Cranial Nerves: No cranial nerve deficit.      Sensory: No sensory deficit.      Motor: No abnormal muscle tone.      Coordination: Coordination normal.      Deep Tendon Reflexes: Reflexes normal.   Psychiatric:         Behavior: Behavior normal.         Thought Content: Thought content normal.         Judgment: Judgment normal.        Physical examination is normal.    Procedures     Vitals:    01/25/23 1035   BP: 122/80   BP Location: Left arm   Patient Position: Sitting   Cuff Size: Adult   Pulse: 80   Temp: 98.7 °F (37.1 °C)   Weight: 80.1 kg (176 lb 9.6 oz)   Height: 182.5 cm (71.85\")   PainSc: 0-No pain       BMI is within normal parameters. No other follow-up for BMI required.      Assessment & Plan      1. Prevention  - Overall, he remains in excellent health with good lifestyle habits. He is up to date on colorectal cancer screening. We discussed Prevnar 20, which he will have done.    2. Diabetes mellitus  - This is now very well controlled. He will continue follow up with endocrinology. He is up to date on eye examination and foot examination.    3. Hyperlipidemia  - Lipids are well controlled on atorvastatin and healthy diet.    4. Anxiety  - This is generally not a problem, but he uses alprazolam infrequently.    5. Eosinophilia  - This has persisted from last year. He did lose weight at some point, but has absolutely no constitutional symptoms of any underlying disease and his weight has stabilized. We will discuss with hematology whether there is a need for further work-up.    Follow up in 1 year.  Problem List " Items Addressed This Visit        Cardiac and Vasculature    Mixed hyperlipidemia (Chronic)    Relevant Medications    atorvastatin (LIPITOR) 20 MG tablet       Endocrine and Metabolic    Type 2 diabetes mellitus with hyperglycemia, without long-term current use of insulin (HCC) (Chronic)    Relevant Medications    empagliflozin (Jardiance) 25 MG tablet tablet    metFORMIN (GLUCOPHAGE) 1000 MG tablet    Accu-Chek Guide test strip       Health Encounters    Annual physical exam - Primary       Mental Health    Anxiety state    Relevant Medications    ALPRAZolam (XANAX) 0.5 MG tablet   Other Visit Diagnoses     Other eosinophilia            Patient Self-Management and Personalized Health Advice  The patient has been provided with information about: diet and exercise and preventive services including:   · Annual Wellness Visit (AWV).    Outpatient Encounter Medications as of 1/25/2023   Medication Sig Dispense Refill   • Accu-Chek FastClix Lancets misc TEST BLOOD GLUCOSE TWICE DAILY 200 each 1   • Accu-Chek Guide test strip TEST BLOOD GLUCOSE EVERY  each 1   • alfuzosin (UROXATRAL) 10 MG 24 hr tablet Take 10 mg by mouth Daily.     • ALPRAZolam (XANAX) 0.5 MG tablet Take 1 tablet by mouth At Night As Needed for Anxiety or Sleep. 30 tablet 0   • aspirin 81 MG tablet Take 81 mg by mouth Daily.     • atorvastatin (LIPITOR) 20 MG tablet TAKE 1 TABLET BY MOUTH DAILY 90 tablet 1   • Cream Base cream Hydrocortisone 2.5%-nitroglycerin 0.2% ointment: Apply a small amount to the appropriate area twice a day as needed 30 g 1   • cyclobenzaprine (FLEXERIL) 10 MG tablet Take 1 tablet by mouth 2 (Two) Times a Day As Needed for Muscle Spasms. 30 tablet 0   • empagliflozin (Jardiance) 25 MG tablet tablet Take 1 tablet by mouth Daily. 90 tablet 3   • fluticasone (Flonase) 50 MCG/ACT nasal spray 2 sprays into the nostril(s) as directed by provider Daily. (Patient taking differently: 2 sprays into the nostril(s) as directed by  provider As Needed.) 1 bottle 5   • Hydrocortisone, Perianal, (ANUSOL-HC) 2.5 % rectal cream Insert  into the rectum 2 (Two) Times a Day. (Patient taking differently: Insert 1 application into the rectum 2 (Two) Times a Day As Needed.) 30 g 2   • metFORMIN (GLUCOPHAGE) 1000 MG tablet Take 1 tablet by mouth 2 (Two) Times a Day With Meals. 180 tablet 3   • Mirabegron ER (MYRBETRIQ) 50 MG tablet sustained-release 24 hour 24 hr tablet Take 50 mg by mouth Daily.     • Multiple Vitamins-Minerals (MULTIVITAMIN ADULTS 50+ PO) Take 1 tablet by mouth Daily.     • tadalafil (Cialis) 10 MG tablet Take 1 tablet by mouth Daily As Needed for Erectile Dysfunction. 30 tablet 3   • [DISCONTINUED] ALPRAZolam (XANAX) 0.5 MG tablet Take 1 tablet by mouth At Night As Needed for Anxiety or Sleep. 30 tablet 0   • [DISCONTINUED] ondansetron (ZOFRAN) 4 MG tablet Take 1 tablet by mouth Every 6 (Six) Hours. (Patient taking differently: Take 4 mg by mouth Every 6 (Six) Hours As Needed.) 6 tablet 0   • ondansetron (Zofran) 4 MG tablet Take 1 tablet by mouth Every 8 (Eight) Hours As Needed for Nausea or Vomiting. 30 tablet 2   • [DISCONTINUED] Hydrocortisone Acetate (PROCTOSOL RE) Insert 1 application into the rectum As Needed.     • [DISCONTINUED] oseltamivir (Tamiflu) 75 MG capsule Take 1 capsule by mouth 2 (Two) Times a Day. 10 capsule 0     No facility-administered encounter medications on file as of 1/25/2023.       Reviewed use of high risk medication in the elderly: yes  Reviewed for potential of harmful drug interactions in the elderly: yes    Follow Up:  Return in about 1 year (around 1/25/2024) for Medicare Wellness.     There are no Patient Instructions on file for this visit.    An After Visit Summary and PPPS with all of these plans were given to the patient.            Transcribed from ambient dictation for Newton Cruz MD by Emily Bergman.  01/25/23   13:14 EST    Patient or patient representative verbalized consent to the  visit recording.  I have personally performed the services described in this document as transcribed by the above individual, and it is both accurate and complete.

## 2023-02-24 ENCOUNTER — OFFICE VISIT (OUTPATIENT)
Dept: INTERNAL MEDICINE | Facility: CLINIC | Age: 73
End: 2023-02-24
Payer: MEDICARE

## 2023-02-24 ENCOUNTER — LAB (OUTPATIENT)
Dept: LAB | Facility: HOSPITAL | Age: 73
End: 2023-02-24
Payer: MEDICARE

## 2023-02-24 VITALS
HEIGHT: 72 IN | DIASTOLIC BLOOD PRESSURE: 58 MMHG | BODY MASS INDEX: 24.24 KG/M2 | OXYGEN SATURATION: 98 % | HEART RATE: 76 BPM | TEMPERATURE: 98 F | WEIGHT: 179 LBS | SYSTOLIC BLOOD PRESSURE: 118 MMHG

## 2023-02-24 DIAGNOSIS — R59.1 LYMPHADENOPATHY: Primary | ICD-10-CM

## 2023-02-24 DIAGNOSIS — R59.1 LYMPHADENOPATHY: ICD-10-CM

## 2023-02-24 LAB
BASOPHILS # BLD AUTO: 0.03 10*3/MM3 (ref 0–0.2)
BASOPHILS NFR BLD AUTO: 0.5 % (ref 0–1.5)
DEPRECATED RDW RBC AUTO: 43.2 FL (ref 37–54)
EOSINOPHIL # BLD AUTO: 0.3 10*3/MM3 (ref 0–0.4)
EOSINOPHIL NFR BLD AUTO: 5.2 % (ref 0.3–6.2)
ERYTHROCYTE [DISTWIDTH] IN BLOOD BY AUTOMATED COUNT: 12.7 % (ref 12.3–15.4)
HCT VFR BLD AUTO: 45.7 % (ref 37.5–51)
HGB BLD-MCNC: 15.3 G/DL (ref 13–17.7)
IMM GRANULOCYTES # BLD AUTO: 0.02 10*3/MM3 (ref 0–0.05)
IMM GRANULOCYTES NFR BLD AUTO: 0.3 % (ref 0–0.5)
LYMPHOCYTES # BLD AUTO: 1.11 10*3/MM3 (ref 0.7–3.1)
LYMPHOCYTES NFR BLD AUTO: 19.1 % (ref 19.6–45.3)
MCH RBC QN AUTO: 31.4 PG (ref 26.6–33)
MCHC RBC AUTO-ENTMCNC: 33.5 G/DL (ref 31.5–35.7)
MCV RBC AUTO: 93.6 FL (ref 79–97)
MONOCYTES # BLD AUTO: 0.52 10*3/MM3 (ref 0.1–0.9)
MONOCYTES NFR BLD AUTO: 9 % (ref 5–12)
NEUTROPHILS NFR BLD AUTO: 3.82 10*3/MM3 (ref 1.7–7)
NEUTROPHILS NFR BLD AUTO: 65.9 % (ref 42.7–76)
NRBC BLD AUTO-RTO: 0 /100 WBC (ref 0–0.2)
PLATELET # BLD AUTO: 217 10*3/MM3 (ref 140–450)
PMV BLD AUTO: 10.8 FL (ref 6–12)
RBC # BLD AUTO: 4.88 10*6/MM3 (ref 4.14–5.8)
WBC NRBC COR # BLD: 5.8 10*3/MM3 (ref 3.4–10.8)

## 2023-02-24 PROCEDURE — 85025 COMPLETE CBC W/AUTO DIFF WBC: CPT

## 2023-02-24 PROCEDURE — 99213 OFFICE O/P EST LOW 20 MIN: CPT | Performed by: PHYSICIAN ASSISTANT

## 2023-02-24 NOTE — ASSESSMENT & PLAN NOTE
Isolated inflamed tender lymph node noted along anterior cervical chain x2 days.  Continue to monitor.  We will check CBC for reassurance.  We will be able to use January labs for reference.  Patient instructed to monitor for additional symptoms.  Will consider addition of amoxicillin if warranted.  Otherwise, patient will call next week with update.

## 2023-02-24 NOTE — PROGRESS NOTES
[facility] Internal Medicine    Francisco Gonzalez  1950   [id number]      Patient Care Team:  Newton Cruz MD as PCP - General (Internal Medicine)  Gisselle Izaguirre PA-C as Physician Assistant (Internal Medicine)  Honey Phillips PA as Physician Assistant (Endocrinology)  Lauren Ramos MD as Consulting Physician (Cardiology)    Chief Complaint::   Chief Complaint   Patient presents with   • nodule on neck     Left side       HPI  Francisco Gonzalez is a 72-year-old male date of birth 1950 who presents today for nodule on neck. Noticed the lesion Wednesday afternoon after golfing. He has soreness along the left side of his neck.  He denies body aches or fever.  He denies head congestion, sore throat, ear pain, difficulty swallowing, rashes, or lesions.  Had full physical last month with Dr. Cruz CBC completed which did show elevation in eosinophils, but no other abnormalities noted.  Dentist appointment in December, x-rays were obtained and no abnormalities noted.      Patient Active Problem List   Diagnosis   • Mixed hyperlipidemia   • Asthma   • Sensorineural hearing loss (SNHL) of both ears   • Allergic rhinitis   • Type 2 diabetes mellitus with hyperglycemia, without long-term current use of insulin (HCC)   • Hypertrophy of prostate without urinary obstruction   • Anxiety state   • Annual physical exam   • Lymphadenopathy        Past Medical History:   Diagnosis Date   • Anxiety    • Chicken pox    • Colonic polyp    • Essential hypertension    • Hydrocele 2001    left; repaired   • Hyperlipidemia    • Measles    • Multiple endocrine neoplasia (HCC)    • Quadriceps tendon rupture 2010   • Type 2 diabetes mellitus (HCC)        Past Surgical History:   Procedure Laterality Date   • APPENDECTOMY  1986   • COLONOSCOPY  02/2022   • HYDROCELE EXCISION / REPAIR Left 2001   • KNEE SURGERY      left knee   • QUADRICEPS TENDON REPAIR  2010    left quadracepts tendon rupture   •  "VASECTOMY         Family History   Problem Relation Age of Onset   • Hypertension Mother    • Heart attack Father         MI   • Coronary artery disease Father 59        premature   • No Known Problems Sister    • Hyperlipidemia Brother        Social History     Socioeconomic History   • Marital status:    Tobacco Use   • Smoking status: Never   • Smokeless tobacco: Never   Vaping Use   • Vaping Use: Never used   Substance and Sexual Activity   • Alcohol use: Yes     Alcohol/week: 1.0 standard drink     Types: 1 Cans of beer per week   • Drug use: No   • Sexual activity: Yes     Partners: Female     Birth control/protection: Surgical       No Known Allergies    Review of Systems   Constitutional: Negative for activity change, appetite change and fever.   HENT: Negative for postnasal drip, sinus pressure, sore throat, swollen glands, tinnitus, trouble swallowing and voice change.    Musculoskeletal: Positive for neck pain.   Skin: Negative for color change, dry skin, skin lesions and wound.   Hematological: Positive for adenopathy.        Vital Signs  Vitals:    02/24/23 1427   BP: 118/58   BP Location: Right arm   Patient Position: Sitting   Cuff Size: Large Adult   Pulse: 76   Temp: 98 °F (36.7 °C)   TempSrc: Temporal   SpO2: 98%   Weight: 81.2 kg (179 lb)   Height: 182.5 cm (71.85\")   PainSc:   4     Body mass index is 24.38 kg/m².  BMI is within normal parameters. No other follow-up for BMI required.     Advance Care Planning   ACP discussion was held with the patient during this visit. Patient has an advance directive in EMR which is still valid.        Current Outpatient Medications:   •  Accu-Chek FastClix Lancets misc, TEST BLOOD GLUCOSE TWICE DAILY, Disp: 200 each, Rfl: 1  •  Accu-Chek Guide test strip, TEST BLOOD GLUCOSE EVERY DAY, Disp: 100 each, Rfl: 1  •  alfuzosin (UROXATRAL) 10 MG 24 hr tablet, Take 10 mg by mouth Daily., Disp: , Rfl:   •  ALPRAZolam (XANAX) 0.5 MG tablet, Take 1 tablet by mouth " At Night As Needed for Anxiety or Sleep., Disp: 30 tablet, Rfl: 0  •  aspirin 81 MG tablet, Take 81 mg by mouth Daily., Disp: , Rfl:   •  atorvastatin (LIPITOR) 20 MG tablet, TAKE 1 TABLET BY MOUTH DAILY, Disp: 90 tablet, Rfl: 1  •  Cream Base cream, Hydrocortisone 2.5%-nitroglycerin 0.2% ointment: Apply a small amount to the appropriate area twice a day as needed, Disp: 30 g, Rfl: 1  •  cyclobenzaprine (FLEXERIL) 10 MG tablet, Take 1 tablet by mouth 2 (Two) Times a Day As Needed for Muscle Spasms., Disp: 30 tablet, Rfl: 0  •  empagliflozin (Jardiance) 25 MG tablet tablet, Take 1 tablet by mouth Daily., Disp: 90 tablet, Rfl: 3  •  fluticasone (Flonase) 50 MCG/ACT nasal spray, 2 sprays into the nostril(s) as directed by provider Daily. (Patient taking differently: 2 sprays into the nostril(s) as directed by provider As Needed.), Disp: 1 bottle, Rfl: 5  •  Hydrocortisone, Perianal, (ANUSOL-HC) 2.5 % rectal cream, Insert  into the rectum 2 (Two) Times a Day. (Patient taking differently: Insert 1 application into the rectum 2 (Two) Times a Day As Needed.), Disp: 30 g, Rfl: 2  •  metFORMIN (GLUCOPHAGE) 1000 MG tablet, Take 1 tablet by mouth 2 (Two) Times a Day With Meals., Disp: 180 tablet, Rfl: 3  •  Mirabegron ER (MYRBETRIQ) 50 MG tablet sustained-release 24 hour 24 hr tablet, Take 50 mg by mouth Daily., Disp: , Rfl:   •  Multiple Vitamins-Minerals (MULTIVITAMIN ADULTS 50+ PO), Take 1 tablet by mouth Daily., Disp: , Rfl:   •  ondansetron (Zofran) 4 MG tablet, Take 1 tablet by mouth Every 8 (Eight) Hours As Needed for Nausea or Vomiting., Disp: 30 tablet, Rfl: 2  •  tadalafil (Cialis) 10 MG tablet, Take 1 tablet by mouth Daily As Needed for Erectile Dysfunction., Disp: 30 tablet, Rfl: 3    Physical Exam  Vitals reviewed.   Constitutional:       Appearance: Normal appearance.   HENT:      Head: Normocephalic and atraumatic.        Right Ear: Tympanic membrane, ear canal and external ear normal.      Left Ear: Tympanic  membrane, ear canal and external ear normal.      Nose: Nose normal.      Mouth/Throat:      Mouth: Mucous membranes are moist.      Pharynx: Oropharynx is clear.   Eyes:      Conjunctiva/sclera: Conjunctivae normal.      Pupils: Pupils are equal, round, and reactive to light.   Neck:     Lymphadenopathy:      Cervical: Cervical adenopathy present.   Neurological:      Mental Status: He is alert.          ACE III MINI            Results Review:    No results found for this or any previous visit (from the past 672 hour(s)).  Procedures    Medication Review: Medications reviewed and noted    Social History     Socioeconomic History   • Marital status:    Tobacco Use   • Smoking status: Never   • Smokeless tobacco: Never   Vaping Use   • Vaping Use: Never used   Substance and Sexual Activity   • Alcohol use: Yes     Alcohol/week: 1.0 standard drink     Types: 1 Cans of beer per week   • Drug use: No   • Sexual activity: Yes     Partners: Female     Birth control/protection: Surgical        Assessment/Plan:    Diagnoses and all orders for this visit:    1. Lymphadenopathy (Primary)  Assessment & Plan:  Isolated inflamed tender lymph node noted along anterior cervical chain x2 days.  Continue to monitor.  We will check CBC for reassurance.  We will be able to use January labs for reference.  Patient instructed to monitor for additional symptoms.  Will consider addition of amoxicillin if warranted.  Otherwise, patient will call next week with update.    Orders:  -     CBC & Differential; Future         Plan of care reviewed with patient at the conclusion of today's visit. Education was provided regarding diagnosis, management, and any prescribed or recommended OTC medications.Patient verbalizes understanding of and agreement with management plan.         I spent 15 minutes caring for Francisco on this date of service. This time includes time spent by me in the following activities:preparing for the visit, reviewing  tests, obtaining and/or reviewing a separately obtained history, performing a medically appropriate examination and/or evaluation , counseling and educating the patient/family/caregiver, ordering medications, tests, or procedures, referring and communicating with other health care professionals  and documenting information in the medical record    Cherie Calhoun PA-C      Note: Part of this note may be an electronic transcription/translation of spoken language to printed text using the Dragon Dictation system.

## 2023-03-22 ENCOUNTER — TELEPHONE (OUTPATIENT)
Dept: ENDOCRINOLOGY | Facility: CLINIC | Age: 73
End: 2023-03-22
Payer: MEDICARE

## 2023-03-22 RX ORDER — LANCETS
EACH MISCELLANEOUS
Qty: 204 EACH | Refills: 3 | Status: SHIPPED | OUTPATIENT
Start: 2023-03-22

## 2023-03-22 NOTE — TELEPHONE ENCOUNTER
----- Message from Francisco Gonzalez sent at 3/22/2023 10:46 AM EDT -----  Regarding: Accu-Chek FastClix lancets   Contact: [phone]  Dr Allen I have no refills left for the lancets.  [facility] [address]. Thanks

## 2023-04-13 DIAGNOSIS — E11.65 TYPE 2 DIABETES MELLITUS WITH HYPERGLYCEMIA, WITHOUT LONG-TERM CURRENT USE OF INSULIN: Chronic | ICD-10-CM

## 2023-04-13 RX ORDER — EMPAGLIFLOZIN 25 MG/1
TABLET, FILM COATED ORAL
Qty: 90 TABLET | Refills: 3 | Status: SHIPPED | OUTPATIENT
Start: 2023-04-13

## 2023-04-13 RX ORDER — ATORVASTATIN CALCIUM 20 MG/1
20 TABLET, FILM COATED ORAL DAILY
Qty: 90 TABLET | Refills: 1 | Status: SHIPPED | OUTPATIENT
Start: 2023-04-13

## 2023-05-03 ENCOUNTER — OFFICE VISIT (OUTPATIENT)
Dept: ENDOCRINOLOGY | Facility: CLINIC | Age: 73
End: 2023-05-03
Payer: MEDICARE

## 2023-05-03 VITALS
BODY MASS INDEX: 23.57 KG/M2 | DIASTOLIC BLOOD PRESSURE: 60 MMHG | HEIGHT: 72 IN | RESPIRATION RATE: 20 BRPM | SYSTOLIC BLOOD PRESSURE: 122 MMHG | HEART RATE: 97 BPM | OXYGEN SATURATION: 92 % | WEIGHT: 174 LBS

## 2023-05-03 DIAGNOSIS — E11.65 TYPE 2 DIABETES MELLITUS WITH HYPERGLYCEMIA, WITHOUT LONG-TERM CURRENT USE OF INSULIN: Primary | ICD-10-CM

## 2023-05-03 DIAGNOSIS — E78.2 MIXED HYPERLIPIDEMIA: Chronic | ICD-10-CM

## 2023-05-03 LAB
EXPIRATION DATE: ABNORMAL
EXPIRATION DATE: NORMAL
GLUCOSE BLDC GLUCOMTR-MCNC: 150 MG/DL (ref 70–130)
HBA1C MFR BLD: 6.7 %
Lab: ABNORMAL
Lab: NORMAL

## 2023-05-03 NOTE — PROGRESS NOTES
"     Office Note      Date: 2023  Patient Name: Francisco Gonzalez  MRN: [id number]  : 1950    Chief Complaint   Patient presents with   • Diabetes       History of Present Illness:   Francisco Gonzalez is a 72 y.o. male who presents for Diabetes type 2. Diagnosed in: . Treated in past with oral agents. Current treatments: metformin and jardiance. Number of insulin shots per day: none. Checks blood sugar 1 time a day. Has low blood sugar: no. Aspirin use: Yes. Statin use: Yes. ACE-I/ARB use: No - no indication. Changes in health since last visit: none. Last eye exam 2022.    Subjective      Diabetic Complications:  Eyes: No  Kidneys: No  Feet: No  Heart: No    Diet and Exercise:  Meals per day: 3  Minutes of exercise per week: 270 mins.    Review of Systems:   Review of Systems   Constitutional: Negative.    Cardiovascular: Negative.    Gastrointestinal: Negative.    Endocrine: Negative.        The following portions of the patient's history were reviewed and updated as appropriate: allergies, current medications, past family history, past medical history, past social history, past surgical history and problem list.    Objective       Visit Vitals  /60 (BP Location: Left arm, Patient Position: Sitting, Cuff Size: Adult)   Pulse 97   Resp 20   Ht 182.5 cm (71.85\")   Wt 78.9 kg (174 lb)   SpO2 92%   BMI 23.70 kg/m²       Physical Exam:  Physical Exam  Constitutional:       Appearance: Normal appearance.   Neurological:      Mental Status: He is alert.         Labs:    HbA1c  Lab Results   Component Value Date    HGBA1C 6.7 2023       CMP  Lab Results   Component Value Date    GLUCOSE 123 (H) 2023    BUN 25 (H) 2023    CREATININE 1.15 2023    EGFRIFNONA 61 2022    BCR 21.7 2023    K 4.4 2023    CO2 22.6 2023    CALCIUM 9.2 2023    AST 13 2023    ALT 15 2023        Lipid Panel  Lab Results   Component Value Date    HDL 68 (H) " 01/20/2023    LDL 81 01/20/2023    TRIG 71 01/20/2023        TSH  Lab Results   Component Value Date    TSH 1.670 01/20/2023        Hemoglobin A1C  Lab Results   Component Value Date    HGBA1C 6.7 05/03/2023        Microalbumin/Creatinine  Lab Results   Component Value Date    OLIVERIOO  01/20/2023      Comment:      Unable to calculate    MICROALBUR <1.2 01/20/2023           Assessment / Plan      Assessment & Plan:  Diagnoses and all orders for this visit:    1. Type 2 diabetes mellitus with hyperglycemia, without long-term current use of insulin (Primary)  Assessment & Plan:  Diabetes is unchanged.   Continue current treatment regimen.  Diabetes will be reassessed in 6 months.    Orders:  -     POC Glucose, Blood  -     POC Glycosylated Hemoglobin (Hb A1C)    2. Mixed hyperlipidemia  Assessment & Plan:  Continue statin.  Lipids at goal several months ago.      Current Outpatient Medications   Medication Instructions   • Accu-Chek FastClix Lancets misc USE AS DIRECTED TO TEST BLOOD GLUCOSE TWICE DAILY   • Accu-Chek Guide test strip TEST BLOOD GLUCOSE EVERY DAY   • alfuzosin (UROXATRAL) 10 mg, Oral, Daily   • ALPRAZolam (XANAX) 0.5 mg, Oral, Nightly PRN   • aspirin 81 mg, Oral, Daily   • atorvastatin (LIPITOR) 20 mg, Oral, Daily   • Cream Base cream Hydrocortisone 2.5%-nitroglycerin 0.2% ointment: Apply a small amount to the appropriate area twice a day as needed   • cyclobenzaprine (FLEXERIL) 10 mg, Oral, 2 Times Daily PRN   • Hydrocortisone, Perianal, (ANUSOL-HC) 2.5 % rectal cream Rectal, 2 Times Daily   • Jardiance 25 MG tablet tablet TAKE 1 TABLET BY MOUTH DAILY   • metFORMIN (GLUCOPHAGE) 1000 MG tablet TAKE 1 TABLET BY MOUTH TWICE DAILY WITH MEALS   • Mirabegron ER (MYRBETRIQ) 50 MG tablet sustained-release 24 hour 24 hr tablet 1 tablet, Oral, Daily   • Multiple Vitamins-Minerals (MULTIVITAMIN ADULTS 50+ PO) 1 tablet, Oral, Daily   • ondansetron (ZOFRAN) 4 mg, Oral, Every 8 Hours PRN   • tadalafil (CIALIS)  10 mg, Oral, Daily PRN      Return in about 6 months (around 11/3/2023) for Recheck with A1c.    Jose Allen MD   05/03/2023

## 2023-06-02 RX ORDER — LANCETS
EACH MISCELLANEOUS
Qty: 100 EACH | Refills: 3 | Status: SHIPPED | OUTPATIENT
Start: 2023-06-02

## 2023-06-07 DIAGNOSIS — E11.65 TYPE 2 DIABETES MELLITUS WITH HYPERGLYCEMIA, WITHOUT LONG-TERM CURRENT USE OF INSULIN: Chronic | ICD-10-CM

## 2023-06-07 RX ORDER — BLOOD SUGAR DIAGNOSTIC
STRIP MISCELLANEOUS
Qty: 100 EACH | Refills: 1 | Status: SHIPPED | OUTPATIENT
Start: 2023-06-07

## 2023-06-07 NOTE — TELEPHONE ENCOUNTER
Rx Refill Note    Requested Prescriptions     Pending Prescriptions Disp Refills    Accu-Chek Guide test strip [Pharmacy Med Name: ACCU-CHEK GUIDE TEST STRIPS 100S]       Sig: USE TO TEST BLOOD SUGAR AS DIRECTED EVERY DAY        Last office visit with prescribing clinician: 5-3-23      Next office visit with prescribing clinician: 11-3-23    {    Teresa Mclaughlin MA  06/07/23, 15:09 EDT

## 2023-07-27 ENCOUNTER — OFFICE VISIT (OUTPATIENT)
Dept: INTERNAL MEDICINE | Facility: CLINIC | Age: 73
End: 2023-07-27
Payer: MEDICARE

## 2023-07-27 ENCOUNTER — LAB (OUTPATIENT)
Dept: LAB | Facility: HOSPITAL | Age: 73
End: 2023-07-27
Payer: MEDICARE

## 2023-07-27 VITALS
BODY MASS INDEX: 23.49 KG/M2 | HEART RATE: 78 BPM | HEIGHT: 72 IN | WEIGHT: 173.4 LBS | OXYGEN SATURATION: 95 % | TEMPERATURE: 98 F | SYSTOLIC BLOOD PRESSURE: 114 MMHG | DIASTOLIC BLOOD PRESSURE: 60 MMHG

## 2023-07-27 DIAGNOSIS — R53.83 OTHER FATIGUE: ICD-10-CM

## 2023-07-27 DIAGNOSIS — E11.65 TYPE 2 DIABETES MELLITUS WITH HYPERGLYCEMIA, WITHOUT LONG-TERM CURRENT USE OF INSULIN: Chronic | ICD-10-CM

## 2023-07-27 DIAGNOSIS — E78.2 MIXED HYPERLIPIDEMIA: Chronic | ICD-10-CM

## 2023-07-27 DIAGNOSIS — R11.0 NAUSEA: Primary | ICD-10-CM

## 2023-07-27 DIAGNOSIS — R11.0 NAUSEA: ICD-10-CM

## 2023-07-27 LAB
ALBUMIN SERPL-MCNC: 3.6 G/DL (ref 3.5–5.2)
ALBUMIN/GLOB SERPL: 1.6 G/DL
ALP SERPL-CCNC: 59 U/L (ref 39–117)
ALT SERPL W P-5'-P-CCNC: 18 U/L (ref 1–41)
ANION GAP SERPL CALCULATED.3IONS-SCNC: 12.6 MMOL/L (ref 5–15)
AST SERPL-CCNC: 17 U/L (ref 1–40)
BASOPHILS # BLD AUTO: 0.03 10*3/MM3 (ref 0–0.2)
BASOPHILS NFR BLD AUTO: 0.7 % (ref 0–1.5)
BILIRUB SERPL-MCNC: <0.2 MG/DL (ref 0–1.2)
BUN SERPL-MCNC: 28 MG/DL (ref 8–23)
BUN/CREAT SERPL: 23.5 (ref 7–25)
CALCIUM SPEC-SCNC: 9 MG/DL (ref 8.6–10.5)
CHLORIDE SERPL-SCNC: 102 MMOL/L (ref 98–107)
CO2 SERPL-SCNC: 23.4 MMOL/L (ref 22–29)
CREAT SERPL-MCNC: 1.19 MG/DL (ref 0.76–1.27)
DEPRECATED RDW RBC AUTO: 40.8 FL (ref 37–54)
EGFRCR SERPLBLD CKD-EPI 2021: 65.7 ML/MIN/1.73
EOSINOPHIL # BLD AUTO: 0.19 10*3/MM3 (ref 0–0.4)
EOSINOPHIL NFR BLD AUTO: 4.1 % (ref 0.3–6.2)
ERYTHROCYTE [DISTWIDTH] IN BLOOD BY AUTOMATED COUNT: 12.5 % (ref 12.3–15.4)
GLOBULIN UR ELPH-MCNC: 2.3 GM/DL
GLUCOSE SERPL-MCNC: 147 MG/DL (ref 65–99)
HBA1C MFR BLD: 7.1 % (ref 4.8–5.6)
HCT VFR BLD AUTO: 45.7 % (ref 37.5–51)
HGB BLD-MCNC: 15.7 G/DL (ref 13–17.7)
IMM GRANULOCYTES # BLD AUTO: 0.01 10*3/MM3 (ref 0–0.05)
IMM GRANULOCYTES NFR BLD AUTO: 0.2 % (ref 0–0.5)
LYMPHOCYTES # BLD AUTO: 0.57 10*3/MM3 (ref 0.7–3.1)
LYMPHOCYTES NFR BLD AUTO: 12.4 % (ref 19.6–45.3)
MCH RBC QN AUTO: 31 PG (ref 26.6–33)
MCHC RBC AUTO-ENTMCNC: 34.4 G/DL (ref 31.5–35.7)
MCV RBC AUTO: 90.1 FL (ref 79–97)
MONOCYTES # BLD AUTO: 0.62 10*3/MM3 (ref 0.1–0.9)
MONOCYTES NFR BLD AUTO: 13.5 % (ref 5–12)
NEUTROPHILS NFR BLD AUTO: 3.18 10*3/MM3 (ref 1.7–7)
NEUTROPHILS NFR BLD AUTO: 69.1 % (ref 42.7–76)
NRBC BLD AUTO-RTO: 0 /100 WBC (ref 0–0.2)
PLATELET # BLD AUTO: 205 10*3/MM3 (ref 140–450)
PMV BLD AUTO: 10.8 FL (ref 6–12)
POTASSIUM SERPL-SCNC: 4.3 MMOL/L (ref 3.5–5.2)
PROT SERPL-MCNC: 5.9 G/DL (ref 6–8.5)
RBC # BLD AUTO: 5.07 10*6/MM3 (ref 4.14–5.8)
SODIUM SERPL-SCNC: 138 MMOL/L (ref 136–145)
WBC NRBC COR # BLD: 4.6 10*3/MM3 (ref 3.4–10.8)

## 2023-07-27 PROCEDURE — 85025 COMPLETE CBC W/AUTO DIFF WBC: CPT

## 2023-07-27 PROCEDURE — 83036 HEMOGLOBIN GLYCOSYLATED A1C: CPT

## 2023-07-27 PROCEDURE — 80053 COMPREHEN METABOLIC PANEL: CPT

## 2023-07-27 RX ORDER — ONDANSETRON 4 MG/1
4 TABLET, FILM COATED ORAL EVERY 8 HOURS PRN
Qty: 30 TABLET | Refills: 2 | Status: SHIPPED | OUTPATIENT
Start: 2023-07-27

## 2023-07-27 NOTE — PROGRESS NOTES
Acute Office Visit      Name: Francisco Gonzalez    : 1950     MRN: [id number]   Care Team: Patient Care Team:  Newton Cruz MD as PCP - General (Internal Medicine)  Gisselle Izaguirre PA-C as Physician Assistant (Internal Medicine)  Honey Phillips PA as Physician Assistant (Endocrinology)  Lauren Ramos MD as Consulting Physician (Cardiology)    Chief Complaint  Fever (Pt had a fever of 100 yesterday) and Nausea (Pt states his symptoms have improved since yesterday)    Subjective     History of Present Illness:  Francisco Gonzalez is a 72 y.o. male who presents today for fever, nausea, and fatigue.    Francisco states that within the last 2 days he experienced a fever of 100.0, nausea and decreased appetite, and overall fatigue.  He had not been in contact with anyone that was sick to his knowledge.  He rested, push oral fluids, and eat small frequent meals throughout the day and since yesterday, his symptoms have improved.    Francisco is preparing to go on vacation and was just concerned that he might have something contagious.  He did take a home COVID test yesterday which was negative.    Review of systems was completed, and pertinent findings are noted in the HPI.  Review of Systems   Constitutional:  Positive for fatigue and fever.   Gastrointestinal:  Positive for nausea.   All other systems reviewed and are negative.    Past Medical History:   Diagnosis Date    Anxiety     Chicken pox     Colonic polyp     Essential hypertension     Hydrocele     left; repaired    Hyperlipidemia     Measles     Multiple endocrine neoplasia     Quadriceps tendon rupture     Type 2 diabetes mellitus        Past Surgical History:   Procedure Laterality Date    APPENDECTOMY      COLONOSCOPY  2022    HYDROCELE EXCISION / REPAIR Left     KNEE SURGERY      left knee    QUADRICEPS TENDON REPAIR      left quadracepts tendon rupture    VASECTOMY         Social History      Socioeconomic History    Marital status:    Tobacco Use    Smoking status: Never    Smokeless tobacco: Never   Vaping Use    Vaping Use: Never used   Substance and Sexual Activity    Alcohol use: Yes     Alcohol/week: 1.0 standard drink     Types: 1 Cans of beer per week    Drug use: No    Sexual activity: Yes     Partners: Female     Birth control/protection: Surgical         Current Outpatient Medications:     Accu-Chek FastClix Lancets misc, Use to check blood sugar daily dx e11.65, Disp: 100 each, Rfl: 3    Accu-Chek Guide test strip, USE TO TEST BLOOD SUGAR AS DIRECTED EVERY DAY, Disp: 100 each, Rfl: 1    alfuzosin (UROXATRAL) 10 MG 24 hr tablet, Take 1 tablet by mouth Daily., Disp: , Rfl:     ALPRAZolam (XANAX) 0.5 MG tablet, Take 1 tablet by mouth At Night As Needed for Anxiety or Sleep., Disp: 30 tablet, Rfl: 0    aspirin 81 MG tablet, Take 1 tablet by mouth Daily., Disp: , Rfl:     atorvastatin (LIPITOR) 20 MG tablet, TAKE 1 TABLET BY MOUTH DAILY, Disp: 90 tablet, Rfl: 1    Cream Base cream, Hydrocortisone 2.5%-nitroglycerin 0.2% ointment: Apply a small amount to the appropriate area twice a day as needed, Disp: 30 g, Rfl: 1    cyclobenzaprine (FLEXERIL) 10 MG tablet, Take 1 tablet by mouth 2 (Two) Times a Day As Needed for Muscle Spasms., Disp: 30 tablet, Rfl: 0    Hydrocortisone, Perianal, (ANUSOL-HC) 2.5 % rectal cream, Insert  into the rectum 2 (Two) Times a Day. (Patient taking differently: Insert 1 application  into the rectum 2 (Two) Times a Day As Needed.), Disp: 30 g, Rfl: 2    Jardiance 25 MG tablet tablet, TAKE 1 TABLET BY MOUTH DAILY, Disp: 90 tablet, Rfl: 3    metFORMIN (GLUCOPHAGE) 1000 MG tablet, TAKE 1 TABLET BY MOUTH TWICE DAILY WITH MEALS, Disp: 180 tablet, Rfl: 3    Mirabegron ER (MYRBETRIQ) 50 MG tablet sustained-release 24 hour 24 hr tablet, Take 50 mg by mouth Daily., Disp: , Rfl:     Multiple Vitamins-Minerals (MULTIVITAMIN ADULTS 50+ PO), Take 1 tablet by mouth Daily.,  "Disp: , Rfl:     ondansetron (Zofran) 4 MG tablet, Take 1 tablet by mouth Every 8 (Eight) Hours As Needed for Nausea or Vomiting., Disp: 30 tablet, Rfl: 2    tadalafil (Cialis) 10 MG tablet, Take 1 tablet by mouth Daily As Needed for Erectile Dysfunction., Disp: 30 tablet, Rfl: 3    Procedures    PHQ-9 Total Score:      Objective     Vital Signs  /60   Pulse 78   Temp 98 °F (36.7 °C) (Infrared)   Ht 182.5 cm (71.85\")   Wt 78.7 kg (173 lb 6.4 oz)   SpO2 95%   BMI 23.62 kg/m²   Estimated body mass index is 23.62 kg/m² as calculated from the following:    Height as of this encounter: 182.5 cm (71.85\").    Weight as of this encounter: 78.7 kg (173 lb 6.4 oz).    BMI is within normal parameters. No other follow-up for BMI required.      Physical Exam  Vitals and nursing note reviewed.   HENT:      Head: Normocephalic.   Cardiovascular:      Rate and Rhythm: Normal rate.   Pulmonary:      Effort: Pulmonary effort is normal.      Breath sounds: Normal breath sounds.   Skin:     General: Skin is warm and dry.   Neurological:      General: No focal deficit present.      Mental Status: He is alert and oriented to person, place, and time.   Psychiatric:         Mood and Affect: Mood normal.         Behavior: Behavior normal.         Thought Content: Thought content normal.         Judgment: Judgment normal.        @[facility]@    Assessment and Plan     Assessment/Plan:  Diagnoses and all orders for this visit:    1. Nausea (Primary)  Comments:  Zofran 4 mg 3 times daily as needed.  Orders:  -     CBC & Differential; Future  -     Comprehensive Metabolic Panel; Future  -     ondansetron (Zofran) 4 MG tablet; Take 1 tablet by mouth Every 8 (Eight) Hours As Needed for Nausea or Vomiting.  Dispense: 30 tablet; Refill: 2  -Will evaluate labs.  -Encouraged to continue to push oral fluids and small frequent meals throughout the day of bland foods.  -No need to test for flu/COVID/strep due to symptoms improving.    2. Other " fatigue  Comments:  Labs pending.  Orders:  -     CBC & Differential; Future  -     Comprehensive Metabolic Panel; Future    3. Mixed hyperlipidemia  Comments:  Continue atorvastatin 20 mg daily.  Orders:  -     Lipid Panel; Future    4. Type 2 diabetes mellitus with hyperglycemia, without long-term current use of insulin  Comments:  Continue metformin 1000 mg twice daily.  Orders:  -     Hemoglobin A1c; Future         There are no Patient Instructions on file for this visit.  Plan of care reviewed with patient at the conclusion of today's visit. Education was provided regarding diagnosis, management and any prescribed or recommended OTC medications.  Patient verbalizes understanding of and agreement with management plan.    Follow Up  Return for Next Scheduled Follow up.    Mariaa Quan, APRN

## 2023-08-04 RX ORDER — ATORVASTATIN CALCIUM 20 MG/1
20 TABLET, FILM COATED ORAL DAILY
Qty: 7 TABLET | Refills: 0 | Status: SHIPPED | OUTPATIENT
Start: 2023-08-04

## 2023-08-15 ENCOUNTER — PATIENT MESSAGE (OUTPATIENT)
Dept: INTERNAL MEDICINE | Facility: CLINIC | Age: 73
End: 2023-08-15
Payer: MEDICARE

## 2023-08-15 RX ORDER — CYCLOBENZAPRINE HCL 10 MG
10 TABLET ORAL 2 TIMES DAILY PRN
Qty: 30 TABLET | Refills: 3 | Status: SHIPPED | OUTPATIENT
Start: 2023-08-15

## 2023-08-15 NOTE — TELEPHONE ENCOUNTER
From: Francisco Gonzalez  To: Newton Ge[place]  Sent: 8/15/2023 9:49 AM EDT  Subject: Cyclobenzaprine    I need a script for the above please. I pledge to eliminate squats from my leg day in the gym. My last script was 1/26/22. Tin on [facility].  Thanks

## 2023-08-16 ENCOUNTER — PRIOR AUTHORIZATION (OUTPATIENT)
Dept: INTERNAL MEDICINE | Facility: CLINIC | Age: 73
End: 2023-08-16
Payer: MEDICARE

## 2023-10-17 RX ORDER — ATORVASTATIN CALCIUM 20 MG/1
20 TABLET, FILM COATED ORAL DAILY
Qty: 90 TABLET | Refills: 1 | Status: SHIPPED | OUTPATIENT
Start: 2023-10-17

## 2023-10-31 ENCOUNTER — SPECIALTY PHARMACY (OUTPATIENT)
Dept: ENDOCRINOLOGY | Facility: CLINIC | Age: 73
End: 2023-10-31
Payer: MEDICARE

## 2023-10-31 NOTE — PROGRESS NOTES
" Specialty Pharmacy Patient Management Program  Endocrinology Benefits Investigation      Francisco \"Babak\" is seen by a [facility] Endocrinology provider and is currently taking a target specialty medication.  The patient IS ELIGIBLE for enrollment in the Endocrinology Patient Management Program offered by [facility].     Insurance: [employer]  Medication(s) and Costs:     Jardiance 30 Days, $31.17, 90 Days, $93.47      "

## 2023-11-01 ENCOUNTER — OFFICE VISIT (OUTPATIENT)
Dept: CARDIOLOGY | Facility: CLINIC | Age: 73
End: 2023-11-01
Payer: MEDICARE

## 2023-11-01 VITALS
DIASTOLIC BLOOD PRESSURE: 60 MMHG | SYSTOLIC BLOOD PRESSURE: 120 MMHG | OXYGEN SATURATION: 95 % | HEIGHT: 72 IN | BODY MASS INDEX: 23.86 KG/M2 | HEART RATE: 81 BPM | WEIGHT: 176.2 LBS

## 2023-11-01 DIAGNOSIS — I10 ESSENTIAL HYPERTENSION: Primary | ICD-10-CM

## 2023-11-01 DIAGNOSIS — E78.2 MIXED HYPERLIPIDEMIA: Chronic | ICD-10-CM

## 2023-11-01 NOTE — PROGRESS NOTES
[facility] Cardiology    Patient ID: Francisco Gonzalez is a 72 y.o. male.  : 1950   Contact: [phone]    Encounter date: 2023    PCP: Newton Cruz MD      Chief complaint:   Hypertension hyperlipidemia    Problem List:  Chest pain  Cardiolite 2013:  EF 63%, negative for ischemia  Echocardiogram, 2017. EF: 60%. No significant ST or T wave changes noted. No evidence of inducible ischemia by clinical, electrocardiographic or echocardiographic criteria.  Expected exercise duration 8 minutes.  Actual exercise duration 13 minutes  Hypertension  Hyperlipidemia  Diabetes  BPH s/p UroLift    No Known Allergies    Current Medications:    Current Outpatient Medications:     Accu-Chek FastClix Lancets misc, Use to check blood sugar daily dx e11.65, Disp: 100 each, Rfl: 3    Accu-Chek Guide test strip, USE TO TEST BLOOD SUGAR AS DIRECTED EVERY DAY, Disp: 100 each, Rfl: 1    alfuzosin (UROXATRAL) 10 MG 24 hr tablet, Take 1 tablet by mouth Daily., Disp: , Rfl:     ALPRAZolam (XANAX) 0.5 MG tablet, Take 1 tablet by mouth At Night As Needed for Anxiety or Sleep., Disp: 30 tablet, Rfl: 0    aspirin 81 MG tablet, Take 1 tablet by mouth Daily., Disp: , Rfl:     atorvastatin (LIPITOR) 20 MG tablet, TAKE 1 TABLET BY MOUTH DAILY, Disp: 90 tablet, Rfl: 1    Cream Base cream, Hydrocortisone 2.5%-nitroglycerin 0.2% ointment: Apply a small amount to the appropriate area twice a day as needed, Disp: 30 g, Rfl: 1    cyclobenzaprine (FLEXERIL) 10 MG tablet, Take 1 tablet by mouth 2 (Two) Times a Day As Needed for Muscle Spasms., Disp: 30 tablet, Rfl: 3    Hydrocortisone, Perianal, (ANUSOL-HC) 2.5 % rectal cream, Insert  into the rectum 2 (Two) Times a Day. (Patient taking differently: Insert 1 application  into the rectum 2 (Two) Times a Day As Needed.), Disp: 30 g, Rfl: 2    Jardiance 25 MG tablet tablet, TAKE 1 TABLET BY MOUTH DAILY, Disp: 90 tablet, Rfl: 3    metFORMIN  "(GLUCOPHAGE) 1000 MG tablet, TAKE 1 TABLET BY MOUTH TWICE DAILY WITH MEALS, Disp: 180 tablet, Rfl: 3    Mirabegron ER (MYRBETRIQ) 50 MG tablet sustained-release 24 hour 24 hr tablet, Take 50 mg by mouth Daily., Disp: , Rfl:     Multiple Vitamins-Minerals (MULTIVITAMIN ADULTS 50+ PO), Take 1 tablet by mouth Daily., Disp: , Rfl:     ondansetron (Zofran) 4 MG tablet, Take 1 tablet by mouth Every 8 (Eight) Hours As Needed for Nausea or Vomiting., Disp: 30 tablet, Rfl: 2    tadalafil (Cialis) 10 MG tablet, Take 1 tablet by mouth Daily As Needed for Erectile Dysfunction., Disp: 30 tablet, Rfl: 3    HPI    Francisco Gonzalez is a 72 y.o. male who presents today for a 1 year follow up of hypertension, hyperlipidemia and cardiac risk factors.  Since his last visit patient has overall been doing well.  He remains active in fact he states he went to the gym today.  He has no exertional chest pain or shortness of breath.  In fact he is at not had any symptoms since he last had a stress test years ago.  Patient states that he does not check his blood pressure all that often because it has not been elevated.  Patient does state that he is trying to keep his blood sugar under control recently.  Overall patient has no complaints today.      The following portions of the patient's history were reviewed and updated as appropriate: allergies, current medications and problem list.    Pertinent positives as listed in the HPI.  All other systems reviewed are negative.         Vitals:    11/01/23 1415   BP: 120/60   BP Location: Left arm   Patient Position: Sitting   Cuff Size: Adult   Pulse: 81   SpO2: 95%   Weight: 79.9 kg (176 lb 3.2 oz)   Height: 182.9 cm (72\")       Physical Exam:  General: Alert and oriented.  Neck: Jugular venous pressure is within normal limits. Carotids have normal upstrokes without bruits.   Cardiovascular: Heart has a nondisplaced focal PMI. Regular rate and rhythm. No murmur, gallop or rub.  Lungs: Clear, no " rales or wheezes. Equal expansion is noted.   Extremities: Show no edema.  Skin: Warm and dry.  Neurologic: Nonfocal.     Diagnostic Data (reviewed with patient):  Lab Results   Component Value Date    GLUCOSE 147 (H) 07/27/2023    BUN 28 (H) 07/27/2023    CREATININE 1.19 07/27/2023    BCR 23.5 07/27/2023     07/27/2023    K 4.3 07/27/2023     07/27/2023    CO2 23.4 07/27/2023    CALCIUM 9.0 07/27/2023    ALBUMIN 3.6 07/27/2023    ALKPHOS 59 07/27/2023    AST 17 07/27/2023    ALT 18 07/27/2023     Lab Results   Component Value Date    CHOL 163 01/20/2023    TRIG 71 01/20/2023    HDL 68 (H) 01/20/2023    LDL 81 01/20/2023      Lab Results   Component Value Date    WBC 4.60 07/27/2023    RBC 5.07 07/27/2023    HGB 15.7 07/27/2023    HCT 45.7 07/27/2023    MCV 90.1 07/27/2023     07/27/2023      Lab Results   Component Value Date    TSH 1.670 01/20/2023        Advance Care Planning   ACP discussion was declined by the patient. Patient has an advance directive in EMR which is still valid.              Assessment:    ICD-10-CM ICD-9-CM   1. Exertional chest pain  R07.9 786.50   2. Essential hypertension  I10 401.9   3. Mixed hyperlipidemia  E78.2 272.2         Plan:  Continue on aspirin 81 mg for antiplatelet therapy.  To reduce his cardiovascular risk.  Continue on atorvastatin 20 mg daily for hyperlipidemia.   Continue all other current medications.  F/up in 12 months, sooner if needed.    Maggie Newman PA-C

## 2023-11-03 ENCOUNTER — OFFICE VISIT (OUTPATIENT)
Dept: ENDOCRINOLOGY | Facility: CLINIC | Age: 73
End: 2023-11-03
Payer: MEDICARE

## 2023-11-03 VITALS
WEIGHT: 174.8 LBS | SYSTOLIC BLOOD PRESSURE: 114 MMHG | DIASTOLIC BLOOD PRESSURE: 58 MMHG | BODY MASS INDEX: 23.68 KG/M2 | HEIGHT: 72 IN | HEART RATE: 69 BPM | OXYGEN SATURATION: 97 %

## 2023-11-03 DIAGNOSIS — E78.2 MIXED HYPERLIPIDEMIA: Chronic | ICD-10-CM

## 2023-11-03 DIAGNOSIS — E11.65 TYPE 2 DIABETES MELLITUS WITH HYPERGLYCEMIA, WITHOUT LONG-TERM CURRENT USE OF INSULIN: Primary | ICD-10-CM

## 2023-11-03 LAB
EXPIRATION DATE: ABNORMAL
EXPIRATION DATE: ABNORMAL
GLUCOSE BLDC GLUCOMTR-MCNC: 171 MG/DL (ref 70–130)
HBA1C MFR BLD: 6.7 % (ref 4.5–5.7)
Lab: ABNORMAL
Lab: ABNORMAL

## 2023-11-03 NOTE — PROGRESS NOTES
"     Office Note      Date: 2023  Patient Name: Francisco Gonzalez  MRN: [id number]  : 1950    Chief Complaint   Patient presents with    Diabetes     Type 2 diabetes mellitus with hyperglycemia, without long-term current use of insulin         History of Present Illness:   Francisco Gonzalez is a 72 y.o. male who presents for Diabetes type 2. Diagnosed in: . Treated in past with oral agents. Current treatments: metformin and jardiance. Number of insulin shots per day: none. Checks blood sugar 1 time a day. Has low blood sugar: no. Aspirin use: Yes. Statin use: Yes. ACE-I/ARB use: No. Changes in health since last visit: urolift procedure. Last eye exam 2022.     Subjective      Diabetic Complications:  Eyes: No  Kidneys: No  Feet: No  Heart: No    Diet and Exercise:  Meals per day: 3  Minutes of exercise per week: 270 mins.    Review of Systems:   Review of Systems   Constitutional: Negative.    Cardiovascular: Negative.    Gastrointestinal: Negative.    Endocrine: Negative.        The following portions of the patient's history were reviewed and updated as appropriate: allergies, current medications, past family history, past medical history, past social history, past surgical history, and problem list.    Objective     Visit Vitals  /58 (BP Location: Left arm, Patient Position: Sitting, Cuff Size: Adult)   Pulse 69   Ht 182.9 cm (72\")   Wt 79.3 kg (174 lb 12.8 oz)   SpO2 97%   BMI 23.71 kg/m²       Physical Exam:  Physical Exam  Constitutional:       Appearance: Normal appearance.   Neurological:      Mental Status: He is alert.         Labs:    HbA1c  Lab Results   Component Value Date    HGBA1C 6.7 (A) 2023       CMP  Lab Results   Component Value Date    GLUCOSE 147 (H) 2023    BUN 28 (H) 2023    CREATININE 1.19 2023    EGFRIFNONA 61 2022    BCR 23.5 2023    K 4.3 2023    CO2 23.4 2023    CALCIUM 9.0 2023    AST 17 2023    " ALT 18 07/27/2023        Lipid Panel  Lab Results   Component Value Date    HDL 68 (H) 01/20/2023    LDL 81 01/20/2023    TRIG 71 01/20/2023        TSH  Lab Results   Component Value Date    TSH 1.670 01/20/2023        Hemoglobin A1C  Lab Results   Component Value Date    HGBA1C 6.7 (A) 11/03/2023        Microalbumin/Creatinine  Lab Results   Component Value Date    MALBCRERATIO  01/20/2023      Comment:      Unable to calculate    MICROALBUR <1.2 01/20/2023           Assessment / Plan      Assessment & Plan:  Diagnoses and all orders for this visit:    1. Type 2 diabetes mellitus with hyperglycemia, without long-term current use of insulin (Primary)  Assessment & Plan:  Diabetes is improving with treatment.   Continue current treatment regimen.  Diabetes will be reassessed in 6 months.    Orders:  -     POC Glycosylated Hemoglobin (Hb A1C)  -     POC Glucose, Blood    2. Mixed hyperlipidemia  Assessment & Plan:  Continue statin.  Plan to check lipids next visit.        Current Outpatient Medications   Medication Instructions    Accu-Chek FastClix Lancets misc Use to check blood sugar daily dx e11.65    Accu-Chek Guide test strip USE TO TEST BLOOD SUGAR AS DIRECTED EVERY DAY    alfuzosin (UROXATRAL) 10 mg, Oral, Daily    ALPRAZolam (XANAX) 0.5 mg, Oral, Nightly PRN    aspirin 81 mg, Oral, Daily    atorvastatin (LIPITOR) 20 mg, Oral, Daily    Cream Base cream Hydrocortisone 2.5%-nitroglycerin 0.2% ointment: Apply a small amount to the appropriate area twice a day as needed    cyclobenzaprine (FLEXERIL) 10 mg, Oral, 2 Times Daily PRN    Hydrocortisone, Perianal, (ANUSOL-HC) 2.5 % rectal cream Rectal, 2 Times Daily    Jardiance 25 MG tablet tablet TAKE 1 TABLET BY MOUTH DAILY    metFORMIN (GLUCOPHAGE) 1000 MG tablet TAKE 1 TABLET BY MOUTH TWICE DAILY WITH MEALS    Mirabegron ER (MYRBETRIQ) 50 MG tablet sustained-release 24 hour 24 hr tablet 1 tablet, Oral, Daily    Multiple Vitamins-Minerals (MULTIVITAMIN ADULTS 50+  PO) 1 tablet, Oral, Daily    ondansetron (ZOFRAN) 4 mg, Oral, Every 8 Hours PRN    tadalafil (CIALIS) 10 mg, Oral, Daily PRN      Return in about 6 months (around 5/3/2024) for Recheck with A1c, CMP, lipid, TSH, microalbumin, foot exam.    Jose Allen MD   11/03/2023

## 2023-11-06 ENCOUNTER — SPECIALTY PHARMACY (OUTPATIENT)
Dept: ENDOCRINOLOGY | Facility: CLINIC | Age: 73
End: 2023-11-06
Payer: MEDICARE

## 2023-11-06 NOTE — PROGRESS NOTES
Specialty Pharmacy Patient Management Program  Endocrinology Introduction to Services Outreach     Francisco Gonzalez is a 73 y.o. male seen by an Endocrinology provider for Type 2 Diabetes. This was an initial visit to introduce Endocrinology Patient Management Program and Specialty Pharmacy services offered by [facility], as the patient is taking specialty medication Jardiance.     Patient insurance plan will be changing at end of this year, so enrollment is therefore UNDER REVIEW. Patient remains eligible for services in the future if desired. Pharmacy team plans to complete another benefits investigation during the first week of January and send patient HRBoss message at that time.     Asmita Jorgensen, PharmD, BCACP  Clinical Specialty Pharmacist, Endocrinology  11/6/2023  11:04 EST

## 2023-11-16 DIAGNOSIS — E11.65 TYPE 2 DIABETES MELLITUS WITH HYPERGLYCEMIA, WITHOUT LONG-TERM CURRENT USE OF INSULIN: Chronic | ICD-10-CM

## 2023-11-16 RX ORDER — BLOOD SUGAR DIAGNOSTIC
STRIP MISCELLANEOUS
Qty: 100 EACH | Refills: 1 | Status: SHIPPED | OUTPATIENT
Start: 2023-11-16

## 2023-12-18 RX ORDER — HYDROCORTISONE 25 MG/G
CREAM TOPICAL 2 TIMES DAILY
Qty: 30 G | Refills: 2 | Status: SHIPPED | OUTPATIENT
Start: 2023-12-18

## 2023-12-18 NOTE — TELEPHONE ENCOUNTER
Rx Refill Note  Requested Prescriptions     Pending Prescriptions Disp Refills    Hydrocortisone, Perianal, (ANUSOL-HC) 2.5 % rectal cream 30 g 2     Sig: Insert  into the rectum 2 (Two) Times a Day.      Last office visit with prescribing clinician: 1/25/2023   Last telemedicine visit with prescribing clinician: Visit date not found   Next office visit with prescribing clinician: 2/9/2024                         Would you like a call back once the refill request has been completed: [] Yes [] No    If the office needs to give you a call back, can they leave a voicemail: [] Yes [] No    Sonia Longo RN  12/18/23, 16:58 EST

## 2023-12-22 RX ORDER — EMOLLIENT BASE
CREAM (GRAM) TOPICAL
Qty: 30 G | Refills: 1 | Status: SHIPPED | OUTPATIENT
Start: 2023-12-22

## 2023-12-22 NOTE — TELEPHONE ENCOUNTER
Rx Refill Note  Requested Prescriptions     Pending Prescriptions Disp Refills    Cream Base cream 30 g 1     Sig: Hydrocortisone 2.5%-nitroglycerin 0.2% ointment: Apply a small amount to the appropriate area twice a day as needed      Last office visit with prescribing clinician: 1/25/2023   Last telemedicine visit with prescribing clinician: Visit date not found   Next office visit with prescribing clinician: 2/9/2024                         Would you like a call back once the refill request has been completed: [] Yes [] No    If the office needs to give you a call back, can they leave a voicemail: [] Yes [] No    Tamika Michelle LPN  12/22/23, 09:27 EST

## 2024-01-04 ENCOUNTER — TELEPHONE (OUTPATIENT)
Dept: ENDOCRINOLOGY | Facility: CLINIC | Age: 74
End: 2024-01-04
Payer: MEDICARE

## 2024-01-04 ENCOUNTER — SPECIALTY PHARMACY (OUTPATIENT)
Dept: ENDOCRINOLOGY | Facility: CLINIC | Age: 74
End: 2024-01-04
Payer: MEDICARE

## 2024-01-04 DIAGNOSIS — E11.65 TYPE 2 DIABETES MELLITUS WITH HYPERGLYCEMIA, WITHOUT LONG-TERM CURRENT USE OF INSULIN: Chronic | ICD-10-CM

## 2024-01-04 RX ORDER — LANCETS
EACH MISCELLANEOUS
Qty: 100 EACH | Refills: 3 | Status: SHIPPED | OUTPATIENT
Start: 2024-01-04

## 2024-01-04 RX ORDER — BLOOD SUGAR DIAGNOSTIC
STRIP MISCELLANEOUS
Qty: 100 EACH | Refills: 3 | Status: SHIPPED | OUTPATIENT
Start: 2024-01-04

## 2024-01-04 RX ORDER — ATORVASTATIN CALCIUM 20 MG/1
20 TABLET, FILM COATED ORAL DAILY
Qty: 90 TABLET | Refills: 1 | Status: SHIPPED | OUTPATIENT
Start: 2024-01-04

## 2024-01-04 NOTE — TELEPHONE ENCOUNTER
Specialty Pharmacy Patient Management Program  Prescription Refill Request     Patient would like to begin filling prescriptions at  Pharmacy. Enrolled in Specialty Program.     Requested Prescriptions     Pending Prescriptions Disp Refills    Accu-Chek FastClix Lancets misc 100 each 3     Sig: Use to check blood sugar daily dx e11.65    Accu-Chek Guide test strip 100 each 3     Sig: USE TO TEST BLOOD SUGAR AS DIRECTED EVERY DAY    empagliflozin (Jardiance) 25 MG tablet tablet 90 tablet 3     Sig: Take 1 tablet by mouth Daily.    metFORMIN (GLUCOPHAGE) 1000 MG tablet 180 tablet 3     Sig: Take 1 tablet by mouth 2 (Two) Times a Day With Meals.     Pended for endocrinology provider, Dr. Allen, to review and approve if appropriate.     Asmita Jorgensen, PharmD, BCACP  Clinical Specialty Pharmacist, Endocrinology  1/4/2024  11:12 EST

## 2024-01-04 NOTE — PROGRESS NOTES
Specialty Pharmacy Patient Management Program  Endocrinology Initial Assessment     Fracnisco Gonzalez was referred by an Endocrinology provider to the Endocrinology Patient Management program offered by [facility] for Type 2 Diabetes on 01/04/24.  An initial outreach was conducted, including assessment of therapy appropriateness and specialty medication education for Jardiance. The patient was introduced to services offered by [facility], including: regular assessments, refill coordination, curbside pick-up or mail order delivery options, prior authorization maintenance, and financial assistance programs as applicable. The patient was also provided with contact information for the pharmacy team.     Insurance Coverage & Financial Support  Medicare Part D     Relevant Past Medical History and Comorbidities  Relevant medical history and concomitant health conditions were discussed with the patient. The patient's chart has been reviewed for relevant past medical history and comorbid conditions and updated as necessary.  Past Medical History:   Diagnosis Date    Anxiety     Chicken pox     Colonic polyp     Essential hypertension     Hydrocele 2001    left; repaired    Hyperlipidemia     Measles     Multiple endocrine neoplasia     Quadriceps tendon rupture 2010    Type 2 diabetes mellitus      Social History     Socioeconomic History    Marital status:    Tobacco Use    Smoking status: Never     Passive exposure: Past    Smokeless tobacco: Never   Vaping Use    Vaping Use: Never used   Substance and Sexual Activity    Alcohol use: Yes     Alcohol/week: 1.0 standard drink of alcohol     Types: 1 Cans of beer per week    Drug use: No    Sexual activity: Yes     Partners: Female     Birth control/protection: Surgical       Problem list reviewed by Asmita Jorgensen, PharmD on 1/4/2024 at 10:55 AM    Allergies  Known allergies and reactions were discussed with the  patient. The patient's chart has been reviewed for  allergy information and updated as necessary.   No Known Allergies    Allergies reviewed by Asmita Jorgensen PharmD on 1/4/2024 at 10:54 AM    Relevant Laboratory Values  Relevant laboratory values were discussed with the patient. The following specialty medication dose adjustment(s) are recommended: None   A1C Last 3 Results          5/3/2023    15:37 7/27/2023    15:48 11/3/2023    10:35   HGBA1C Last 3 Results   Hemoglobin A1C 6.7  7.10  6.7      Lab Results   Component Value Date    HGBA1C 6.7 (A) 11/03/2023     Lab Results   Component Value Date    GLUCOSE 147 (H) 07/27/2023    CALCIUM 9.0 07/27/2023     07/27/2023    K 4.3 07/27/2023    CO2 23.4 07/27/2023     07/27/2023    BUN 28 (H) 07/27/2023    CREATININE 1.19 07/27/2023    EGFRIFNONA 61 01/05/2022    BCR 23.5 07/27/2023    ANIONGAP 12.6 07/27/2023     Lab Results   Component Value Date    CHOL 163 01/20/2023    TRIG 71 01/20/2023    HDL 68 (H) 01/20/2023    LDL 81 01/20/2023     Microalbumin          1/20/2023    08:32   Microalbumin   Microalbumin, Urine <1.2        Current Medication List  This medication list has been reviewed with the patient and evaluated for any interactions or necessary modifications/recommendations, and updated to include all prescription medications, OTC medications, and supplements the patient is currently taking.  This list reflects what is contained in the patient's profile, which has also been marked as reviewed to communicate to other providers it is the most up to date version of the patient's current medication therapy.     Current Outpatient Medications:     Accu-Chek FastClix Lancets misc, Use to check blood sugar daily dx e11.65, Disp: 100 each, Rfl: 3    Accu-Chek Guide test strip, USE TO TEST BLOOD SUGAR AS DIRECTED EVERY DAY, Disp: 100 each, Rfl: 1    alfuzosin (UROXATRAL) 10 MG 24 hr tablet, Take 1 tablet by mouth Daily., Disp: , Rfl:     ALPRAZolam  (XANAX) 0.5 MG tablet, Take 1 tablet by mouth At Night As Needed for Anxiety or Sleep., Disp: 30 tablet, Rfl: 0    aspirin 81 MG tablet, Take 1 tablet by mouth Daily., Disp: , Rfl:     atorvastatin (LIPITOR) 20 MG tablet, TAKE 1 TABLET BY MOUTH DAILY, Disp: 90 tablet, Rfl: 1    Cream Base cream, Hydrocortisone 2.5%-nitroglycerin 0.2% ointment: Apply a small amount to the appropriate area twice a day as needed, Disp: 30 g, Rfl: 1    cyclobenzaprine (FLEXERIL) 10 MG tablet, Take 1 tablet by mouth 2 (Two) Times a Day As Needed for Muscle Spasms., Disp: 30 tablet, Rfl: 3    Hydrocortisone, Perianal, (ANUSOL-HC) 2.5 % rectal cream, Insert  into the rectum 2 (Two) Times a Day., Disp: 30 g, Rfl: 2    Jardiance 25 MG tablet tablet, TAKE 1 TABLET BY MOUTH DAILY, Disp: 90 tablet, Rfl: 3    metFORMIN (GLUCOPHAGE) 1000 MG tablet, TAKE 1 TABLET BY MOUTH TWICE DAILY WITH MEALS, Disp: 180 tablet, Rfl: 3    Mirabegron ER (MYRBETRIQ) 50 MG tablet sustained-release 24 hour 24 hr tablet, Take 50 mg by mouth Daily., Disp: , Rfl:     Mirabegron ER (Myrbetriq) 50 MG tablet sustained-release 24 hour 24 hr tablet, Take 50 mg by mouth Daily for 30 days., Disp: 30 tablet, Rfl: 0    Multiple Vitamins-Minerals (MULTIVITAMIN ADULTS 50+ PO), Take 1 tablet by mouth Daily., Disp: , Rfl:     ondansetron (Zofran) 4 MG tablet, Take 1 tablet by mouth Every 8 (Eight) Hours As Needed for Nausea or Vomiting., Disp: 30 tablet, Rfl: 2    tadalafil (Cialis) 10 MG tablet, Take 1 tablet by mouth Daily As Needed for Erectile Dysfunction., Disp: 30 tablet, Rfl: 3    Medicines reviewed by Asmita Jorgensen, PharmD on 1/4/2024 at 10:55 AM    Drug Interactions  No Clinically Significant DDIs Were Identified at Present Time Upon Marking Medications Reviewed    Recommended Medications Assessment  Aspirin: Not Taking Currently  Statin: Currently Taking   ACEi/ARB: Not Taking Currently    Initial Education Provided for Specialty Medication  The patient has been provided  with the following education and any applicable administration techniques (i.e. self-injection) have been demonstrated for the therapies indicated. All questions and concerns have been addressed prior to the patient receiving the medication, and the patient has verbalized comprehension of the education and any materials provided. Additional patient education shall be provided and documented upon request by the patient, provider, or payer.    JARDIANCE® (empagliflozin)  Medication Expectations   Why am I taking this medication? You are taking this medication to lower blood sugar because you have type 2 diabetes. Diabetes is not curable but with proper medication and treatment, we can keep your blood sugar within your personalized target range. This medication also helps reduce the risk of death from heart attack or stroke if you have heart disease and type 2 diabetes.   What should I expect while on this medication? You should expect to see your blood sugar and A1c decrease over time. You may also see a decrease in your blood pressure and it can help some people lose weight.     How does the medication work? Jardiance works by helping to remove some sugar that the body doesn't need through urination.    How long will I be on this medication for? The amount of time you will be on this medication will be determined by your doctor based on blood sugar and A1c control. You will most likely be on this medication or another diabetes medication throughout your lifetime. Do not abruptly stop this medication without talking to your doctor first.    How do I take this medication? Take as directed on your prescription label. This medication is usually taken in the morning and can be given with or without food.    What are some possible side effects? You may notice increased urination, especially when you first start Jardiance. The most common side effects are urinary tract infections and yeast infections and are more commonly  seen in females. Talk with your doctor if you notice white or yellow vaginal discharge, vaginal itching or odor of if you notice redness, itching, pain, or swelling of the penis and/or bad-smelling discharge from the penis.    What happens if I miss a dose? If you miss a dose, take it as soon as you remember. If it is close to your next dose, skip it (do not take 2 doses at once)     Medication Safety   What are things I should warn my doctor immediately about? Tell your doctor if you have kidney disease, liver disease, heart failure, pancreas problems, or history of frequent genital yeast or urinary tract infections. Tell your doctor if you are on a low-salt diet, if you drink alcohol, or if you are having surgery. Talk to your doctor if you are pregnant, planning to become pregnant, or breastfeeding. Also tell your doctor if you notice any signs/symptoms of an allergic reaction (rash, hives, difficulty breathing, etc.).   What are things that I should be cautious of? Be cautious of any side effects from this medication. Talk to your doctor if any new ones develop or aren't getting better.   What are some medications that can interact with this one? Some medications that interact include diuretics (water pills) and other medications that may also lower your blood sugar such as insulins and glipizide/glimepiride/glyburide. Your doctor may reduce the dose of these medications when you start Jardiance to minimize low blood sugars. Always tell your doctor or pharmacist immediately if you start taking any new medications, including over-the-counter medications, vitamins, and herbal supplements.      Medication Storage/Handling   How should I handle this medication? Keep this medication out of reach of pets/children in tightly sealed container   How does this medication need to be stored? Store at room temperature and keep dry (don't keep in bathroom or other room with moisture)   How should I dispose of this medication?  There should not be a need to dispose of this medication unless your provider decides to change the dose or therapy. If that is the case, take to your local police station for proper disposal. Some pharmacies also have take-back bins for medication drop-off.      Resources/Support   How can I remind myself to take this medication? You can download reminder apps to help you manage your refills. You may also set an alarm on your phone to remind you. The pharmacy carries pill boxes that you can place next to an area you pass everyday (such as where you place your car keys or where you charge your phone)   Is financial support available?  [employer] can provide co-pay cards if you have commercial insurance or patient assistance if you have Medicare or no insurance.    Which vaccines are recommended for me? Talk to your doctor about these vaccines: Flu, Coronavirus (COVID-19), Pneumococcal (pneumonia), Tdap, Hepatitis B, Zoster (shingles)        Adherence and Self-Administration  Adherence related to the patient's specialty therapy was discussed with the patient. The Adherence segment of this outreach has been reviewed and updated.     Is there a concern with patient's ability to self administer the medication correctly and without issue?: No  Were any potential barriers to adherence identified during the initial assessment or patient education?: No  Are there any concerns regarding the patient's understanding of the importance of medication adherence?: No  Methods for Supporting Patient Adherence and/or Self-Administration: N/A    Open Medication Therapy Problems  No medication therapy recommendations to display    Goals of Therapy  Goals related to the patient's specialty therapy were discussed with the patient. The Patient Goals segment of this outreach has been reviewed and updated.   Goals Addressed Today        Specialty Pharmacy General Goal      A1C < 7 %     1/4/23: On Enrollment, most recent A1C  was 6.7%             Reassessment Plan & Follow-Up  1. Medication Therapy Changes: None  2. Related Plans, Therapy Recommendations, or Therapy Problems to Be Addressed: None  3. Pharmacist to perform regular assessments no more than (6) months from the previous assessment.  4. Care Coordinator to set up future refill outreaches, coordinate prescription delivery, and escalate clinical questions to pharmacist.  5. Welcome information and patient satisfaction survey to be sent by specialty pharmacy team with patient's initial fill.    Attestation  Therapeutic appropriateness: Appropriate   I attest the patient was actively involved in and has agreed to the above plan of care. If the prescribed therapy is at any point deemed not appropriate based on the current or future assessments, a consultation will be initiated with the patient's specialty care provider to determine the best course of action. The revised plan of therapy will be documented along with any required assessments and/or additional patient education provided.     Asmita Jorgensen, PharmD, BCACP  Clinical Specialty Pharmacist, Endocrinology  1/4/2024  10:57 EST

## 2024-01-05 RX ORDER — TADALAFIL 5 MG/1
TABLET ORAL
Qty: 60 TABLET | Refills: 0 | Status: SHIPPED | OUTPATIENT
Start: 2024-01-05

## 2024-01-22 DIAGNOSIS — D72.19 OTHER EOSINOPHILIA: ICD-10-CM

## 2024-01-22 DIAGNOSIS — Z12.5 PROSTATE CANCER SCREENING: ICD-10-CM

## 2024-01-22 DIAGNOSIS — E78.2 MIXED HYPERLIPIDEMIA: Primary | Chronic | ICD-10-CM

## 2024-01-22 DIAGNOSIS — E11.65 TYPE 2 DIABETES MELLITUS WITH HYPERGLYCEMIA, WITHOUT LONG-TERM CURRENT USE OF INSULIN: Chronic | ICD-10-CM

## 2024-01-23 ENCOUNTER — LAB (OUTPATIENT)
Dept: LAB | Facility: HOSPITAL | Age: 74
End: 2024-01-23
Payer: MEDICARE

## 2024-01-23 DIAGNOSIS — E78.2 MIXED HYPERLIPIDEMIA: Chronic | ICD-10-CM

## 2024-01-23 DIAGNOSIS — E11.65 TYPE 2 DIABETES MELLITUS WITH HYPERGLYCEMIA, WITHOUT LONG-TERM CURRENT USE OF INSULIN: Chronic | ICD-10-CM

## 2024-01-23 DIAGNOSIS — Z12.5 PROSTATE CANCER SCREENING: ICD-10-CM

## 2024-01-23 DIAGNOSIS — D72.19 OTHER EOSINOPHILIA: ICD-10-CM

## 2024-01-23 LAB
ALBUMIN SERPL-MCNC: 3.8 G/DL (ref 3.5–5.2)
ALBUMIN UR-MCNC: <1.2 MG/DL
ALBUMIN/GLOB SERPL: 1.9 G/DL
ALP SERPL-CCNC: 62 U/L (ref 39–117)
ALT SERPL W P-5'-P-CCNC: 22 U/L (ref 1–41)
ANION GAP SERPL CALCULATED.3IONS-SCNC: 9.4 MMOL/L (ref 5–15)
AST SERPL-CCNC: 19 U/L (ref 1–40)
BASOPHILS # BLD AUTO: 0.04 10*3/MM3 (ref 0–0.2)
BASOPHILS NFR BLD AUTO: 0.9 % (ref 0–1.5)
BILIRUB SERPL-MCNC: 0.5 MG/DL (ref 0–1.2)
BUN SERPL-MCNC: 23 MG/DL (ref 8–23)
BUN/CREAT SERPL: 21.5 (ref 7–25)
CALCIUM SPEC-SCNC: 9.4 MG/DL (ref 8.6–10.5)
CHLORIDE SERPL-SCNC: 105 MMOL/L (ref 98–107)
CHOLEST SERPL-MCNC: 165 MG/DL (ref 0–200)
CO2 SERPL-SCNC: 25.6 MMOL/L (ref 22–29)
CREAT SERPL-MCNC: 1.07 MG/DL (ref 0.76–1.27)
CREAT UR-MCNC: 66.4 MG/DL
DEPRECATED RDW RBC AUTO: 43.4 FL (ref 37–54)
EGFRCR SERPLBLD CKD-EPI 2021: 74.2 ML/MIN/1.73
EOSINOPHIL # BLD AUTO: 0.5 10*3/MM3 (ref 0–0.4)
EOSINOPHIL NFR BLD AUTO: 10.8 % (ref 0.3–6.2)
ERYTHROCYTE [DISTWIDTH] IN BLOOD BY AUTOMATED COUNT: 12.9 % (ref 12.3–15.4)
GLOBULIN UR ELPH-MCNC: 2 GM/DL
GLUCOSE SERPL-MCNC: 131 MG/DL (ref 65–99)
HCT VFR BLD AUTO: 47.1 % (ref 37.5–51)
HDLC SERPL-MCNC: 64 MG/DL (ref 40–60)
HGB BLD-MCNC: 15.8 G/DL (ref 13–17.7)
IMM GRANULOCYTES # BLD AUTO: 0.01 10*3/MM3 (ref 0–0.05)
IMM GRANULOCYTES NFR BLD AUTO: 0.2 % (ref 0–0.5)
LDLC SERPL CALC-MCNC: 87 MG/DL (ref 0–100)
LDLC/HDLC SERPL: 1.35 {RATIO}
LYMPHOCYTES # BLD AUTO: 1.37 10*3/MM3 (ref 0.7–3.1)
LYMPHOCYTES NFR BLD AUTO: 29.7 % (ref 19.6–45.3)
MCH RBC QN AUTO: 31.2 PG (ref 26.6–33)
MCHC RBC AUTO-ENTMCNC: 33.5 G/DL (ref 31.5–35.7)
MCV RBC AUTO: 92.9 FL (ref 79–97)
MICROALBUMIN/CREAT UR: NORMAL MG/G{CREAT}
MONOCYTES # BLD AUTO: 0.45 10*3/MM3 (ref 0.1–0.9)
MONOCYTES NFR BLD AUTO: 9.7 % (ref 5–12)
NEUTROPHILS NFR BLD AUTO: 2.25 10*3/MM3 (ref 1.7–7)
NEUTROPHILS NFR BLD AUTO: 48.7 % (ref 42.7–76)
NRBC BLD AUTO-RTO: 0 /100 WBC (ref 0–0.2)
PLATELET # BLD AUTO: 216 10*3/MM3 (ref 140–450)
PMV BLD AUTO: 10.8 FL (ref 6–12)
POTASSIUM SERPL-SCNC: 4.7 MMOL/L (ref 3.5–5.2)
PROT SERPL-MCNC: 5.8 G/DL (ref 6–8.5)
PSA SERPL-MCNC: 0.83 NG/ML (ref 0–4)
RBC # BLD AUTO: 5.07 10*6/MM3 (ref 4.14–5.8)
SODIUM SERPL-SCNC: 140 MMOL/L (ref 136–145)
TRIGL SERPL-MCNC: 74 MG/DL (ref 0–150)
VLDLC SERPL-MCNC: 14 MG/DL (ref 5–40)
WBC NRBC COR # BLD AUTO: 4.62 10*3/MM3 (ref 3.4–10.8)

## 2024-01-23 PROCEDURE — 82570 ASSAY OF URINE CREATININE: CPT

## 2024-01-23 PROCEDURE — 80061 LIPID PANEL: CPT

## 2024-01-23 PROCEDURE — 80053 COMPREHEN METABOLIC PANEL: CPT

## 2024-01-23 PROCEDURE — 85025 COMPLETE CBC W/AUTO DIFF WBC: CPT

## 2024-01-23 PROCEDURE — 82043 UR ALBUMIN QUANTITATIVE: CPT

## 2024-01-23 PROCEDURE — G0103 PSA SCREENING: HCPCS

## 2024-01-30 ENCOUNTER — SPECIALTY PHARMACY (OUTPATIENT)
Dept: ENDOCRINOLOGY | Facility: CLINIC | Age: 74
End: 2024-01-30
Payer: MEDICARE

## 2024-01-30 NOTE — PROGRESS NOTES
" Specialty Pharmacy Patient Management Program  Endocrinology Refill Outreach      Francisco \"Babak\" is a 73 y.o. male contacted today regarding refills of his medication(s).    Specialty medication(s) and dose(s) confirmed: jardiance  Other medication(s) being refilled: alfuzosin, mybetriq, atorvastatin    Refill Questions      Flowsheet Row Most Recent Value   Changes to allergies? No   Changes to medications? No   New conditions or infections since last clinic visit No   Unplanned office visit, urgent care, ED, or hospital admission in the last 4 weeks  No   How does patient/caregiver feel medication is working? Very good   Financial problems or insurance changes  No   Since the previous refill, were any specialty medication doses or scheduled injections missed or delayed?  No   Does this patient require a clinical escalation to a pharmacist? No          Delivery Questions      Flowsheet Row Most Recent Value   Delivery method FedEx   Delivery address verified with patient/caregiver? Yes   Delivery address Home   Number of medications in delivery 4   Medication(s) being filled and delivered Atorvastatin Calcium (Hmg Coa Reductase Inhibitors), Mirabegron, Empagliflozin, Alfuzosin Hcl   Doses left of specialty medications 4   Copay verified? Yes   Copay amount 470.66   Copay form of payment Credit/debit on file            Follow-Up: 30d toya Recio CPhT  Pharmacy Care Coordinator, Endocrinology  1/30/2024  16:20 EST          "

## 2024-02-07 RX ORDER — FLUCONAZOLE 200 MG/1
200 TABLET ORAL DAILY
Qty: 14 TABLET | Refills: 0 | Status: SHIPPED | OUTPATIENT
Start: 2024-02-07

## 2024-02-09 ENCOUNTER — OFFICE VISIT (OUTPATIENT)
Dept: INTERNAL MEDICINE | Facility: CLINIC | Age: 74
End: 2024-02-09
Payer: MEDICARE

## 2024-02-09 ENCOUNTER — TELEPHONE (OUTPATIENT)
Dept: INTERNAL MEDICINE | Facility: CLINIC | Age: 74
End: 2024-02-09

## 2024-02-09 VITALS
HEIGHT: 72 IN | DIASTOLIC BLOOD PRESSURE: 58 MMHG | BODY MASS INDEX: 23.92 KG/M2 | TEMPERATURE: 97.8 F | HEART RATE: 92 BPM | OXYGEN SATURATION: 94 % | WEIGHT: 176.6 LBS | SYSTOLIC BLOOD PRESSURE: 118 MMHG

## 2024-02-09 DIAGNOSIS — Z00.00 PREVENTATIVE HEALTH CARE: Primary | ICD-10-CM

## 2024-02-09 DIAGNOSIS — E11.65 TYPE 2 DIABETES MELLITUS WITH HYPERGLYCEMIA, WITHOUT LONG-TERM CURRENT USE OF INSULIN: Chronic | ICD-10-CM

## 2024-02-09 DIAGNOSIS — E78.2 MIXED HYPERLIPIDEMIA: Chronic | ICD-10-CM

## 2024-02-09 PROCEDURE — G0439 PPPS, SUBSEQ VISIT: HCPCS | Performed by: INTERNAL MEDICINE

## 2024-02-09 PROCEDURE — 1159F MED LIST DOCD IN RCRD: CPT | Performed by: INTERNAL MEDICINE

## 2024-02-09 PROCEDURE — 1160F RVW MEDS BY RX/DR IN RCRD: CPT | Performed by: INTERNAL MEDICINE

## 2024-03-04 ENCOUNTER — SPECIALTY PHARMACY (OUTPATIENT)
Dept: ENDOCRINOLOGY | Facility: CLINIC | Age: 74
End: 2024-03-04
Payer: MEDICARE

## 2024-03-04 NOTE — PROGRESS NOTES
" Specialty Pharmacy Patient Management Program  Endocrinology Refill Outreach      Francisco \"Babak\" is a 73 y.o. male contacted today regarding refills of his medication(s).    Specialty medication(s) and dose(s) confirmed: None  Other medication(s) being refilled: Myrbetriq    Refill Questions      Flowsheet Row Most Recent Value   Changes to allergies? No   Changes to medications? No   New conditions or infections since last clinic visit No   Unplanned office visit, urgent care, ED, or hospital admission in the last 4 weeks  No   How does patient/caregiver feel medication is working? Very good   Financial problems or insurance changes  No   Since the previous refill, were any specialty medication doses or scheduled injections missed or delayed?  No   Does this patient require a clinical escalation to a pharmacist? No          Delivery Questions      Flowsheet Row Most Recent Value   Delivery method FedEx   Delivery address verified with patient/caregiver? Yes   Delivery address Home   Number of medications in delivery 1   Medication(s) being filled and delivered Mirabegron   Doses left of specialty medications 5-7 days   Copay verified? Yes   Copay amount $93.63   Copay form of payment Credit/debit on file            Follow-Up: 30d    Asmita Jorgensen, PharmD, BCACP  Clinical Specialty Pharmacist, Endocrinology  3/4/2024  08:12 EST    "

## 2024-03-15 ENCOUNTER — HOSPITAL ENCOUNTER (OUTPATIENT)
Dept: GENERAL RADIOLOGY | Facility: HOSPITAL | Age: 74
Discharge: HOME | End: 2024-03-15
Payer: MEDICARE

## 2024-03-15 DIAGNOSIS — M79.675 PAIN OF LEFT GREAT TOE: Primary | ICD-10-CM

## 2024-03-15 DIAGNOSIS — M79.675 PAIN OF LEFT GREAT TOE: ICD-10-CM

## 2024-03-15 PROCEDURE — 73630 X-RAY EXAM OF FOOT: CPT

## 2024-04-01 DIAGNOSIS — F41.1 ANXIETY STATE: ICD-10-CM

## 2024-04-01 RX ORDER — ALPRAZOLAM 0.5 MG/1
0.5 TABLET ORAL NIGHTLY PRN
Qty: 30 TABLET | Refills: 2 | Status: SHIPPED | OUTPATIENT
Start: 2024-04-01

## 2024-04-01 NOTE — TELEPHONE ENCOUNTER
Rx Refill Note  Requested Prescriptions     Pending Prescriptions Disp Refills    ALPRAZolam (XANAX) 0.5 MG tablet 30 tablet 0     Sig: Take 1 tablet by mouth At Night As Needed for Anxiety or Sleep.      Last office visit with prescribing clinician: 2/9/2024   Last telemedicine visit with prescribing clinician: Visit date not found   Next office visit with prescribing clinician: 8/14/2024     LA: 01/25/23 #30 0R                          Would you like a call back once the refill request has been completed: [] Yes [] No    If the office needs to give you a call back, can they leave a voicemail: [] Yes [] No    Gia Winn LPN  04/01/24, 11:30 EDT

## 2024-04-02 ENCOUNTER — SPECIALTY PHARMACY (OUTPATIENT)
Dept: ENDOCRINOLOGY | Facility: CLINIC | Age: 74
End: 2024-04-02
Payer: MEDICARE

## 2024-04-02 NOTE — PROGRESS NOTES
" Specialty Pharmacy Patient Management Program  Endocrinology Refill Outreach      Francisco \"Babak\" is a 73 y.o. male contacted today regarding refills of his medication(s).    Specialty medication(s) and dose(s) confirmed: None  Other medication(s) being refilled: Myrbetriq, Alprazolam (PRN)     Refill Questions      Flowsheet Row Most Recent Value   Changes to allergies? No   Changes to medications? No   New conditions or infections since last clinic visit No   Unplanned office visit, urgent care, ED, or hospital admission in the last 4 weeks  No   How does patient/caregiver feel medication is working? Very good   Financial problems or insurance changes  No   Since the previous refill, were any specialty medication doses or scheduled injections missed or delayed?  No          Delivery Questions      Flowsheet Row Most Recent Value   Delivery method FedEx   Delivery address verified with patient/caregiver? Yes   Delivery address Home   Number of medications in delivery 2   Medication(s) being filled and delivered Alprazolam (Benzodiazepines), Mirabegron   Doses left of specialty medications 5-7 days   Copay verified? Yes   Copay amount $94   Copay form of payment Credit/debit on file            Follow-Up: 30d    Asmita Jorgensen, PharmD, BCACP  Clinical Specialty Pharmacist, Endocrinology  4/2/2024  09:36 EDT    "

## 2024-04-30 ENCOUNTER — SPECIALTY PHARMACY (OUTPATIENT)
Dept: ENDOCRINOLOGY | Facility: CLINIC | Age: 74
End: 2024-04-30
Payer: MEDICARE

## 2024-04-30 NOTE — PROGRESS NOTES
" Specialty Pharmacy Patient Management Program  Endocrinology Refill Outreach      Francisco \"Babak\" is a 73 y.o. male contacted today regarding refills of his medication(s).    Specialty medication(s) and dose(s) confirmed: Jardiance    Refill Questions      Flowsheet Row Most Recent Value   Changes to allergies? No   Changes to medications? No   New conditions or infections since last clinic visit No   Unplanned office visit, urgent care, ED, or hospital admission in the last 4 weeks  No   How does patient/caregiver feel medication is working? Very good   Financial problems or insurance changes  No   Since the previous refill, were any specialty medication doses or scheduled injections missed or delayed?  No   Does this patient require a clinical escalation to a pharmacist? No          Delivery Questions      Flowsheet Row Most Recent Value   Delivery method FedEx   Delivery address verified with patient/caregiver? Yes   Delivery address Home   Number of medications in delivery 6   Medication(s) being filled and delivered Atorvastatin Calcium (Hmg Coa Reductase Inhibitors), Metformin Hcl (Biguanides), Alfuzosin Hcl, Mirabegron, Alprazolam (Benzodiazepines), Empagliflozin   Doses left of specialty medications 4 days   Copay verified? Yes   Copay amount $356.11   Copay form of payment Credit/debit on file            Follow-Up: 60d    Funmi Recio CPhT  Pharmacy Care Coordinator, Endocrinology  4/30/2024  10:19 EDT          "

## 2024-05-02 NOTE — CONSULTS
Consults   Patient attended scheduled diabetes nutrition class. Please see media tab for assessment and notes if you use [employer]. If you are not an [employer] user a copy of patient's assessment and notes will be sent per routine. Thank you.    General Sunscreen Counseling: I recommended a broad spectrum sunscreen with a SPF of 30 or higher.  I explained that SPF 30 sunscreens block approximately 97 percent of the sun's harmful rays.  Sunscreens should be applied at least 15 minutes prior to expected sun exposure and then every 2 hours after that as long as sun exposure continues. If swimming or exercising sunscreen should be reapplied every 45 minutes to an hour after getting wet or sweating.  One ounce, or the equivalent of a shot glass full of sunscreen, is adequate to protect the skin not covered by a bathing suit. I also recommended a lip balm with a sunscreen as well. Sun protective clothing can be used in lieu of sunscreen but must be worn the entire time you are exposed to the sun's rays. Products Recommended: Zinc or titanium SPF 30-50 Detail Level: Detailed

## 2024-05-20 ENCOUNTER — OFFICE VISIT (OUTPATIENT)
Dept: ENDOCRINOLOGY | Facility: CLINIC | Age: 74
End: 2024-05-20
Payer: MEDICARE

## 2024-05-20 ENCOUNTER — SPECIALTY PHARMACY (OUTPATIENT)
Dept: ENDOCRINOLOGY | Facility: CLINIC | Age: 74
End: 2024-05-20
Payer: MEDICARE

## 2024-05-20 VITALS
HEART RATE: 82 BPM | OXYGEN SATURATION: 94 % | SYSTOLIC BLOOD PRESSURE: 118 MMHG | BODY MASS INDEX: 24.38 KG/M2 | HEIGHT: 72 IN | WEIGHT: 180 LBS | DIASTOLIC BLOOD PRESSURE: 60 MMHG

## 2024-05-20 DIAGNOSIS — E78.2 MIXED HYPERLIPIDEMIA: Chronic | ICD-10-CM

## 2024-05-20 DIAGNOSIS — E11.65 TYPE 2 DIABETES MELLITUS WITH HYPERGLYCEMIA, WITHOUT LONG-TERM CURRENT USE OF INSULIN: Primary | ICD-10-CM

## 2024-05-20 LAB
EXPIRATION DATE: ABNORMAL
EXPIRATION DATE: ABNORMAL
GLUCOSE BLDC GLUCOMTR-MCNC: 161 MG/DL (ref 70–130)
HBA1C MFR BLD: 7.3 % (ref 4.5–5.7)
Lab: ABNORMAL
Lab: ABNORMAL
TSH SERPL DL<=0.05 MIU/L-ACNC: 1.38 UIU/ML (ref 0.27–4.2)

## 2024-05-20 PROCEDURE — 82947 ASSAY GLUCOSE BLOOD QUANT: CPT | Performed by: INTERNAL MEDICINE

## 2024-05-20 PROCEDURE — 3051F HG A1C>EQUAL 7.0%<8.0%: CPT | Performed by: INTERNAL MEDICINE

## 2024-05-20 PROCEDURE — 36415 COLL VENOUS BLD VENIPUNCTURE: CPT | Performed by: INTERNAL MEDICINE

## 2024-05-20 PROCEDURE — 83036 HEMOGLOBIN GLYCOSYLATED A1C: CPT | Performed by: INTERNAL MEDICINE

## 2024-05-20 PROCEDURE — 99214 OFFICE O/P EST MOD 30 MIN: CPT | Performed by: INTERNAL MEDICINE

## 2024-05-20 PROCEDURE — 1159F MED LIST DOCD IN RCRD: CPT | Performed by: INTERNAL MEDICINE

## 2024-05-20 PROCEDURE — 1160F RVW MEDS BY RX/DR IN RCRD: CPT | Performed by: INTERNAL MEDICINE

## 2024-05-20 PROCEDURE — 84443 ASSAY THYROID STIM HORMONE: CPT | Performed by: INTERNAL MEDICINE

## 2024-05-20 RX ORDER — TADALAFIL 10 MG/1
1 TABLET ORAL DAILY
COMMUNITY

## 2024-05-20 RX ORDER — ONDANSETRON 4 MG/1
1 TABLET, FILM COATED ORAL EVERY 8 HOURS PRN
COMMUNITY

## 2024-05-20 RX ORDER — LANCETS
1 EACH MISCELLANEOUS DAILY
COMMUNITY

## 2024-05-20 NOTE — PROGRESS NOTES
"     Office Note      Date: 2024  Patient Name: Francisco Gonzalez  MRN: [id number]  : 1950    Chief Complaint   Patient presents with    Diabetes     Type 2 diabetes mellitus with hyperglycemia, without long-term current use of insulin       History of Present Illness:   Francisco Gonzalez is a 73 y.o. male who presents for Diabetes type 2. Diagnosed in: . Treated in past with oral agents. Current treatments: metformin and jardiance. Number of insulin shots per day: none. Checks blood sugar 1 time a day. Has low blood sugar: no. Aspirin use: Yes. Statin use: Yes. ACE-I/ARB use: No. Changes in health since last visit: none. Last eye exam 2024.     Subjective      Diabetic Complications:  Eyes: No  Kidneys: No  Feet: No  Heart: No    Diet and Exercise:  Meals per day: 3  Minutes of exercise per week: 270 mins.    Review of Systems:   Review of Systems   Constitutional: Negative.    Cardiovascular: Negative.    Gastrointestinal: Negative.    Endocrine: Negative.        The following portions of the patient's history were reviewed and updated as appropriate: allergies, current medications, past family history, past medical history, past social history, past surgical history, and problem list.    Objective     Visit Vitals  /60 (BP Location: Left arm, Patient Position: Sitting, Cuff Size: Adult)   Pulse 82   Ht 182.5 cm (71.85\")   Wt 81.6 kg (180 lb)   SpO2 94%   BMI 24.51 kg/m²       Physical Exam:  Physical Exam  Constitutional:       Appearance: Normal appearance.   Cardiovascular:      Pulses:           Dorsalis pedis pulses are 2+ on the right side and 2+ on the left side.        Posterior tibial pulses are 2+ on the right side and 2+ on the left side.   Musculoskeletal:      Right foot: Deformity present.      Left foot: Deformity present.   Feet:      Right foot:      Protective Sensation: 5 sites tested.  5 sites sensed.      Skin integrity: Skin integrity normal.      Toenail " Condition: Right toenails are abnormally thick. Fungal disease present.     Left foot:      Protective Sensation: 5 sites tested.  5 sites sensed.      Skin integrity: Skin integrity normal.      Toenail Condition: Left toenails are abnormally thick. Fungal disease present.     Comments: Hammer toes  Neurological:      Mental Status: He is alert.         Labs:    HbA1c  Lab Results   Component Value Date    HGBA1C 7.3 (A) 05/20/2024       CMP  Lab Results   Component Value Date    GLUCOSE 131 (H) 01/23/2024    BUN 23 01/23/2024    CREATININE 1.07 01/23/2024    EGFRIFNONA 61 01/05/2022    BCR 21.5 01/23/2024    K 4.7 01/23/2024    CO2 25.6 01/23/2024    CALCIUM 9.4 01/23/2024    AST 19 01/23/2024    ALT 22 01/23/2024        Lipid Panel  Lab Results   Component Value Date    HDL 64 (H) 01/23/2024    LDL 87 01/23/2024    TRIG 74 01/23/2024        TSH  Lab Results   Component Value Date    TSH 1.670 01/20/2023        Hemoglobin A1C  Lab Results   Component Value Date    HGBA1C 7.3 (A) 05/20/2024        Microalbumin/Creatinine  Lab Results   Component Value Date    MALBCRERATIO  01/23/2024      Comment:      Unable to calculate    MICROALBUR <1.2 01/23/2024           Assessment / Plan      Assessment & Plan:  Diagnoses and all orders for this visit:    1. Type 2 diabetes mellitus with hyperglycemia, without long-term current use of insulin (Primary)  Assessment & Plan:  Diabetes is worsening.  A1c has crept up but still acceptable.  Continue current treatment regimen.  Work on diet.  Diabetes will be reassessed in 6 months.    Orders:  -     POC Glycosylated Hemoglobin (Hb A1C)  -     POC Glucose, Blood  -     TSH; Future    2. Mixed hyperlipidemia  Assessment & Plan:  Continue statin.  Recent lipids okay.        Current Outpatient Medications   Medication Instructions    Accu-Chek FastClix Lancets misc Use to check blood sugar daily dx e11.65    Accu-Chek FastClix Lancets misc 1 each, Other, Daily    Accu-Chek Guide  test strip USE TO TEST BLOOD SUGAR AS DIRECTED EVERY DAY    alfuzosin (UROXATRAL) 10 mg, Oral, Daily    ALPRAZolam (XANAX) 0.5 mg, Oral, Nightly PRN    aspirin 81 mg, Oral, Daily    atorvastatin (LIPITOR) 20 mg, Oral, Daily    Cream Base cream Hydrocortisone 2.5%-nitroglycerin 0.2% ointment: Apply a small amount to the appropriate area twice a day as needed    cyclobenzaprine (FLEXERIL) 10 mg, Oral, 2 Times Daily PRN    fluconazole (DIFLUCAN) 200 mg, Oral, Daily    glucose blood test strip USE TO TEST BLOOD SUGAR AS DIRECTED EVERY DAY    Hydrocortisone, Perianal, (ANUSOL-HC) 2.5 % rectal cream Rectal, 2 Times Daily    Jardiance 25 mg, Oral, Daily    metFORMIN (GLUCOPHAGE) 1,000 mg, Oral, 2 Times Daily With Meals    Mirabegron ER (Myrbetriq) 50 MG tablet sustained-release 24 hour 24 hr tablet Take 1 tablet by mouth Daily.    Multiple Vitamins-Minerals (MULTIVITAMIN ADULTS 50+ PO) 1 tablet, Oral, Daily    ondansetron (ZOFRAN) 4 MG tablet 1 tablet, Oral, Every 8 Hours PRN    tadalafil (Cialis) 10 MG tablet 1 tablet, Oral, Daily      Return in about 3 months (around 8/20/2024) for Recheck with A1c.    Electronically signed by: Jose Allen MD  05/20/2024

## 2024-05-20 NOTE — PROGRESS NOTES
Specialty Pharmacy Patient Management Program  Endocrinology Reassessment     Francisco Gonzalez was referred by an Endocrinology provider to the Endocrinology Patient Management program offered by [facility] for Type 2 Diabetes. A follow-up outreach was conducted, including assessment of continued therapy appropriateness, medication adherence, and side effect incidence and management for Jardiance.    Changes to Insurance Coverage or Financial Support  None    Relevant Past Medical History and Comorbidities  Relevant medical history and concomitant health conditions were discussed with the patient. The patient's chart has been reviewed for relevant past medical history and comorbid health conditions and updated as necessary.   Past Medical History:   Diagnosis Date    Anxiety     Benign prostatic hyperplasia 2014    Chicken pox     Colonic polyp     Essential hypertension     HL (hearing loss) 2016    Hydrocele 2001    left; repaired    Hyperlipidemia     Measles     Multiple endocrine neoplasia     Quadriceps tendon rupture 2010    Type 2 diabetes mellitus      Social History     Socioeconomic History    Marital status:    Tobacco Use    Smoking status: Never     Passive exposure: Past    Smokeless tobacco: Never   Vaping Use    Vaping status: Never Used   Substance and Sexual Activity    Alcohol use: Yes     Alcohol/week: 1.0 standard drink of alcohol     Types: 1 Cans of beer per week    Drug use: No    Sexual activity: Yes     Partners: Female     Birth control/protection: Surgical     Problem list reviewed by Asmita Jorgensen, PharmD on 5/20/2024 at  2:31 PM    Hospitalizations and Urgent Care Since Last Assessment  ED Visits, Admissions, or Hospitalizations: None Reported or Noted in EHR  Urgent Office Visits: None Reported or Noted in EHR    Allergies  Known allergies and reactions were discussed with the patient. The patient's chart has been reviewed for allergy information  and updated as necessary.   No Known Allergies  Allergies reviewed by Asmita Jorgensen, PharmGILLIAN on 5/20/2024 at  2:30 PM  Allergies reviewed by Asmita Jorgensen PharmD on 5/20/2024 at  2:31 PM    Relevant Laboratory Values  Relevant laboratory values were discussed with the patient. The following specialty medication dose adjustment(s) are recommended: None today. Patient will try improving diet / lifestyle modifications until next visit.   A1C Last 3 Results          7/27/2023    15:48 11/3/2023    10:35 5/20/2024    13:44   HGBA1C Last 3 Results   Hemoglobin A1C 7.10  6.7  7.3      Lab Results   Component Value Date    HGBA1C 7.3 (A) 05/20/2024     Lab Results   Component Value Date    GLUCOSE 131 (H) 01/23/2024    CALCIUM 9.4 01/23/2024     01/23/2024    K 4.7 01/23/2024    CO2 25.6 01/23/2024     01/23/2024    BUN 23 01/23/2024    CREATININE 1.07 01/23/2024    EGFRIFNONA 61 01/05/2022    BCR 21.5 01/23/2024    ANIONGAP 9.4 01/23/2024     Lab Results   Component Value Date    CHOL 165 01/23/2024    TRIG 74 01/23/2024    HDL 64 (H) 01/23/2024    LDL 87 01/23/2024     Microalbumin          1/23/2024    08:37   Microalbumin   Microalbumin, Urine <1.2      Current Medication List  This medication list has been reviewed with the patient and evaluated for any interactions or necessary modifications/recommendations, and updated to include all prescription medications, OTC medications, and supplements the patient is currently taking.  This list reflects what is contained in the patient's profile, which has also been marked as reviewed to communicate to other providers it is the most up to date version of the patient's current medication therapy.     Current Outpatient Medications:     Accu-Chek FastClix Lancets misc, Use to check blood sugar daily dx e11.65, Disp: 100 each, Rfl: 3    Accu-Chek FastClix Lancets misc, 1 each by Other route Daily., Disp: , Rfl:     Accu-Chek Guide test strip, USE TO TEST BLOOD  SUGAR AS DIRECTED EVERY DAY, Disp: 100 each, Rfl: 3    alfuzosin (UROXATRAL) 10 MG 24 hr tablet, Take 1 tablet by mouth Daily., Disp: 90 tablet, Rfl: 3    ALPRAZolam (XANAX) 0.5 MG tablet, Take 1 tablet by mouth At Night As Needed for Anxiety or Sleep., Disp: 30 tablet, Rfl: 2    aspirin 81 MG tablet, Take 1 tablet by mouth Daily., Disp: , Rfl:     atorvastatin (LIPITOR) 20 MG tablet, Take 1 tablet by mouth Daily., Disp: 90 tablet, Rfl: 1    Cream Base cream, Hydrocortisone 2.5%-nitroglycerin 0.2% ointment: Apply a small amount to the appropriate area twice a day as needed, Disp: 30 g, Rfl: 1    cyclobenzaprine (FLEXERIL) 10 MG tablet, Take 1 tablet by mouth 2 (Two) Times a Day As Needed for Muscle Spasms., Disp: 30 tablet, Rfl: 3    empagliflozin (Jardiance) 25 MG tablet tablet, Take 1 tablet by mouth Daily., Disp: 90 tablet, Rfl: 3    fluconazole (DIFLUCAN) 200 MG tablet, Take 1 tablet by mouth Daily., Disp: 14 tablet, Rfl: 0    glucose blood test strip, USE TO TEST BLOOD SUGAR AS DIRECTED EVERY DAY, Disp: , Rfl:     Hydrocortisone, Perianal, (ANUSOL-HC) 2.5 % rectal cream, Insert  into the rectum 2 (Two) Times a Day., Disp: 30 g, Rfl: 2    metFORMIN (GLUCOPHAGE) 1000 MG tablet, Take 1 tablet by mouth 2 (Two) Times a Day With Meals., Disp: 180 tablet, Rfl: 3    Mirabegron ER (Myrbetriq) 50 MG tablet sustained-release 24 hour 24 hr tablet, Take 1 tablet by mouth Daily., Disp: 30 tablet, Rfl: 5    Multiple Vitamins-Minerals (MULTIVITAMIN ADULTS 50+ PO), Take 1 tablet by mouth Daily., Disp: , Rfl:     ondansetron (ZOFRAN) 4 MG tablet, Take 1 tablet by mouth Every 8 (Eight) Hours As Needed., Disp: , Rfl:     tadalafil (Cialis) 10 MG tablet, Take 1 tablet by mouth Daily., Disp: , Rfl:     Medicines reviewed by Asmita Jorgensen, PharmD on 5/20/2024 at  2:30 PM  Medicines reviewed by Asmita Jorgensen, Cezar on 5/20/2024 at  2:31 PM    Drug Interactions  No Clinically Significant DDIs Were Identified at Present Time Upon  Marking Medications Reviewed    Recommended Medications Assessment  Aspirin: Currently Taking   Statin: Currently Taking   ACEi/ARB: Not Taking Currently    Adverse Drug Reactions  Medication tolerability: Tolerating with no to minimal ADRs  Medication plan: Continue therapy with normal follow-up  Plan for ADR Management: N/a    Adherence, Self-Administration, and Current Therapy Problems  Adherence related to the patient's specialty therapy was discussed with the patient. The Adherence segment of this outreach has been reviewed and updated.     Adherence Questions  Linked Medication(s) Assessed: Empagliflozin  On average, how many doses/injections does the patient miss per month?: 0  What are the identified reasons for non-adherence or missed doses? : no problems identified  What is the estimated medication adherence level?: %  Based on the patient/caregiver response and refill history, does this patient require an MTP to track adherence improvements?: no    Additional Barriers to Patient Self-Administration: None  Methods for Supporting Patient Self-Administration: N/A    Open Medication Therapy Problems  No medication therapy recommendations to display    Goals of Therapy  Goals related to the patient's specialty therapy were discussed with the patient. The Patient Goals segment of this outreach has been reviewed and updated.   Goals Addressed Today        Specialty Pharmacy General Goal      A1C < 7 %     1/4/23: On Enrollment, most recent A1C was 6.7%     5/20/24: A1C 7.3% today, so slightly above goal.  Patient feels diet could be better, will try to make adjustments to lifestyle. Provider agreeable to plan before adding additional therapy, so no changes today.               Quality of Life Assessment   Quality of Life related to the patient's enrollment in the patient management program and services provided was discussed with the patient. The QOL segment of this outreach has been reviewed and  updated.  Quality of Life Improvement Scale: 9-A good deal better    Reassessment Plan & Follow-Up  1. Medication Therapy Changes: None today. Patient will try improving diet / lifestyle modifications until next visit, will reassess A1C and need for therapy changes at that time.   2. Related Plans, Therapy Recommendations, or Issues to Be Addressed: None  3. Pharmacist to perform regular assessments no more than (6) months from the previous assessment.  4. Care Coordinator to set up future refill outreaches, coordinate prescription delivery, and escalate clinical questions to pharmacist.    Attestation  Therapeutic appropriateness: Appropriate   I attest the patient was actively involved in and has agreed to the above plan of care.  If the prescribed therapy is at any point deemed not appropriate based on the current or future assessments, a consultation will be initiated with the patient's specialty care provider to determine the best course of action. The revised plan of therapy will be documented along with any required assessments and/or additional patient education provided.     Asmita Jorgensen, PharmD, BCACP  Clinical Specialty Pharmacist, Endocrinology  5/20/2024  14:39 EDT

## 2024-05-20 NOTE — ASSESSMENT & PLAN NOTE
Diabetes is worsening.  A1c has crept up but still acceptable.  Continue current treatment regimen.  Work on diet.  Diabetes will be reassessed in 6 months.

## 2024-06-28 RX ORDER — LANCETS
EACH MISCELLANEOUS
Qty: 60 EACH | Refills: 0 | Status: SHIPPED | OUTPATIENT
Start: 2024-06-28

## 2024-07-02 ENCOUNTER — SPECIALTY PHARMACY (OUTPATIENT)
Dept: ENDOCRINOLOGY | Facility: CLINIC | Age: 74
End: 2024-07-02
Payer: MEDICARE

## 2024-07-02 NOTE — PROGRESS NOTES
" Specialty Pharmacy Patient Management Program  Endocrinology Refill Outreach      Francisco \"Babak\" is a 73 y.o. male contacted today regarding refills of his medication(s).    Specialty medication(s) and dose(s) confirmed: Jardiance    Refill Questions      Flowsheet Row Most Recent Value   Changes to allergies? No   Changes to medications? No   New conditions or infections since last clinic visit No   Unplanned office visit, urgent care, ED, or hospital admission in the last 4 weeks  No   How does patient/caregiver feel medication is working? Very good   Financial problems or insurance changes  No   Since the previous refill, were any specialty medication doses or scheduled injections missed or delayed?  No   Does this patient require a clinical escalation to a pharmacist? No          Delivery Questions      Flowsheet Row Most Recent Value   Delivery method [employer]   Delivery address verified with patient/caregiver? Yes   Delivery address Home   Number of medications in delivery 2   Medication(s) being filled and delivered Mirabegron, Empagliflozin   Doses left of specialty medications 5 days   Copay verified? Yes   Copay amount $353.47   Copay form of payment Credit/debit on file   Ship Date 07/02   Delivery Date 07/03   Signature Required No            Follow-Up: 60d    Funmi Recio CPhT  Pharmacy Care Coordinator, Endocrinology  7/2/2024  11:36 EDT          "

## 2024-07-29 RX ORDER — ATORVASTATIN CALCIUM 20 MG/1
20 TABLET, FILM COATED ORAL DAILY
Qty: 90 TABLET | Refills: 1 | Status: SHIPPED | OUTPATIENT
Start: 2024-07-29

## 2024-08-14 ENCOUNTER — SPECIALTY PHARMACY (OUTPATIENT)
Age: 74
End: 2024-08-14
Payer: MEDICARE

## 2024-08-14 ENCOUNTER — OFFICE VISIT (OUTPATIENT)
Dept: INTERNAL MEDICINE | Facility: CLINIC | Age: 74
End: 2024-08-14
Payer: MEDICARE

## 2024-08-14 VITALS
HEART RATE: 72 BPM | DIASTOLIC BLOOD PRESSURE: 58 MMHG | WEIGHT: 175.2 LBS | BODY MASS INDEX: 23.73 KG/M2 | SYSTOLIC BLOOD PRESSURE: 100 MMHG | HEIGHT: 72 IN | TEMPERATURE: 98.2 F

## 2024-08-14 DIAGNOSIS — E78.2 MIXED HYPERLIPIDEMIA: Primary | Chronic | ICD-10-CM

## 2024-08-14 DIAGNOSIS — J30.1 SEASONAL ALLERGIC RHINITIS DUE TO POLLEN: ICD-10-CM

## 2024-08-14 DIAGNOSIS — F41.1 ANXIETY STATE: ICD-10-CM

## 2024-08-14 DIAGNOSIS — R20.2 PARESTHESIAS: ICD-10-CM

## 2024-08-14 PROCEDURE — 3051F HG A1C>EQUAL 7.0%<8.0%: CPT | Performed by: INTERNAL MEDICINE

## 2024-08-14 PROCEDURE — 1159F MED LIST DOCD IN RCRD: CPT | Performed by: INTERNAL MEDICINE

## 2024-08-14 PROCEDURE — G2211 COMPLEX E/M VISIT ADD ON: HCPCS | Performed by: INTERNAL MEDICINE

## 2024-08-14 PROCEDURE — 1160F RVW MEDS BY RX/DR IN RCRD: CPT | Performed by: INTERNAL MEDICINE

## 2024-08-14 PROCEDURE — 99214 OFFICE O/P EST MOD 30 MIN: CPT | Performed by: INTERNAL MEDICINE

## 2024-08-14 PROCEDURE — 1125F AMNT PAIN NOTED PAIN PRSNT: CPT | Performed by: INTERNAL MEDICINE

## 2024-08-14 NOTE — PROGRESS NOTES
[facility]     Francisco Gonzalez  1950   [id number]      Patient Care Team:  Newton Cruz MD as PCP - General (Internal Medicine)  Gisselle Izaguirre PA-C as Physician Assistant (Internal Medicine)  Honey Phillips PA as Physician Assistant (Endocrinology)  Lauren Ramos MD as Consulting Physician (Cardiology)    Chief Complaint::   Chief Complaint   Patient presents with    Diabetes     6 mo. Follow up    Pin pricks in legs     X's a couple of month's        HPI  History of Present Illness  The patient is a 73-year-old male who comes in for follow-up of hyperlipidemia and allergic rhinitis. He sees endocrinology for diabetes.    He reports overall good health, but has been experiencing sensations akin to pinpricks in his legs. This symptom began approximately 2 to 3 months ago and was quite frequent for the first 2 to 3 weeks, but has since become less common. The sensation does not disrupt his sleep and he does not experience it in his feet. He also mentions that his blood sugar levels do not increase with exercise.      Chronic Conditions:      Patient Active Problem List   Diagnosis    Mixed hyperlipidemia    Asthma    Sensorineural hearing loss (SNHL) of both ears    Allergic rhinitis    Type 2 diabetes mellitus with hyperglycemia, without long-term current use of insulin    Hypertrophy of prostate without urinary obstruction    Anxiety state    Annual physical exam    Lymphadenopathy        Past Medical History:   Diagnosis Date    Anxiety     Benign prostatic hyperplasia 2014    Chicken pox     Colonic polyp     Erectile dysfunction     Essential hypertension     HL (hearing loss) 2016    Hydrocele 2001    left; repaired    Hyperlipidemia     Measles     Multiple endocrine neoplasia     Quadriceps tendon rupture 2010    Type 2 diabetes mellitus        Past Surgical History:   Procedure Laterality Date    APPENDECTOMY  1986    COLONOSCOPY  02/2022    HYDROCELE  EXCISION / REPAIR Left 2001    KNEE SURGERY      left knee    PROSTATE SURGERY  06/2023    Urolift    QUADRICEPS TENDON REPAIR  2010    left quadracepts tendon rupture    VASECTOMY         Family History   Problem Relation Age of Onset    Hypertension Mother     Hearing loss Mother     Heart attack Father         MI    Coronary artery disease Father 59        premature    No Known Problems Sister     Hyperlipidemia Brother        Social History     Socioeconomic History    Marital status:    Tobacco Use    Smoking status: Never     Passive exposure: Past    Smokeless tobacco: Never   Vaping Use    Vaping status: Never Used   Substance and Sexual Activity    Alcohol use: Yes     Alcohol/week: 1.0 standard drink of alcohol     Types: 1 Cans of beer per week    Drug use: No    Sexual activity: Yes     Partners: Female     Birth control/protection: Vasectomy       No Known Allergies      Current Outpatient Medications:     Accu-Chek FastClix Lancets misc, Use to check blood sugar daily dx e11.65, Disp: 100 each, Rfl: 3    Accu-Chek FastClix Lancets misc, 1 each by Other route Daily., Disp: , Rfl:     Accu-Chek FastClix Lancets misc, USE TO CHECK BLOOD SUGAR EVERY DAY, Disp: 60 each, Rfl: 0    Accu-Chek Guide test strip, USE TO TEST BLOOD SUGAR AS DIRECTED EVERY DAY, Disp: 100 each, Rfl: 3    alfuzosin (UROXATRAL) 10 MG 24 hr tablet, Take 1 tablet by mouth Daily., Disp: 90 tablet, Rfl: 3    ALPRAZolam (XANAX) 0.5 MG tablet, Take 1 tablet by mouth At Night As Needed for Anxiety or Sleep., Disp: 30 tablet, Rfl: 2    aspirin 81 MG tablet, Take 1 tablet by mouth Daily., Disp: , Rfl:     atorvastatin (LIPITOR) 20 MG tablet, Take 1 tablet by mouth Daily., Disp: 90 tablet, Rfl: 1    Cream Base cream, Hydrocortisone 2.5%-nitroglycerin 0.2% ointment: Apply a small amount to the appropriate area twice a day as needed, Disp: 30 g, Rfl: 1    cyclobenzaprine (FLEXERIL) 10 MG tablet, Take 1 tablet by mouth 2 (Two) Times a Day  "As Needed for Muscle Spasms., Disp: 30 tablet, Rfl: 3    empagliflozin (Jardiance) 25 MG tablet tablet, Take 1 tablet by mouth Daily., Disp: 90 tablet, Rfl: 3    fluconazole (DIFLUCAN) 200 MG tablet, Take 1 tablet by mouth Daily., Disp: 14 tablet, Rfl: 0    glucose blood test strip, USE TO TEST BLOOD SUGAR AS DIRECTED EVERY DAY, Disp: , Rfl:     Hydrocortisone, Perianal, (ANUSOL-HC) 2.5 % rectal cream, Insert  into the rectum 2 (Two) Times a Day., Disp: 30 g, Rfl: 2    metFORMIN (GLUCOPHAGE) 1000 MG tablet, Take 1 tablet by mouth 2 (Two) Times a Day With Meals., Disp: 180 tablet, Rfl: 3    Mirabegron ER (Myrbetriq) 50 MG tablet sustained-release 24 hour 24 hr tablet, Take 1 tablet by mouth Daily., Disp: 30 tablet, Rfl: 5    Multiple Vitamins-Minerals (MULTIVITAMIN ADULTS 50+ PO), Take 1 tablet by mouth Daily., Disp: , Rfl:     ondansetron (ZOFRAN) 4 MG tablet, Take 1 tablet by mouth Every 8 (Eight) Hours As Needed., Disp: , Rfl:     tadalafil (Cialis) 10 MG tablet, Take 1 tablet by mouth Daily., Disp: , Rfl:     Review of Systems   Constitutional: Negative.    Respiratory: Negative.  Negative for chest tightness and shortness of breath.    Cardiovascular: Negative.  Negative for chest pain.   Gastrointestinal:  Negative for abdominal pain, blood in stool, constipation and diarrhea.        Vital Signs  Vitals:    08/14/24 1232   BP: 100/58   BP Location: Left arm   Patient Position: Sitting   Cuff Size: Adult   Pulse: 72   Temp: 98.2 °F (36.8 °C)   TempSrc: Temporal   Weight: 79.5 kg (175 lb 3.2 oz)   Height: 182.5 cm (71.85\")       Physical Exam  Vitals reviewed.   Constitutional:       Appearance: Normal appearance. He is well-developed.   HENT:      Head: Normocephalic and atraumatic.   Neck:      Thyroid: No thyromegaly.      Vascular: No carotid bruit.   Cardiovascular:      Rate and Rhythm: Normal rate and regular rhythm.      Pulses:           Dorsalis pedis pulses are 1+ on the right side and 1+ on the left " side.      Heart sounds: Normal heart sounds. No murmur heard.     No friction rub. No gallop.   Pulmonary:      Effort: Pulmonary effort is normal.      Breath sounds: Normal breath sounds.   Musculoskeletal:      Cervical back: Normal range of motion and neck supple.      Right lower leg: No edema.      Left lower leg: No edema.      Right foot: No deformity.      Left foot: No deformity.   Feet:      Right foot:      Protective Sensation: 5 sites tested.  5 sites sensed.      Skin integrity: Skin integrity normal.      Left foot:      Protective Sensation: 5 sites tested.  5 sites sensed.      Skin integrity: Skin integrity normal.      Comments: Sensation in both feet intact to monofilament.     Diabetic Foot Exam Performed and Monofilament Test Performed    Lymphadenopathy:      Cervical: No cervical adenopathy.   Skin:     General: Skin is warm and dry.   Neurological:      Mental Status: He is alert and oriented to person, place, and time.      Cranial Nerves: No cranial nerve deficit.   Psychiatric:         Mood and Affect: Mood normal.         Behavior: Behavior normal.        Physical Exam      Procedures    ACE III MINI        Results  Laboratory Studies  Last A1c is 7.3.           Assessment/Plan:    Diagnoses and all orders for this visit:    1. Mixed hyperlipidemia (Primary)    2. Seasonal allergic rhinitis due to pollen    3. Anxiety state    4. Paresthesias      Assessment & Plan  1. Hyperlipidemia.  His hyperlipidemia is well controlled with atorvastatin and a healthy diet.    2. Diabetic Neuropathy.  He reports mild, symmetric sensations of pinpricks in the lower extremities, which is highly suggestive of diabetic neuropathy. No sensory loss is observed. Continued monitoring of blood sugar levels is advised. He is recommended to discuss the possibility of using a continuous glucose monitor (CGM) with his endocrinologist to better track blood sugar fluctuations.    3. Allergic Rhinitis.  His  allergic rhinitis is well controlled with over-the-counter medications.    4. Keratosis.  He has a keratosis on his leg. A referral to a dermatologist is recommended for further evaluation, although it is not urgent.        Plan of care reviewed with patient at the conclusion of today's visit. Education was provided regarding diagnosis, management, and any prescribed or recommended OTC medications.Patient verbalizes understanding of and agreement with management plan.     Patient or patient representative verbalized consent for the use of Ambient Listening during the visit with  Newton Cruz MD for chart documentation. 8/18/2024  17:57 EDT        Newton Cruz MD

## 2024-08-21 ENCOUNTER — SPECIALTY PHARMACY (OUTPATIENT)
Dept: ENDOCRINOLOGY | Facility: CLINIC | Age: 74
End: 2024-08-21
Payer: MEDICARE

## 2024-08-21 ENCOUNTER — OFFICE VISIT (OUTPATIENT)
Dept: ENDOCRINOLOGY | Facility: CLINIC | Age: 74
End: 2024-08-21
Payer: MEDICARE

## 2024-08-21 VITALS
BODY MASS INDEX: 23.98 KG/M2 | SYSTOLIC BLOOD PRESSURE: 118 MMHG | HEART RATE: 75 BPM | OXYGEN SATURATION: 100 % | WEIGHT: 177 LBS | DIASTOLIC BLOOD PRESSURE: 64 MMHG | HEIGHT: 72 IN

## 2024-08-21 DIAGNOSIS — E11.65 TYPE 2 DIABETES MELLITUS WITH HYPERGLYCEMIA, WITHOUT LONG-TERM CURRENT USE OF INSULIN: Primary | ICD-10-CM

## 2024-08-21 LAB
EXPIRATION DATE: ABNORMAL
EXPIRATION DATE: ABNORMAL
GLUCOSE BLDC GLUCOMTR-MCNC: 152 MG/DL (ref 70–130)
HBA1C MFR BLD: 7.4 % (ref 4.5–5.7)
Lab: ABNORMAL
Lab: ABNORMAL

## 2024-08-21 PROCEDURE — 82947 ASSAY GLUCOSE BLOOD QUANT: CPT | Performed by: PHYSICIAN ASSISTANT

## 2024-08-21 PROCEDURE — 99213 OFFICE O/P EST LOW 20 MIN: CPT | Performed by: PHYSICIAN ASSISTANT

## 2024-08-21 PROCEDURE — 3051F HG A1C>EQUAL 7.0%<8.0%: CPT | Performed by: PHYSICIAN ASSISTANT

## 2024-08-21 PROCEDURE — 83036 HEMOGLOBIN GLYCOSYLATED A1C: CPT | Performed by: PHYSICIAN ASSISTANT

## 2024-08-21 NOTE — PROGRESS NOTES
"     Office Note      Date: 2024  Patient Name: Francisco Gonzalez  MRN: [id number]  : 1950    Chief Complaint   Patient presents with    Diabetes       History of Present Illness:   Francisco Gonzalez is a 73 y.o. male who presents for Diabetes type 2.   Diagnosed:   Current RX: jardiance 25 mg, metformin 1000 mg BID,     Bg checks are done: once a day, checks it about 12:30  Hypoglycemia :none    Pt was on vacation a couple weeks ago, admits to some dietary indiscretions.    He has been increasing his exercise, does not see consistent improvement in glucose after exercise. Wonders about using a CGM to get a better idea of glucose fluctuations throughout the day.      Last A1c:  Hemoglobin A1C   Date Value Ref Range Status   2024 7.4 (A) 4.5 - 5.7 % Final   2023 7.10 (H) 4.80 - 5.60 % Final       Changes in health since last visit: none.     DM Health Maintenance:  Ophtho: 23  Monofilament / Foot exam: 24  Lipids/Statin: taking a statin with last FLP showing LDL 87  PETRONA: 24  TSH: 24  Aspirin: taking  ACE/ARB: no     Subjective     Diabetic Complications:  Eyes: No  Kidneys: No  Feet: No  Heart: No         Review of Systems:   Review of Systems   Constitutional:  Negative for activity change, appetite change and fatigue.   Respiratory:  Negative for chest tightness and shortness of breath.    Gastrointestinal:  Negative for abdominal pain.   Musculoskeletal:  Negative for myalgias.   Neurological:  Negative for numbness.   Psychiatric/Behavioral:  The patient is not nervous/anxious.        The following portions of the patient's history were reviewed and updated as appropriate: allergies, current medications, past family history, past medical history, past social history, past surgical history, and problem list.    Objective     Visit Vitals  /64 (BP Location: Left arm, Patient Position: Sitting, Cuff Size: Adult)   Pulse 75   Ht 182.9 cm (72\")   Wt 80.3 kg " (177 lb)   SpO2 100%   BMI 24.01 kg/m²           Physical Exam:  Physical Exam  Constitutional:       Appearance: He is well-developed.   HENT:      Head: Normocephalic and atraumatic.      Right Ear: External ear normal.      Left Ear: External ear normal.   Eyes:      Conjunctiva/sclera: Conjunctivae normal.   Cardiovascular:      Rate and Rhythm: Normal rate and regular rhythm.   Pulmonary:      Effort: Pulmonary effort is normal.      Breath sounds: Normal breath sounds.   Musculoskeletal:         General: Normal range of motion.      Cervical back: Normal range of motion.   Skin:     General: Skin is warm and dry.   Psychiatric:         Behavior: Behavior normal.          Assessment / Plan      Assessment & Plan:  Diagnoses and all orders for this visit:    1. Type 2 diabetes mellitus with hyperglycemia, without long-term current use of insulin (Primary)  Assessment & Plan:  Diabetes is worsening.   Continue current treatment regimen.  Reminded to bring in blood sugar diary at next visit.  Recommended an ADA diet.  Regular aerobic exercise.  Reminded to get yearly retinal exam.    Started patient on sample of freestyle elizabeth 3 in office today.  He will use this for the next 2 weeks to monitor glucose fluctuations and determine if there are any patterns.  He also plans to pay attention to diet and see if what impact this is having.  He can consider paying out-of-pocket for the elizabeth 3 if he wants to continue CGM.    Continue current medications, Jardiance 25 mg, metformin at 1000 mg twice daily.    Diabetes will be reassessed in 3 months    Orders:  -     POC Glucose, Blood  -     POC Glycosylated Hemoglobin (Hb A1C)        Return in about 3 months (around 11/21/2024) for Follow up.    Portions of this note were completed with voice recognition program.  Electronically signed by Shawanda Roca PA-C  [facility]  08/21/2024

## 2024-08-21 NOTE — PROGRESS NOTES
Specialty Pharmacy Patient Management Program  Endocrinology Reassessment     Francisco Gonzalez was referred by an Endocrinology provider to the Endocrinology Patient Management program offered by [facility] for Type 2 Diabetes. A follow-up outreach was conducted, including assessment of continued therapy appropriateness, medication adherence, and side effect incidence and management for Jardiance.    Changes to Insurance Coverage or Financial Support  None    Relevant Past Medical History and Comorbidities  Relevant medical history and concomitant health conditions were discussed with the patient. The patient's chart has been reviewed for relevant past medical history and comorbid health conditions and updated as necessary.   Past Medical History:   Diagnosis Date    Anxiety     Benign prostatic hyperplasia 2014    Chicken pox     Colonic polyp     Erectile dysfunction     Essential hypertension     HL (hearing loss) 2016    Hydrocele 2001    left; repaired    Hyperlipidemia     Measles     Multiple endocrine neoplasia     Quadriceps tendon rupture 2010    Type 2 diabetes mellitus      Social History     Socioeconomic History    Marital status:    Tobacco Use    Smoking status: Never     Passive exposure: Past    Smokeless tobacco: Never   Vaping Use    Vaping status: Never Used   Substance and Sexual Activity    Alcohol use: Yes     Alcohol/week: 1.0 standard drink of alcohol     Types: 1 Cans of beer per week    Drug use: No    Sexual activity: Yes     Partners: Female     Birth control/protection: Vasectomy     Problem list reviewed by Radha Villa, PharmD on 8/21/2024 at  2:08 PM    Hospitalizations and Urgent Care Since Last Assessment  ED Visits, Admissions, or Hospitalizations: None  Urgent Office Visits: None    Allergies  Known allergies and reactions were discussed with the patient. The patient's chart has been reviewed for allergy information and updated as necessary.    No Known Allergies  Allergies reviewed by Radha Villa PharmD on 8/21/2024 at  2:08 PM    Relevant Laboratory Values  Relevant laboratory values were discussed with the patient. The following specialty medication dose adjustment(s) are recommended: No changes  A1C Last 3 Results          11/3/2023    10:35 5/20/2024    13:44 8/21/2024    13:45   HGBA1C Last 3 Results   Hemoglobin A1C 6.7  7.3  7.4      Lab Results   Component Value Date    HGBA1C 7.4 (A) 08/21/2024     Lab Results   Component Value Date    GLUCOSE 131 (H) 01/23/2024    CALCIUM 9.4 01/23/2024     01/23/2024    K 4.7 01/23/2024    CO2 25.6 01/23/2024     01/23/2024    BUN 23 01/23/2024    CREATININE 1.07 01/23/2024    EGFRIFNONA 61 01/05/2022    BCR 21.5 01/23/2024    ANIONGAP 9.4 01/23/2024     Lab Results   Component Value Date    CHOL 165 01/23/2024    TRIG 74 01/23/2024    HDL 64 (H) 01/23/2024    LDL 87 01/23/2024     Microalbumin          1/23/2024    08:37   Microalbumin   Microalbumin, Urine <1.2      Current Medication List  This medication list has been reviewed with the patient and evaluated for any interactions or necessary modifications/recommendations, and updated to include all prescription medications, OTC medications, and supplements the patient is currently taking.  This list reflects what is contained in the patient's profile, which has also been marked as reviewed to communicate to other providers it is the most up to date version of the patient's current medication therapy.     Current Outpatient Medications:     Accu-Chek FastClix Lancets misc, Use to check blood sugar daily dx e11.65, Disp: 100 each, Rfl: 3    Accu-Chek FastClix Lancets misc, 1 each by Other route Daily., Disp: , Rfl:     Accu-Chek FastClix Lancets misc, USE TO CHECK BLOOD SUGAR EVERY DAY, Disp: 60 each, Rfl: 0    Accu-Chek Guide test strip, USE TO TEST BLOOD SUGAR AS DIRECTED EVERY DAY, Disp: 100 each, Rfl: 3    alfuzosin (UROXATRAL) 10 MG 24  hr tablet, Take 1 tablet by mouth Daily., Disp: 90 tablet, Rfl: 3    ALPRAZolam (XANAX) 0.5 MG tablet, Take 1 tablet by mouth At Night As Needed for Anxiety or Sleep., Disp: 30 tablet, Rfl: 2    aspirin 81 MG tablet, Take 1 tablet by mouth Daily., Disp: , Rfl:     atorvastatin (LIPITOR) 20 MG tablet, Take 1 tablet by mouth Daily., Disp: 90 tablet, Rfl: 1    Cream Base cream, Hydrocortisone 2.5%-nitroglycerin 0.2% ointment: Apply a small amount to the appropriate area twice a day as needed, Disp: 30 g, Rfl: 1    cyclobenzaprine (FLEXERIL) 10 MG tablet, Take 1 tablet by mouth 2 (Two) Times a Day As Needed for Muscle Spasms., Disp: 30 tablet, Rfl: 3    empagliflozin (Jardiance) 25 MG tablet tablet, Take 1 tablet by mouth Daily., Disp: 90 tablet, Rfl: 3    fluconazole (DIFLUCAN) 200 MG tablet, Take 1 tablet by mouth Daily., Disp: 14 tablet, Rfl: 0    glucose blood test strip, USE TO TEST BLOOD SUGAR AS DIRECTED EVERY DAY, Disp: , Rfl:     Hydrocortisone, Perianal, (ANUSOL-HC) 2.5 % rectal cream, Insert  into the rectum 2 (Two) Times a Day., Disp: 30 g, Rfl: 2    metFORMIN (GLUCOPHAGE) 1000 MG tablet, Take 1 tablet by mouth 2 (Two) Times a Day With Meals., Disp: 180 tablet, Rfl: 3    Mirabegron ER (Myrbetriq) 50 MG tablet sustained-release 24 hour 24 hr tablet, Take 1 tablet by mouth Daily., Disp: 30 tablet, Rfl: 5    Multiple Vitamins-Minerals (MULTIVITAMIN ADULTS 50+ PO), Take 1 tablet by mouth Daily., Disp: , Rfl:     ondansetron (ZOFRAN) 4 MG tablet, Take 1 tablet by mouth Every 8 (Eight) Hours As Needed., Disp: , Rfl:     tadalafil (Cialis) 10 MG tablet, Take 1 tablet by mouth Daily., Disp: , Rfl:     Medicines reviewed by Radha Villa, PharmD on 8/21/2024 at  2:08 PM    Drug Interactions  No Clinically Significant DDIs Were Identified at Present Time Upon Marking Medications Reviewed      Recommended Medications Assessment  Aspirin: Currently Taking   Statin: Currently Taking   ACEi/ARB: Currently Taking      Adverse Drug Reactions  Medication tolerability: Tolerating with no to minimal ADRs  Medication plan: Continue therapy with normal follow-up  Plan for ADR Management: None    Adherence, Self-Administration, and Current Therapy Problems  Adherence related to the patient's specialty therapy was discussed with the patient. The Adherence segment of this outreach has been reviewed and updated.     Adherence Questions  Linked Medication(s) Assessed: Empagliflozin  On average, how many doses/injections does the patient miss per month?: 0  What are the identified reasons for non-adherence or missed doses? : no problems identified  What is the estimated medication adherence level?: %  Based on the patient/caregiver response and refill history, does this patient require an MTP to track adherence improvements?: no    Additional Barriers to Patient Self-Administration: None  Methods for Supporting Patient Self-Administration: None    Open Medication Therapy Problems  No medication therapy recommendations to display    Goals of Therapy  Goals related to the patient's specialty therapy were discussed with the patient. The Patient Goals segment of this outreach has been reviewed and updated.   Goals Addressed Today        Specialty Pharmacy General Goal      A1C < 7 %     1/4/23: On Enrollment, most recent A1C was 6.7%     5/20/24: A1C 7.3% today, so slightly above goal.  Patient feels diet could be better, will try to make adjustments to lifestyle. Provider agreeable to plan before adding additional therapy, so no changes today.     8/21/24: A1c 7.4% today, slightly above goal. Planning to use a Edyta for a couple of weeks to see when the glucoses are elevated. No changes today. MS              Quality of Life Assessment   Quality of Life related to the patient's enrollment in the patient management program and services provided was discussed with the patient. The QOL segment of this outreach has been reviewed and  updated.  Quality of Life Improvement Scale: 8-Moderately better    Reassessment Plan & Follow-Up  1. Medication Therapy Changes: No changes today. Patient to wear a Freestyle Edyta to determine when hyperglycemia is occurring.   2. Related Plans, Therapy Recommendations, or Issues to Be Addressed: None.  3. Pharmacist to perform regular assessments no more than (6) months from the previous assessment.  4. Care Coordinator to set up future refill outreaches, coordinate prescription delivery, and escalate clinical questions to pharmacist.    Attestation  Therapeutic appropriateness: Appropriate   I attest the patient was actively involved in and has agreed to the above plan of care.  If the prescribed therapy is at any point deemed not appropriate based on the current or future assessments, a consultation will be initiated with the patient's specialty care provider to determine the best course of action. The revised plan of therapy will be documented along with any required assessments and/or additional patient education provided.     Radha Villa, PharmD, BCCCP, BCPS  Clinical Specialty Pharmacist, Endocrinology  8/21/2024  14:10 EDT

## 2024-08-22 NOTE — ASSESSMENT & PLAN NOTE
Diabetes is worsening.   Continue current treatment regimen.  Reminded to bring in blood sugar diary at next visit.  Recommended an ADA diet.  Regular aerobic exercise.  Reminded to get yearly retinal exam.    Started patient on sample of freestyle elizabeth 3 in office today.  He will use this for the next 2 weeks to monitor glucose fluctuations and determine if there are any patterns.  He also plans to pay attention to diet and see if what impact this is having.  He can consider paying out-of-pocket for the elizabeth 3 if he wants to continue CGM.    Continue current medications, Jardiance 25 mg, metformin at 1000 mg twice daily.    Diabetes will be reassessed in 3 months

## 2024-09-04 ENCOUNTER — PATIENT MESSAGE (OUTPATIENT)
Dept: ENDOCRINOLOGY | Facility: CLINIC | Age: 74
End: 2024-09-04
Payer: MEDICARE

## 2024-09-05 ENCOUNTER — TELEPHONE (OUTPATIENT)
Dept: ENDOCRINOLOGY | Facility: CLINIC | Age: 74
End: 2024-09-05
Payer: MEDICARE

## 2024-09-05 RX ORDER — BLOOD-GLUCOSE SENSOR
1 EACH MISCELLANEOUS TAKE AS DIRECTED
Qty: 6 EACH | Refills: 3 | Status: SHIPPED | OUTPATIENT
Start: 2024-09-05

## 2024-09-05 NOTE — TELEPHONE ENCOUNTER
Specialty Pharmacy Patient Management Program  Prescription Refill Request     Patient currently fills medications at  Pharmacy. Needing refill(s) on the following:      Requested Prescriptions     Pending Prescriptions Disp Refills    Continuous Glucose Sensor (FreeStyle Edyta 3 Plus Sensor) misc 6 each 3     Sig: Use 1 each Take As Directed. Use one sensor every 15 days.     Pended for endocrinology provider, Shawanda Roca PA-C, to review and approve if appropriate.     Patient requesting RX and is aware of insurance copays with this.     Last Office Visit: 8/21/24  Next Office Visit: 10/7/24    Radha Villa, Pharm.D. BCPS, BCCCP  Clinical Specialty Pharmacist, Endocrinology   10:14 EDT  09/05/24

## 2024-09-06 ENCOUNTER — SPECIALTY PHARMACY (OUTPATIENT)
Dept: ENDOCRINOLOGY | Facility: CLINIC | Age: 74
End: 2024-09-06
Payer: MEDICARE

## 2024-09-06 NOTE — PROGRESS NOTES
" Specialty Pharmacy Patient Management Program  Endocrinology Refill Outreach      Francisco \"Babak\" is a 73 y.o. male contacted today regarding refills of his medication(s).    Specialty medication(s) and dose(s) confirmed: jardiance    Refill Questions      Flowsheet Row Most Recent Value   Changes to allergies? No   Changes to medications? No   New conditions or infections since last clinic visit No   Unplanned office visit, urgent care, ED, or hospital admission in the last 4 weeks  No   How does patient/caregiver feel medication is working? Very good   Financial problems or insurance changes  No   Since the previous refill, were any specialty medication doses or scheduled injections missed or delayed?  No   Does this patient require a clinical escalation to a pharmacist? No          Delivery Questions      Flowsheet Row Most Recent Value   Delivery method  at Pharmacy   Number of medications in delivery 3   Medication(s) being filled and delivered Continuous Glucose Sensor, Empagliflozin, Mirabegron   Doses left of specialty medications 2 days   Copay verified? Yes   Copay amount 391.68   Copay form of payment Pay at pickup            Follow-Up: 60d    Funmi Recio CPhT  Pharmacy Care Coordinator, Endocrinology  9/6/2024  16:19 EDT          "

## 2024-09-12 ENCOUNTER — DOCUMENTATION (OUTPATIENT)
Dept: ENDOCRINOLOGY | Facility: CLINIC | Age: 74
End: 2024-09-12
Payer: MEDICARE

## 2024-10-02 RX ORDER — TADALAFIL 10 MG/1
10 TABLET ORAL DAILY
Qty: 30 TABLET | Refills: 5 | Status: SHIPPED | OUTPATIENT
Start: 2024-10-02

## 2024-10-15 ENCOUNTER — TELEPHONE (OUTPATIENT)
Dept: CARDIOLOGY | Facility: CLINIC | Age: 74
End: 2024-10-15
Payer: MEDICARE

## 2024-10-15 NOTE — TELEPHONE ENCOUNTER
Spoke with patient.  He reports walking on the treadmill for 30 minutes, he goes from 9 degrees to 11 degrees and from 2mph to 2.8mph.  This does not cause any problems.  But, he states when he lifts weights, he feels his heart beat faster.  He has attempted to check his heart rate and has been unsuccessful.  He is instructed to check his heart rate at his carotid for one full minute and record.  The patient has an appointment on 11/6/24.  We will try and move this up.  He verbalizes understanding.

## 2024-10-15 NOTE — TELEPHONE ENCOUNTER
"  Caller: Francisco Gonzalez \"Babak\"    Relationship: Self    Best call back number: [phone]    What is the best time to reach you: ANYTIME    What was the call regarding: PT WOULD LIKE TO SEE IF HE CAN MOVE APPT UP SOONER DUE TO SOB ON EXERTION AND DOESN'T KNOW IF HE MAY NEED AN EKG AS WELL. ALSO WILL TO SEE APRN IF THERE IS SOONER APPT. PLEASE CALL BACK TO ADVISE, THANK YOU,      "

## 2024-10-18 ENCOUNTER — OFFICE VISIT (OUTPATIENT)
Dept: INTERNAL MEDICINE | Facility: CLINIC | Age: 74
End: 2024-10-18
Payer: MEDICARE

## 2024-10-18 ENCOUNTER — LAB (OUTPATIENT)
Dept: LAB | Facility: HOSPITAL | Age: 74
End: 2024-10-18
Payer: MEDICARE

## 2024-10-18 VITALS
OXYGEN SATURATION: 94 % | BODY MASS INDEX: 23.19 KG/M2 | WEIGHT: 171.2 LBS | TEMPERATURE: 98 F | HEIGHT: 72 IN | DIASTOLIC BLOOD PRESSURE: 70 MMHG | HEART RATE: 83 BPM | SYSTOLIC BLOOD PRESSURE: 130 MMHG

## 2024-10-18 DIAGNOSIS — R10.9 ABDOMINAL DISCOMFORT: ICD-10-CM

## 2024-10-18 DIAGNOSIS — R00.0 TACHYCARDIA: ICD-10-CM

## 2024-10-18 DIAGNOSIS — E78.2 MIXED HYPERLIPIDEMIA: ICD-10-CM

## 2024-10-18 DIAGNOSIS — R00.0 TACHYCARDIA: Primary | ICD-10-CM

## 2024-10-18 LAB
BASOPHILS # BLD AUTO: 0.03 10*3/MM3 (ref 0–0.2)
BASOPHILS NFR BLD AUTO: 0.6 % (ref 0–1.5)
DEPRECATED RDW RBC AUTO: 43 FL (ref 37–54)
EOSINOPHIL # BLD AUTO: 0.03 10*3/MM3 (ref 0–0.4)
EOSINOPHIL NFR BLD AUTO: 0.6 % (ref 0.3–6.2)
ERYTHROCYTE [DISTWIDTH] IN BLOOD BY AUTOMATED COUNT: 12.4 % (ref 12.3–15.4)
HCT VFR BLD AUTO: 44.5 % (ref 37.5–51)
HGB BLD-MCNC: 15.2 G/DL (ref 13–17.7)
IMM GRANULOCYTES # BLD AUTO: 0.01 10*3/MM3 (ref 0–0.05)
IMM GRANULOCYTES NFR BLD AUTO: 0.2 % (ref 0–0.5)
LYMPHOCYTES # BLD AUTO: 0.54 10*3/MM3 (ref 0.7–3.1)
LYMPHOCYTES NFR BLD AUTO: 10.6 % (ref 19.6–45.3)
MCH RBC QN AUTO: 32.5 PG (ref 26.6–33)
MCHC RBC AUTO-ENTMCNC: 34.2 G/DL (ref 31.5–35.7)
MCV RBC AUTO: 95.3 FL (ref 79–97)
MONOCYTES # BLD AUTO: 0.31 10*3/MM3 (ref 0.1–0.9)
MONOCYTES NFR BLD AUTO: 6.1 % (ref 5–12)
NEUTROPHILS NFR BLD AUTO: 4.16 10*3/MM3 (ref 1.7–7)
NEUTROPHILS NFR BLD AUTO: 81.9 % (ref 42.7–76)
NRBC BLD AUTO-RTO: 0 /100 WBC (ref 0–0.2)
PLATELET # BLD AUTO: 211 10*3/MM3 (ref 140–450)
PMV BLD AUTO: 10.9 FL (ref 6–12)
RBC # BLD AUTO: 4.67 10*6/MM3 (ref 4.14–5.8)
WBC NRBC COR # BLD AUTO: 5.08 10*3/MM3 (ref 3.4–10.8)

## 2024-10-18 PROCEDURE — 80061 LIPID PANEL: CPT

## 2024-10-18 PROCEDURE — 80053 COMPREHEN METABOLIC PANEL: CPT

## 2024-10-18 PROCEDURE — 85025 COMPLETE CBC W/AUTO DIFF WBC: CPT

## 2024-10-18 NOTE — PROGRESS NOTES
"    Acute Office Visit      Name: Francisco Gonzalez    : 1950     MRN: [id number]   Care Team: Patient Care Team:  Newton Cruz MD as PCP - General (Internal Medicine)  Gisselle Izaguirre PA-C as Physician Assistant (Internal Medicine)  Honey Phillips PA as Physician Assistant (Endocrinology)  Lauren Ramos MD as Consulting Physician (Cardiology)    Chief Complaint  Rapid Heart Rate and Abdominal discomfort    Subjective     History of Present Illness:    Babak is a pleasant 73-year-old male who comes to the office today with concerns for an elevated heart rate and abdominal discomfort.    He states that approximately 5 weeks ago, he changed his workout routine at the gym and started noticing an abnormal increasing heart rate.  He reports that he can feel his heart racing more than usual.  During this time, he does get short of breath however attributes to the change in exercise.  He is completing approximately 1 hour worth of weight training and 30 minutes on the treadmill with increasing elevation in speed approximately 4 times per week.    He also notes that he began to feel a burning or inflamed feeling to his epigastric area.  He denies pain however, states that it is an uncomfortable \"awareness\" that has not been present in the past.    He does note that both of these changes occurred about the same time.    Babak denies chest pain, changes in shortness of breath, vision changes, headaches.  He denies any changes to his diet or medications recently.  He denies any history of smoking.    He does state that he is acutely aware of changes in his body as he concentrates on being as healthy as he can.    Past Medical History:   Diagnosis Date    Anxiety     Benign prostatic hyperplasia     Chicken pox     Colonic polyp     Erectile dysfunction     Essential hypertension     HL (hearing loss) 2016    Hydrocele 2001    left; repaired    Hyperlipidemia     Measles     Multiple " endocrine neoplasia     Quadriceps tendon rupture 2010    Type 2 diabetes mellitus        Past Surgical History:   Procedure Laterality Date    APPENDECTOMY  1986    COLONOSCOPY  02/2022    HYDROCELE EXCISION / REPAIR Left 2001    KNEE SURGERY      left knee    PROSTATE SURGERY  06/2023    Urolift    QUADRICEPS TENDON REPAIR  2010    left quadracepts tendon rupture    VASECTOMY         Social History     Socioeconomic History    Marital status:    Tobacco Use    Smoking status: Never     Passive exposure: Past    Smokeless tobacco: Never   Vaping Use    Vaping status: Never Used   Substance and Sexual Activity    Alcohol use: Yes     Alcohol/week: 1.0 standard drink of alcohol     Types: 1 Cans of beer per week    Drug use: No    Sexual activity: Yes     Partners: Female     Birth control/protection: Vasectomy         Current Outpatient Medications:     Accu-Chek FastClix Lancets misc, Use to check blood sugar daily dx e11.65, Disp: 100 each, Rfl: 3    Accu-Chek FastClix Lancets misc, 1 each by Other route Daily., Disp: , Rfl:     Accu-Chek FastClix Lancets misc, USE TO CHECK BLOOD SUGAR EVERY DAY, Disp: 60 each, Rfl: 0    Accu-Chek Guide test strip, USE TO TEST BLOOD SUGAR AS DIRECTED EVERY DAY, Disp: 100 each, Rfl: 3    alfuzosin (UROXATRAL) 10 MG 24 hr tablet, Take 1 tablet by mouth Daily., Disp: 90 tablet, Rfl: 3    ALPRAZolam (XANAX) 0.5 MG tablet, Take 1 tablet by mouth At Night As Needed for Anxiety or Sleep., Disp: 30 tablet, Rfl: 2    aspirin 81 MG tablet, Take 1 tablet by mouth Daily., Disp: , Rfl:     atorvastatin (LIPITOR) 20 MG tablet, Take 1 tablet by mouth Daily., Disp: 90 tablet, Rfl: 1    Continuous Glucose Sensor (FreeStyle Edyta 3 Plus Sensor) misc, Use 1 each Take As Directed. Use one sensor every 15 days.  Indications: Type 2 Diabetes, Disp: 6 each, Rfl: 3    Cream Base cream, Hydrocortisone 2.5%-nitroglycerin 0.2% ointment: Apply a small amount to the appropriate area twice a day as  "needed, Disp: 30 g, Rfl: 1    cyclobenzaprine (FLEXERIL) 10 MG tablet, Take 1 tablet by mouth 2 (Two) Times a Day As Needed for Muscle Spasms., Disp: 30 tablet, Rfl: 3    empagliflozin (Jardiance) 25 MG tablet tablet, Take 1 tablet by mouth Daily., Disp: 90 tablet, Rfl: 3    fluconazole (DIFLUCAN) 200 MG tablet, Take 1 tablet by mouth Daily. (Patient taking differently: Take 1 tablet by mouth Daily As Needed.), Disp: 14 tablet, Rfl: 0    glucose blood test strip, USE TO TEST BLOOD SUGAR AS DIRECTED EVERY DAY, Disp: , Rfl:     Hydrocortisone, Perianal, (ANUSOL-HC) 2.5 % rectal cream, Insert  into the rectum 2 (Two) Times a Day. (Patient taking differently: Insert  into the rectum 2 (Two) Times a Day As Needed.), Disp: 30 g, Rfl: 2    metFORMIN (GLUCOPHAGE) 1000 MG tablet, Take 1 tablet by mouth 2 (Two) Times a Day With Meals., Disp: 180 tablet, Rfl: 3    Mirabegron ER (Myrbetriq) 50 MG tablet sustained-release 24 hour 24 hr tablet, Take 1 tablet by mouth Daily., Disp: 30 tablet, Rfl: 5    Multiple Vitamins-Minerals (MULTIVITAMIN ADULTS 50+ PO), Take 1 tablet by mouth Daily., Disp: , Rfl:     ondansetron (ZOFRAN) 4 MG tablet, Take 1 tablet by mouth Every 8 (Eight) Hours As Needed., Disp: , Rfl:     tadalafil (Cialis) 10 MG tablet, Take 1 tablet by mouth Daily. (Patient taking differently: Take 1 tablet by mouth Daily As Needed.), Disp: 30 tablet, Rfl: 5      ECG 12 Lead    Date/Time: 10/18/2024 4:22 PM  Performed by: Mariaa Quan APRN    Authorized by: Mariaa Quan APRN  Comparison: compared with previous ECG from 10/18/2017  Rhythm: sinus rhythm  Rate: normal  BPM: 68    Clinical impression: normal ECG          PHQ-9 Total Score:      Objective     Vital Signs  /70 (BP Location: Left arm, Patient Position: Sitting, Cuff Size: Adult)   Pulse 83   Temp 98 °F (36.7 °C) (Infrared)   Ht 182.9 cm (72.01\")   Wt 77.7 kg (171 lb 3.2 oz)   SpO2 94%   BMI 23.21 kg/m²   Estimated body mass index is 23.21 " "kg/m² as calculated from the following:    Height as of this encounter: 182.9 cm (72.01\").    Weight as of this encounter: 77.7 kg (171 lb 3.2 oz).    BMI is within normal parameters. No other follow-up for BMI required.      Physical Exam  Vitals and nursing note reviewed.   HENT:      Head: Normocephalic.   Cardiovascular:      Rate and Rhythm: Normal rate.   Pulmonary:      Effort: Pulmonary effort is normal.      Breath sounds: Normal breath sounds.   Abdominal:      General: Abdomen is flat. Bowel sounds are normal. There is no distension.      Palpations: Abdomen is soft. There is no mass.      Tenderness: There is no abdominal tenderness.   Skin:     General: Skin is warm and dry.   Neurological:      General: No focal deficit present.      Mental Status: He is alert and oriented to person, place, and time.   Psychiatric:         Mood and Affect: Mood normal.         Behavior: Behavior normal.         Thought Content: Thought content normal.         Judgment: Judgment normal.          Assessment and Plan     Assessment/Plan:  Diagnoses and all orders for this visit:    1. Tachycardia (Primary)  -     CBC & Differential; Future  -     Comprehensive Metabolic Panel; Future  -     Lipid Panel; Future  -EKG normal sinus rhythm in office today.  -Will proceed with labs for evaluation.  -Continue all medications as prescribed.  -Continue follow up with cardiology in Nov.    2. Abdominal discomfort  -     CBC & Differential; Future  -     Comprehensive Metabolic Panel; Future  -     Lipid Panel; Future    3. Mixed hyperlipidemia  -     Lipid Panel; Future       There are no Patient Instructions on file for this visit.  Plan of care reviewed with patient at the conclusion of today's visit. Education was provided regarding diagnosis, management and any prescribed or recommended OTC medications.  Patient verbalizes understanding of and agreement with management plan.    Follow Up  Return for Next Scheduled Follow up. " 2/2025 for continued management of chronic conditions.    Mariaa Quan, APRN

## 2024-10-19 LAB
ALBUMIN SERPL-MCNC: 4 G/DL (ref 3.5–5.2)
ALBUMIN/GLOB SERPL: 1.9 G/DL
ALP SERPL-CCNC: 68 U/L (ref 39–117)
ALT SERPL W P-5'-P-CCNC: 23 U/L (ref 1–41)
ANION GAP SERPL CALCULATED.3IONS-SCNC: 8.9 MMOL/L (ref 5–15)
AST SERPL-CCNC: 19 U/L (ref 1–40)
BILIRUB SERPL-MCNC: 0.2 MG/DL (ref 0–1.2)
BUN SERPL-MCNC: 23 MG/DL (ref 8–23)
BUN/CREAT SERPL: 19.2 (ref 7–25)
CALCIUM SPEC-SCNC: 9.4 MG/DL (ref 8.6–10.5)
CHLORIDE SERPL-SCNC: 105 MMOL/L (ref 98–107)
CHOLEST SERPL-MCNC: 152 MG/DL (ref 0–200)
CO2 SERPL-SCNC: 24.1 MMOL/L (ref 22–29)
CREAT SERPL-MCNC: 1.2 MG/DL (ref 0.76–1.27)
EGFRCR SERPLBLD CKD-EPI 2021: 64.7 ML/MIN/1.73
GLOBULIN UR ELPH-MCNC: 2.1 GM/DL
GLUCOSE SERPL-MCNC: 183 MG/DL (ref 65–99)
HDLC SERPL-MCNC: 64 MG/DL (ref 40–60)
LDLC SERPL CALC-MCNC: 70 MG/DL (ref 0–100)
LDLC/HDLC SERPL: 1.07 {RATIO}
POTASSIUM SERPL-SCNC: 4.1 MMOL/L (ref 3.5–5.2)
PROT SERPL-MCNC: 6.1 G/DL (ref 6–8.5)
SODIUM SERPL-SCNC: 138 MMOL/L (ref 136–145)
TRIGL SERPL-MCNC: 98 MG/DL (ref 0–150)
VLDLC SERPL-MCNC: 18 MG/DL (ref 5–40)

## 2024-11-04 RX ORDER — ONDANSETRON 4 MG/1
4 TABLET, FILM COATED ORAL EVERY 8 HOURS PRN
Qty: 30 TABLET | Refills: 2 | Status: SHIPPED | OUTPATIENT
Start: 2024-11-04

## 2024-11-04 NOTE — TELEPHONE ENCOUNTER
Rx Refill Note  Requested Prescriptions     Pending Prescriptions Disp Refills    ondansetron (ZOFRAN) 4 MG tablet       Sig: Take 1 tablet by mouth Every 8 (Eight) Hours As Needed.      Last office visit with prescribing clinician: 8/14/2024   Last telemedicine visit with prescribing clinician: Visit date not found   Next office visit with prescribing clinician: 2/14/2025                         Would you like a call back once the refill request has been completed: [] Yes [] No    If the office needs to give you a call back, can they leave a voicemail: [] Yes [] No    Claritza Santacruz MA  11/04/24, 11:52 EST

## 2024-11-06 ENCOUNTER — OFFICE VISIT (OUTPATIENT)
Dept: CARDIOLOGY | Facility: CLINIC | Age: 74
End: 2024-11-06
Payer: MEDICARE

## 2024-11-06 VITALS
HEART RATE: 88 BPM | BODY MASS INDEX: 23.38 KG/M2 | SYSTOLIC BLOOD PRESSURE: 126 MMHG | WEIGHT: 172.6 LBS | OXYGEN SATURATION: 97 % | DIASTOLIC BLOOD PRESSURE: 60 MMHG | HEIGHT: 72 IN

## 2024-11-06 DIAGNOSIS — I10 ESSENTIAL HYPERTENSION: ICD-10-CM

## 2024-11-06 DIAGNOSIS — E78.2 MIXED HYPERLIPIDEMIA: Chronic | ICD-10-CM

## 2024-11-06 DIAGNOSIS — R07.89 OTHER CHEST PAIN: Primary | ICD-10-CM

## 2024-11-06 PROCEDURE — 1160F RVW MEDS BY RX/DR IN RCRD: CPT | Performed by: INTERNAL MEDICINE

## 2024-11-06 PROCEDURE — 3078F DIAST BP <80 MM HG: CPT | Performed by: INTERNAL MEDICINE

## 2024-11-06 PROCEDURE — 3074F SYST BP LT 130 MM HG: CPT | Performed by: INTERNAL MEDICINE

## 2024-11-06 PROCEDURE — 99214 OFFICE O/P EST MOD 30 MIN: CPT | Performed by: INTERNAL MEDICINE

## 2024-11-06 PROCEDURE — 1159F MED LIST DOCD IN RCRD: CPT | Performed by: INTERNAL MEDICINE

## 2024-11-06 NOTE — PROGRESS NOTES
[facility] Cardiology    Patient ID: Francisco Gonzalez is a 74 y.o. male.  : 1950   Contact: [phone]    Encounter date: 2024    PCP: Newton Cruz MD      Chief complaint:   Chief Complaint   Patient presents with    Exertional chest pain       Problem List:  Chest pain  Cardiolite 2013:  EF 63%, negative for ischemia  Echocardiogram, 2017. EF: 60%. No significant ST or T wave changes noted. No evidence of inducible ischemia by clinical, electrocardiographic or echocardiographic criteria.  Expected exercise duration 8 minutes.  Actual exercise duration 13 minutes  Hypertension  Hyperlipidemia  Diabetes  BPH s/p UroLift    No Known Allergies    Current Medications:    Current Outpatient Medications:     Accu-Chek FastClix Lancets misc, Use to check blood sugar daily dx e11.65, Disp: 100 each, Rfl: 3    Accu-Chek FastClix Lancets misc, 1 each by Other route Daily., Disp: , Rfl:     Accu-Chek FastClix Lancets misc, USE TO CHECK BLOOD SUGAR EVERY DAY, Disp: 60 each, Rfl: 0    Accu-Chek Guide test strip, USE TO TEST BLOOD SUGAR AS DIRECTED EVERY DAY, Disp: 100 each, Rfl: 3    alfuzosin (UROXATRAL) 10 MG 24 hr tablet, Take 1 tablet by mouth Daily., Disp: 90 tablet, Rfl: 3    ALPRAZolam (XANAX) 0.5 MG tablet, Take 1 tablet by mouth At Night As Needed for Anxiety or Sleep., Disp: 30 tablet, Rfl: 2    aspirin 81 MG tablet, Take 1 tablet by mouth Daily., Disp: , Rfl:     atorvastatin (LIPITOR) 20 MG tablet, Take 1 tablet by mouth Daily., Disp: 90 tablet, Rfl: 1    Continuous Glucose Sensor (FreeStyle Edyta 3 Plus Sensor), Use 1 each Take As Directed. Use one sensor every 15 days.  Indications: Type 2 Diabetes, Disp: 6 each, Rfl: 3    Cream Base cream, Hydrocortisone 2.5%-nitroglycerin 0.2% ointment: Apply a small amount to the appropriate area twice a day as needed, Disp: 30 g, Rfl: 1    cyclobenzaprine (FLEXERIL) 10 MG tablet, Take 1 tablet by mouth 2 (Two)  Times a Day As Needed for Muscle Spasms., Disp: 30 tablet, Rfl: 3    empagliflozin (Jardiance) 25 MG tablet tablet, Take 1 tablet by mouth Daily., Disp: 90 tablet, Rfl: 3    glucose blood test strip, USE TO TEST BLOOD SUGAR AS DIRECTED EVERY DAY, Disp: , Rfl:     Hydrocortisone, Perianal, (ANUSOL-HC) 2.5 % rectal cream, Insert  into the rectum 2 (Two) Times a Day. (Patient taking differently: Insert  into the rectum 2 (Two) Times a Day As Needed.), Disp: 30 g, Rfl: 2    metFORMIN (GLUCOPHAGE) 1000 MG tablet, Take 1 tablet by mouth 2 (Two) Times a Day With Meals., Disp: 180 tablet, Rfl: 3    Mirabegron ER (Myrbetriq) 50 MG tablet sustained-release 24 hour 24 hr tablet, Take 1 tablet by mouth Daily., Disp: 30 tablet, Rfl: 5    Multiple Vitamins-Minerals (MULTIVITAMIN ADULTS 50+ PO), Take 1 tablet by mouth Daily., Disp: , Rfl:     ondansetron (ZOFRAN) 4 MG tablet, Take 1 tablet by mouth Every 8 (Eight) Hours As Needed for Nausea., Disp: 30 tablet, Rfl: 2    tadalafil (Cialis) 10 MG tablet, Take 1 tablet by mouth Daily. (Patient taking differently: Take 1 tablet by mouth Daily As Needed.), Disp: 30 tablet, Rfl: 5    HPI    Francisco Gonzalez is a 74 y.o. male who presents today for a 1 year follow up of chest pain and cardiac risk factors. Since last visit, patient has been doing well overall from a cardiovascular standpoint. He stays busy and active by doing 30 minutes of cardio on the treadmill with a 9 to 11 degree incline and speed from 2.0 to 2.5 mph and weight lifting.  Patient has been experiencing some discomfort in his upper body and chest. He was seen by his PCP following the discomfort and had a normal EKG. Patient notes this discomfort started after his COVID-19 booster shot and adding supersets to his exercise routine. He noticed his pulse was more elevated than usual during supersets so he has reduced frequency. Patient denies shortness of breath, orthopnea, palpitations, edema, dizziness, and syncope.  "      The following portions of the patient's history were reviewed and updated as appropriate: allergies, current medications and problem list.    Pertinent positives as listed in the HPI.  All other systems reviewed are negative.         Vitals:    11/06/24 1441   BP: 126/60   BP Location: Left arm   Patient Position: Sitting   Pulse: 88   SpO2: 97%   Weight: 78.3 kg (172 lb 9.6 oz)   Height: 182.9 cm (72\")       Physical Exam:  General: Alert and oriented.  Neck: Jugular venous pressure is within normal limits. Carotids have normal upstrokes without bruits.   Cardiovascular: Heart has a nondisplaced focal PMI. Regular rate and rhythm. No murmur, gallop or rub.  Lungs: Clear, no rales or wheezes. Equal expansion is noted.   Extremities: Show no edema.  Skin: Warm and dry.  Neurologic: Nonfocal.     Diagnostic Data (reviewed with patient):  Lab Results   Component Value Date    GLUCOSE 183 (H) 10/18/2024    BUN 23 10/18/2024    CREATININE 1.20 10/18/2024    BCR 19.2 10/18/2024     10/18/2024    K 4.1 10/18/2024     10/18/2024    CO2 24.1 10/18/2024    CALCIUM 9.4 10/18/2024    ALBUMIN 4.0 10/18/2024    ALKPHOS 68 10/18/2024    AST 19 10/18/2024    ALT 23 10/18/2024     Lab Results   Component Value Date    CHOL 152 10/18/2024    TRIG 98 10/18/2024    HDL 64 (H) 10/18/2024    LDL 70 10/18/2024      Lab Results   Component Value Date    WBC 5.08 10/18/2024    RBC 4.67 10/18/2024    HGB 15.2 10/18/2024    HCT 44.5 10/18/2024    MCV 95.3 10/18/2024     10/18/2024      Lab Results   Component Value Date    TSH 1.380 05/20/2024        Advance Care Planning   ACP discussion was held with the patient during this visit. Patient has an advance directive in EMR which is still valid.          Procedures      Assessment:    ICD-10-CM ICD-9-CM   1. Other chest pain  R07.89 786.59   2. Essential hypertension  I10 401.9   3. Mixed hyperlipidemia  E78.2 272.2         Plan:  Patient was encouraged to continue to " be active and have a healthy diet.  He continues to have symptoms in his chest and they begin occurring with exertional activity will order a stress test.  Continue on aspirin 81 mg for antiplatelet therapy.   Continue on atorvastatin 20 mg daily for hyperlipidemia.   Continue all other current medications.  F/up in 12 months, sooner if needed.      Scribed for Lauren Ramos MD by Joi Arguelles. 11/6/2024 14:54 EST    I Lauren Ramos MD personally performed the services described in this documentation as scribed by the above individual in my presence, and it is both accurate and complete.    Lauren Ramos MD, FACC

## 2024-12-06 ENCOUNTER — SPECIALTY PHARMACY (OUTPATIENT)
Dept: ENDOCRINOLOGY | Facility: CLINIC | Age: 74
End: 2024-12-06
Payer: MEDICARE

## 2024-12-06 ENCOUNTER — OFFICE VISIT (OUTPATIENT)
Dept: ENDOCRINOLOGY | Facility: CLINIC | Age: 74
End: 2024-12-06
Payer: MEDICARE

## 2024-12-06 VITALS
HEART RATE: 80 BPM | DIASTOLIC BLOOD PRESSURE: 56 MMHG | SYSTOLIC BLOOD PRESSURE: 112 MMHG | HEIGHT: 72 IN | WEIGHT: 174.2 LBS | OXYGEN SATURATION: 96 % | BODY MASS INDEX: 23.6 KG/M2

## 2024-12-06 DIAGNOSIS — E78.2 MIXED HYPERLIPIDEMIA: Chronic | ICD-10-CM

## 2024-12-06 DIAGNOSIS — I10 ESSENTIAL HYPERTENSION: Chronic | ICD-10-CM

## 2024-12-06 DIAGNOSIS — E11.65 TYPE 2 DIABETES MELLITUS WITH HYPERGLYCEMIA, WITHOUT LONG-TERM CURRENT USE OF INSULIN: Primary | ICD-10-CM

## 2024-12-06 LAB
EXPIRATION DATE: ABNORMAL
EXPIRATION DATE: ABNORMAL
GLUCOSE BLDC GLUCOMTR-MCNC: 150 MG/DL (ref 70–130)
HBA1C MFR BLD: 7 % (ref 4.5–5.7)
Lab: ABNORMAL
Lab: ABNORMAL

## 2024-12-06 NOTE — PROGRESS NOTES
Specialty Pharmacy Patient Management Program  Endocrinology Reassessment     Franicsco Gonzalez was referred by an Endocrinology provider to the Endocrinology Patient Management program offered by [facility] for Type 2 Diabetes. A follow-up outreach was conducted, including assessment of continued therapy appropriateness, medication adherence, and side effect incidence and management for Jardiance.    Changes to Insurance Coverage or Financial Support  None.     Relevant Past Medical History and Comorbidities  Relevant medical history and concomitant health conditions were discussed with the patient. The patient's chart has been reviewed for relevant past medical history and comorbid health conditions and updated as necessary.   Past Medical History:   Diagnosis Date    Anxiety     Benign prostatic hyperplasia 2014    Chicken pox     Colonic polyp     Erectile dysfunction     Essential hypertension     HL (hearing loss) 2016    Hydrocele 2001    left; repaired    Hyperlipidemia     Measles     Multiple endocrine neoplasia     Quadriceps tendon rupture 2010    Type 2 diabetes mellitus      Social History     Socioeconomic History    Marital status:    Tobacco Use    Smoking status: Never     Passive exposure: Past    Smokeless tobacco: Never   Vaping Use    Vaping status: Never Used   Substance and Sexual Activity    Alcohol use: Yes     Alcohol/week: 1.0 standard drink of alcohol     Types: 1 Cans of beer per week     Comment: occas    Drug use: No    Sexual activity: Yes     Partners: Female     Birth control/protection: Vasectomy     Problem list reviewed by Radha Villa, PharmD on 12/6/2024 at  3:11 PM    Hospitalizations and Urgent Care Since Last Assessment  ED Visits, Admissions, or Hospitalizations: None  Urgent Office Visits: None    Allergies  Known allergies and reactions were discussed with the patient. The patient's chart has been reviewed for allergy information and  updated as necessary.   No Known Allergies  Allergies reviewed by Radha Villa PharmD on 12/6/2024 at  3:11 PM    Relevant Laboratory Values  Relevant laboratory values were discussed with the patient. The following specialty medication dose adjustment(s) are recommended: No changes.   A1C Last 3 Results          5/20/2024    13:44 8/21/2024    13:45 12/6/2024    14:34   HGBA1C Last 3 Results   Hemoglobin A1C 7.3  7.4  7.0      Lab Results   Component Value Date    HGBA1C 7.0 (A) 12/06/2024     Lab Results   Component Value Date    GLUCOSE 183 (H) 10/18/2024    CALCIUM 9.4 10/18/2024     10/18/2024    K 4.1 10/18/2024    CO2 24.1 10/18/2024     10/18/2024    BUN 23 10/18/2024    CREATININE 1.20 10/18/2024    EGFRIFNONA 61 01/05/2022    BCR 19.2 10/18/2024    ANIONGAP 8.9 10/18/2024     Lab Results   Component Value Date    CHOL 152 10/18/2024    TRIG 98 10/18/2024    HDL 64 (H) 10/18/2024    LDL 70 10/18/2024     Microalbumin          1/23/2024    08:37   Microalbumin   Microalbumin, Urine <1.2      Current Medication List  This medication list has been reviewed with the patient and evaluated for any interactions or necessary modifications/recommendations, and updated to include all prescription medications, OTC medications, and supplements the patient is currently taking.  This list reflects what is contained in the patient's profile, which has also been marked as reviewed to communicate to other providers it is the most up to date version of the patient's current medication therapy.     Current Outpatient Medications:     Accu-Chek FastClix Lancets misc, Use to check blood sugar daily dx e11.65, Disp: 100 each, Rfl: 3    Accu-Chek FastClix Lancets misc, 1 each by Other route Daily., Disp: , Rfl:     Accu-Chek FastClix Lancets misc, USE TO CHECK BLOOD SUGAR EVERY DAY, Disp: 60 each, Rfl: 0    Accu-Chek Guide test strip, USE TO TEST BLOOD SUGAR AS DIRECTED EVERY DAY, Disp: 100 each, Rfl: 3     alfuzosin (UROXATRAL) 10 MG 24 hr tablet, Take 1 tablet by mouth Daily., Disp: 90 tablet, Rfl: 3    ALPRAZolam (XANAX) 0.5 MG tablet, Take 1 tablet by mouth At Night As Needed for Anxiety or Sleep., Disp: 30 tablet, Rfl: 2    aspirin 81 MG tablet, Take 1 tablet by mouth Daily., Disp: , Rfl:     atorvastatin (LIPITOR) 20 MG tablet, Take 1 tablet by mouth Daily., Disp: 90 tablet, Rfl: 1    Continuous Glucose Sensor (FreeStyle Edyta 3 Plus Sensor), Use 1 each Take As Directed. Use one sensor every 15 days.  Indications: Type 2 Diabetes, Disp: 6 each, Rfl: 3    Cream Base cream, Hydrocortisone 2.5%-nitroglycerin 0.2% ointment: Apply a small amount to the appropriate area twice a day as needed, Disp: 30 g, Rfl: 1    cyclobenzaprine (FLEXERIL) 10 MG tablet, Take 1 tablet by mouth 2 (Two) Times a Day As Needed for Muscle Spasms., Disp: 30 tablet, Rfl: 3    empagliflozin (Jardiance) 25 MG tablet tablet, Take 1 tablet by mouth Daily., Disp: 90 tablet, Rfl: 3    glucose blood test strip, USE TO TEST BLOOD SUGAR AS DIRECTED EVERY DAY, Disp: , Rfl:     Hydrocortisone, Perianal, (ANUSOL-HC) 2.5 % rectal cream, Insert  into the rectum 2 (Two) Times a Day. (Patient taking differently: Insert  into the rectum 2 (Two) Times a Day As Needed.), Disp: 30 g, Rfl: 2    metFORMIN (GLUCOPHAGE) 1000 MG tablet, Take 1 tablet by mouth 2 (Two) Times a Day With Meals., Disp: 180 tablet, Rfl: 3    methylPREDNISolone (Medrol) 4 MG dose pack, Take as directed on package instructions, Disp: 21 tablet, Rfl: 0    Mirabegron ER (Myrbetriq) 50 MG tablet sustained-release 24 hour 24 hr tablet, Take 1 tablet by mouth Daily., Disp: 30 tablet, Rfl: 5    Multiple Vitamins-Minerals (MULTIVITAMIN ADULTS 50+ PO), Take 1 tablet by mouth Daily., Disp: , Rfl:     ondansetron (ZOFRAN) 4 MG tablet, Take 1 tablet by mouth Every 8 (Eight) Hours As Needed for Nausea., Disp: 30 tablet, Rfl: 2    tadalafil (Cialis) 10 MG tablet, Take 1 tablet by mouth Daily. (Patient  taking differently: Take 1 tablet by mouth Daily As Needed.), Disp: 30 tablet, Rfl: 5    Triamcinolone Acetonide (Nasacort Allergy 24HR) 55 MCG/ACT nasal inhaler, Administer 1-2 sprays into the nostril(s) as directed by provider 2 (Two) Times a Day for 2 weeks, THEN only as needed thereafter, Disp: 16.9 mL, Rfl: 6    Medicines reviewed by Radha Villa, Cezar on 12/6/2024 at  3:11 PM    Drug Interactions  No Clinically Significant DDIs Were Identified at Present Time Upon Marking Medications Reviewed      Recommended Medications Assessment  Aspirin: Currently Taking   Statin: Currently Taking   ACEi/ARB: Not Taking Currently    Adverse Drug Reactions  Medication tolerability: Tolerating with no to minimal ADRs  Medication plan: Continue therapy with normal follow-up  Plan for ADR Management: None    Adherence, Self-Administration, and Current Therapy Problems  Adherence related to the patient's specialty therapy was discussed with the patient. The Adherence segment of this outreach has been reviewed and updated.     Adherence Questions  Linked Medication(s) Assessed: Empagliflozin (JARDIANCE)  On average, how many doses/injections does the patient miss per month?: 0  What are the identified reasons for non-adherence or missed doses? : no problems identified  What is the estimated medication adherence level?: %  Based on the patient/caregiver response and refill history, does this patient require an MTP to track adherence improvements?: no    Additional Barriers to Patient Self-Administration: None  Methods for Supporting Patient Self-Administration: None    Open Medication Therapy Problems  No medication therapy recommendations to display    Goals of Therapy  Goals related to the patient's specialty therapy were discussed with the patient. The Patient Goals segment of this outreach has been reviewed and updated.   Goals Addressed Today        Specialty Pharmacy General Goal      A1C < 7 %     1/4/23: On  Enrollment, most recent A1C was 6.7%     5/20/24: A1C 7.3% today, so slightly above goal.  Patient feels diet could be better, will try to make adjustments to lifestyle. Provider agreeable to plan before adding additional therapy, so no changes today.     8/21/24: A1c 7.4% today, slightly above goal. Planning to use a Edyta for a couple of weeks to see when the glucoses are elevated. No changes today. MS    12/6/24: A1c 7%. Having some post prandial highs in the morning. No changes today. MS               Quality of Life Assessment   Quality of Life related to the patient's enrollment in the patient management program and services provided was discussed with the patient. The QOL segment of this outreach has been reviewed and updated.  Quality of Life Improvement Scale: 9-A good deal better    Reassessment Plan & Follow-Up  1. Medication Therapy Changes: No changes today.   2. Related Plans, Therapy Recommendations, or Issues to Be Addressed: None.  3. Pharmacist to perform regular assessments no more than (6) months from the previous assessment.  4. Care Coordinator to set up future refill outreaches, coordinate prescription delivery, and escalate clinical questions to pharmacist.    Attestation  Therapeutic appropriateness: Appropriate   I attest the patient was actively involved in and has agreed to the above plan of care.  If the prescribed therapy is at any point deemed not appropriate based on the current or future assessments, a consultation will be initiated with the patient's specialty care provider to determine the best course of action. The revised plan of therapy will be documented along with any required assessments and/or additional patient education provided.     Radha Villa, PharmD, BCCCP, BCPS  Clinical Specialty Pharmacist, Endocrinology  12/6/2024  15:12 EST    Discussed the aforementioned information with the patient via In-Person.

## 2024-12-06 NOTE — ASSESSMENT & PLAN NOTE
Diabetes is improving with treatment.   Continue current treatment regimen.  Diabetes will be reassessed in 6 months.    FreeStyle Edyta 3 CGM was downloaded today.  Data was reviewed from 11/23/24 to 12/6/24.  This showed good overnight glucose control.  Having consistent spike with BF.  Less of a  spike with lunch and supper.  Time in range was 84%.

## 2024-12-06 NOTE — PROGRESS NOTES
"     Office Note      Date: 2024  Patient Name: Francisco Gonzalez  MRN: [id number]  : 1950    Chief Complaint   Patient presents with    Diabetes     Type II    Hyperlipidemia     Mixed    Hypertension     Essential       History of Present Illness:   Francisco Gonzalez is a 74 y.o. male who presents for Diabetes type 2. Diagnosed in: . Treated in past with oral agents. Current treatments: metformin and jardiance. Number of insulin shots per day: none. Checks blood sugar 288 times a day - on FreeStyle Edyta 3. Has low blood sugar: no. Aspirin use: Yes. Statin use: Yes. ACE-I/ARB use: No. Changes in health since last visit: none. Last eye exam 2024.     Subjective      Diabetic Complications:  Eyes: No  Kidneys: No  Feet: No  Heart: No    Diet and Exercise:  Meals per day: 3  Minutes of exercise per week: 270 mins.    Review of Systems:   Review of Systems   Constitutional: Negative.    Cardiovascular: Negative.    Gastrointestinal: Negative.    Endocrine: Negative.        The following portions of the patient's history were reviewed and updated as appropriate: allergies, current medications, past family history, past medical history, past social history, past surgical history, and problem list.    Objective     Visit Vitals  /56 (BP Location: Left arm, Patient Position: Sitting, Cuff Size: Adult)   Pulse 80   Ht 182.9 cm (72.01\")   Wt 79 kg (174 lb 3.2 oz)   SpO2 96%   BMI 23.62 kg/m²       Physical Exam:  Physical Exam  Constitutional:       Appearance: Normal appearance.   Neurological:      Mental Status: He is alert.         Labs:    HbA1c  Lab Results   Component Value Date    HGBA1C 7.0 (A) 2024       CMP  Lab Results   Component Value Date    GLUCOSE 183 (H) 10/18/2024    BUN 23 10/18/2024    CREATININE 1.20 10/18/2024    EGFRIFNONA 61 2022    BCR 19.2 10/18/2024    K 4.1 10/18/2024    CO2 24.1 10/18/2024    CALCIUM 9.4 10/18/2024    AST 19 10/18/2024    ALT 23 " "10/18/2024        Lipid Panel  Lab Results   Component Value Date    HDL Cholesterol 64 (H) 10/18/2024    LDL Cholesterol  70 10/18/2024    LDL/HDL Ratio 1.07 10/18/2024    Triglycerides 98 10/18/2024        TSH  Lab Results   Component Value Date    TSH 1.380 05/20/2024        Hemoglobin A1C  No components found for: \"HGBA1C\"     Microalbumin/Creatinine  Lab Results   Component Value Date    MALBCRERATIO  01/23/2024      Comment:      Unable to calculate    MICROALBUR <1.2 01/23/2024           Assessment / Plan      Assessment & Plan:  Diagnoses and all orders for this visit:    1. Type 2 diabetes mellitus with hyperglycemia, without long-term current use of insulin (Primary)  Assessment & Plan:  Diabetes is improving with treatment.   Continue current treatment regimen.  Diabetes will be reassessed in 6 months.    FreeStyle Edyta 3 CGM was downloaded today.  Data was reviewed from 11/23/24 to 12/6/24.  This showed good overnight glucose control.  Having consistent spike with BF.  Less of a  spike with lunch and supper.  Time in range was 84%.    Orders:  -     POC Glucose, Blood  -     POC Glycosylated Hemoglobin (Hb A1C)    2. Essential hypertension  Assessment & Plan:  Hypertension is stable and controlled  Continue current treatment regimen.  Blood pressure will be reassessed in 6 months.      3. Mixed hyperlipidemia  Assessment & Plan:  Continue statin.  Recent lipids looked good.        Current Outpatient Medications   Medication Instructions    Accu-Chek FastClix Lancets misc Use to check blood sugar daily dx e11.65    Accu-Chek FastClix Lancets misc 1 each, Daily    Accu-Chek FastClix Lancets misc USE TO CHECK BLOOD SUGAR EVERY DAY    Accu-Chek Guide test strip USE TO TEST BLOOD SUGAR AS DIRECTED EVERY DAY    alfuzosin (UROXATRAL) 10 mg, Oral, Daily    ALPRAZolam (XANAX) 0.5 mg, Oral, Nightly PRN    aspirin 81 mg, Daily    atorvastatin (LIPITOR) 20 mg, Oral, Daily    Continuous Glucose Sensor (FreeStyle " Edyta 3 Plus Sensor) 1 each, Not Applicable, Take As Directed, Use one sensor every 15 days.    Cream Base cream Hydrocortisone 2.5%-nitroglycerin 0.2% ointment: Apply a small amount to the appropriate area twice a day as needed    cyclobenzaprine (FLEXERIL) 10 mg, Oral, 2 Times Daily PRN    glucose blood test strip USE TO TEST BLOOD SUGAR AS DIRECTED EVERY DAY    Hydrocortisone, Perianal, (ANUSOL-HC) 2.5 % rectal cream Rectal, 2 Times Daily    Jardiance 25 mg, Oral, Daily    metFORMIN (GLUCOPHAGE) 1,000 mg, Oral, 2 Times Daily With Meals    methylPREDNISolone (Medrol) 4 MG dose pack Take as directed on package instructions    Mirabegron ER (Myrbetriq) 50 MG tablet sustained-release 24 hour 24 hr tablet Take 1 tablet by mouth Daily.    Multiple Vitamins-Minerals (MULTIVITAMIN ADULTS 50+ PO) 1 tablet, Daily    ondansetron (ZOFRAN) 4 mg, Oral, Every 8 Hours PRN    tadalafil (CIALIS) 10 mg, Oral, Daily    Triamcinolone Acetonide (Nasacort Allergy 24HR) 55 MCG/ACT nasal inhaler Administer 1-2 sprays into the nostril(s) as directed by provider 2 (Two) Times a Day for 2 weeks, THEN only as needed thereafter      Return in about 6 months (around 6/6/2025) for Recheck with A1c, CMP, lipid, TSH, microalbumin, foot exam.    Electronically signed by: Jose Allen MD  12/06/2024

## 2024-12-06 NOTE — PROGRESS NOTES
" Specialty Pharmacy Patient Management Program  Endocrinology Refill Outreach      Francisco \"Babak\" is a 74 y.o. male contacted today regarding refills of his medication(s).    Specialty medication(s) and dose(s) confirmed: na    Refill Questions      Flowsheet Row Most Recent Value   Changes to allergies? No   Changes to medications? No   New conditions or infections since last clinic visit No   Unplanned office visit, urgent care, ED, or hospital admission in the last 4 weeks  No   How does patient/caregiver feel medication is working? Very good   Financial problems or insurance changes  No   Since the previous refill, were any specialty medication doses or scheduled injections missed or delayed?  No   Does this patient require a clinical escalation to a pharmacist? No          Delivery Questions      Flowsheet Row Most Recent Value   Delivery method [employer]   Delivery address verified with patient/caregiver? Yes   Delivery address Home   Number of medications in delivery 1   Medication(s) being filled and delivered Continuous Glucose Sensor (FreeStyle Edyta 3 Plus Sensor)   Copay verified? Yes   Copay amount 38.01   Copay form of payment Credit/debit on file   Ship Date 12/9   Delivery Date 12/10   Signature Required Yes            Follow-Up: 30d    Radha Villa, Pharm.D. BCPS, BCCCP  Clinical Specialty Pharmacist, Endocrinology   15:11 EST  12/06/24    "

## 2025-01-02 ENCOUNTER — SPECIALTY PHARMACY (OUTPATIENT)
Dept: ENDOCRINOLOGY | Facility: CLINIC | Age: 75
End: 2025-01-02
Payer: MEDICARE

## 2025-01-02 NOTE — PROGRESS NOTES
" Specialty Pharmacy Patient Management Program  Endocrinology Refill Outreach      Francisco \"Babak\" is a 74 y.o. male contacted today regarding refills of his medication(s).    Specialty medication(s) and dose(s) confirmed: Jardiance    Refill Questions      Flowsheet Row Most Recent Value   Changes to allergies? No   Changes to medications? No   New conditions or infections since last clinic visit No   Unplanned office visit, urgent care, ED, or hospital admission in the last 4 weeks  No   How does patient/caregiver feel medication is working? Very good   Financial problems or insurance changes  No   Since the previous refill, were any specialty medication doses or scheduled injections missed or delayed?  No   Does this patient require a clinical escalation to a pharmacist? No          Delivery Questions      Flowsheet Row Most Recent Value   Delivery method [employer]Ex   Delivery address verified with patient/caregiver? Yes   Delivery address Home   Number of medications in delivery 2   Medication(s) being filled and delivered Mirabegron (MYRBETRIQ), Empagliflozin (JARDIANCE)   Doses left of specialty medications 3 days   Copay verified? Yes   Copay amount 188.56   Copay form of payment Credit/debit on file   Ship Date 01/02   Delivery Date 01/03   Signature Required No            Follow-Up: 30d    Funmi Recio CPhT  Pharmacy Care Coordinator, Endocrinology  1/2/2025  10:18 EST          "

## 2025-01-09 ENCOUNTER — TELEPHONE (OUTPATIENT)
Dept: GENERAL RADIOLOGY | Facility: HOSPITAL | Age: 75
End: 2025-01-09
Payer: MEDICARE

## 2025-01-09 RX ORDER — HYDROCHLOROTHIAZIDE 12.5 MG/1
1 CAPSULE ORAL TAKE AS DIRECTED
Qty: 6 EACH | Refills: 3 | Status: SHIPPED | OUTPATIENT
Start: 2025-01-09

## 2025-01-09 NOTE — TELEPHONE ENCOUNTER
Specialty Pharmacy Patient Management Program  Per Protocol Prescription Order/Refill     Patient currently fills medications at [facility] and is enrolled in an Endocrinology Patient Management Program.     Requested Prescriptions     Pending Prescriptions Disp Refills    Continuous Glucose Sensor (FreeStyle Edyta 3 Plus Sensor) 6 each 3     Sig: Use 1 each Take As Directed. Use one sensor every 15 days.  Indications: Type 2 Diabetes     Prescription orders above were sent to the pharmacy per Collaborative Care Agreement Protocol.     Patient requested refill    Rick [email or web address].  Clinical Specialty Pharmacist, Endocrinology  11:26 EST 01/09/25

## 2025-01-27 ENCOUNTER — SPECIALTY PHARMACY (OUTPATIENT)
Dept: ENDOCRINOLOGY | Facility: CLINIC | Age: 75
End: 2025-01-27
Payer: MEDICARE

## 2025-01-27 ENCOUNTER — TELEPHONE (OUTPATIENT)
Dept: GENERAL RADIOLOGY | Facility: HOSPITAL | Age: 75
End: 2025-01-27
Payer: MEDICARE

## 2025-01-27 RX ORDER — ATORVASTATIN CALCIUM 20 MG/1
20 TABLET, FILM COATED ORAL DAILY
Qty: 90 TABLET | Refills: 1 | Status: SHIPPED | OUTPATIENT
Start: 2025-01-27

## 2025-01-27 NOTE — PROGRESS NOTES
" Specialty Pharmacy Patient Management Program  Endocrinology Refill Outreach      Francisco \"Babak\" is a 74 y.o. male contacted today regarding refills of his medication(s).    Specialty medication(s) and dose(s) confirmed: jardiance    Refill Questions      Flowsheet Row Most Recent Value   Changes to allergies? No   Changes to medications? No   New conditions or infections since last clinic visit No   Unplanned office visit, urgent care, ED, or hospital admission in the last 4 weeks  No   How does patient/caregiver feel medication is working? Very good   Financial problems or insurance changes  No   Since the previous refill, were any specialty medication doses or scheduled injections missed or delayed?  No   Does this patient require a clinical escalation to a pharmacist? No          Delivery Questions      Flowsheet Row Most Recent Value   Delivery method  at Pharmacy   Number of medications in delivery 1   Medication(s) being filled and delivered Empagliflozin (JARDIANCE)   Doses left of specialty medications 4 days   Copay verified? Yes   Copay amount 141.00   Copay form of payment Pay at pickup   Signature Required No            Follow-Up: 90d    Funmi Recio CPhT  Pharmacy Care Coordinator, Endocrinology  1/27/2025  16:25 EST          "

## 2025-01-27 NOTE — TELEPHONE ENCOUNTER
Specialty Pharmacy Patient Management Program  Per Protocol Prescription Order/Refill     Patient currently fills medications at [facility] and is enrolled in an Endocrinology Patient Management Program.     Requested Prescriptions     Pending Prescriptions Disp Refills    empagliflozin (Jardiance) 25 MG tablet tablet 90 tablet 3     Sig: Take 1 tablet by mouth Daily.     Prescription orders above were sent to the pharmacy per Collaborative Care Agreement Protocol.       Rick Lu Pharm.D.  Clinical Specialty Pharmacist, Endocrinology  08:59 EST 01/27/25

## 2025-01-29 ENCOUNTER — PATIENT MESSAGE (OUTPATIENT)
Dept: INTERNAL MEDICINE | Facility: CLINIC | Age: 75
End: 2025-01-29
Payer: MEDICARE

## 2025-02-07 DIAGNOSIS — E78.2 MIXED HYPERLIPIDEMIA: Chronic | ICD-10-CM

## 2025-02-07 DIAGNOSIS — Z12.5 PROSTATE CANCER SCREENING: ICD-10-CM

## 2025-02-07 DIAGNOSIS — I10 ESSENTIAL HYPERTENSION: Primary | Chronic | ICD-10-CM

## 2025-02-07 DIAGNOSIS — N40.0 HYPERTROPHY OF PROSTATE WITHOUT URINARY OBSTRUCTION: ICD-10-CM

## 2025-02-10 ENCOUNTER — TELEPHONE (OUTPATIENT)
Dept: GENERAL RADIOLOGY | Facility: HOSPITAL | Age: 75
End: 2025-02-10
Payer: MEDICARE

## 2025-02-10 ENCOUNTER — LAB (OUTPATIENT)
Dept: LAB | Facility: HOSPITAL | Age: 75
End: 2025-02-10
Payer: MEDICARE

## 2025-02-10 DIAGNOSIS — N40.0 HYPERTROPHY OF PROSTATE WITHOUT URINARY OBSTRUCTION: ICD-10-CM

## 2025-02-10 DIAGNOSIS — Z12.5 PROSTATE CANCER SCREENING: ICD-10-CM

## 2025-02-10 DIAGNOSIS — I10 ESSENTIAL HYPERTENSION: ICD-10-CM

## 2025-02-10 DIAGNOSIS — E78.2 MIXED HYPERLIPIDEMIA: Chronic | ICD-10-CM

## 2025-02-10 DIAGNOSIS — E11.65 TYPE 2 DIABETES MELLITUS WITH HYPERGLYCEMIA, WITHOUT LONG-TERM CURRENT USE OF INSULIN: Chronic | ICD-10-CM

## 2025-02-10 LAB
ALBUMIN SERPL-MCNC: 4.1 G/DL (ref 3.5–5.2)
ALBUMIN UR-MCNC: <1.2 MG/DL
ALBUMIN/GLOB SERPL: 1.9 G/DL
ALP SERPL-CCNC: 66 U/L (ref 39–117)
ALT SERPL W P-5'-P-CCNC: 20 U/L (ref 1–41)
ANION GAP SERPL CALCULATED.3IONS-SCNC: 9.8 MMOL/L (ref 5–15)
AST SERPL-CCNC: 19 U/L (ref 1–40)
BASOPHILS # BLD AUTO: 0.04 10*3/MM3 (ref 0–0.2)
BASOPHILS NFR BLD AUTO: 0.9 % (ref 0–1.5)
BILIRUB SERPL-MCNC: 0.5 MG/DL (ref 0–1.2)
BUN SERPL-MCNC: 22 MG/DL (ref 8–23)
BUN/CREAT SERPL: 19.3 (ref 7–25)
CALCIUM SPEC-SCNC: 9.5 MG/DL (ref 8.6–10.5)
CHLORIDE SERPL-SCNC: 106 MMOL/L (ref 98–107)
CHOLEST SERPL-MCNC: 151 MG/DL (ref 0–200)
CO2 SERPL-SCNC: 26.2 MMOL/L (ref 22–29)
CREAT SERPL-MCNC: 1.14 MG/DL (ref 0.76–1.27)
CREAT UR-MCNC: 79.6 MG/DL
DEPRECATED RDW RBC AUTO: 42.1 FL (ref 37–54)
EGFRCR SERPLBLD CKD-EPI 2021: 68.3 ML/MIN/1.73
EOSINOPHIL # BLD AUTO: 0.42 10*3/MM3 (ref 0–0.4)
EOSINOPHIL NFR BLD AUTO: 9.2 % (ref 0.3–6.2)
ERYTHROCYTE [DISTWIDTH] IN BLOOD BY AUTOMATED COUNT: 12.5 % (ref 12.3–15.4)
GLOBULIN UR ELPH-MCNC: 2.2 GM/DL
GLUCOSE SERPL-MCNC: 150 MG/DL (ref 65–99)
HCT VFR BLD AUTO: 47.3 % (ref 37.5–51)
HDLC SERPL-MCNC: 68 MG/DL (ref 40–60)
HGB BLD-MCNC: 15.8 G/DL (ref 13–17.7)
IMM GRANULOCYTES # BLD AUTO: 0.01 10*3/MM3 (ref 0–0.05)
IMM GRANULOCYTES NFR BLD AUTO: 0.2 % (ref 0–0.5)
LDLC SERPL CALC-MCNC: 70 MG/DL (ref 0–100)
LDLC/HDLC SERPL: 1.04 {RATIO}
LYMPHOCYTES # BLD AUTO: 1.05 10*3/MM3 (ref 0.7–3.1)
LYMPHOCYTES NFR BLD AUTO: 23 % (ref 19.6–45.3)
MCH RBC QN AUTO: 31.2 PG (ref 26.6–33)
MCHC RBC AUTO-ENTMCNC: 33.4 G/DL (ref 31.5–35.7)
MCV RBC AUTO: 93.5 FL (ref 79–97)
MICROALBUMIN/CREAT UR: NORMAL MG/G{CREAT}
MONOCYTES # BLD AUTO: 0.37 10*3/MM3 (ref 0.1–0.9)
MONOCYTES NFR BLD AUTO: 8.1 % (ref 5–12)
NEUTROPHILS NFR BLD AUTO: 2.67 10*3/MM3 (ref 1.7–7)
NEUTROPHILS NFR BLD AUTO: 58.6 % (ref 42.7–76)
NRBC BLD AUTO-RTO: 0 /100 WBC (ref 0–0.2)
PLATELET # BLD AUTO: 209 10*3/MM3 (ref 140–450)
PMV BLD AUTO: 10.5 FL (ref 6–12)
POTASSIUM SERPL-SCNC: 5 MMOL/L (ref 3.5–5.2)
PROT SERPL-MCNC: 6.3 G/DL (ref 6–8.5)
PSA SERPL-MCNC: 0.93 NG/ML (ref 0–4)
RBC # BLD AUTO: 5.06 10*6/MM3 (ref 4.14–5.8)
SODIUM SERPL-SCNC: 142 MMOL/L (ref 136–145)
TRIGL SERPL-MCNC: 62 MG/DL (ref 0–150)
VLDLC SERPL-MCNC: 13 MG/DL (ref 5–40)
WBC NRBC COR # BLD AUTO: 4.56 10*3/MM3 (ref 3.4–10.8)

## 2025-02-10 PROCEDURE — 85025 COMPLETE CBC W/AUTO DIFF WBC: CPT

## 2025-02-10 PROCEDURE — 82570 ASSAY OF URINE CREATININE: CPT

## 2025-02-10 PROCEDURE — 80061 LIPID PANEL: CPT

## 2025-02-10 PROCEDURE — G0103 PSA SCREENING: HCPCS

## 2025-02-10 PROCEDURE — 82043 UR ALBUMIN QUANTITATIVE: CPT

## 2025-02-10 PROCEDURE — 80053 COMPREHEN METABOLIC PANEL: CPT

## 2025-02-10 NOTE — TELEPHONE ENCOUNTER
Specialty Pharmacy Patient Management Program  Per Protocol Prescription Order/Refill     Patient currently fills medications at [facility] and is enrolled in an Endocrinology Patient Management Program.     Requested Prescriptions     Pending Prescriptions Disp Refills    metFORMIN (GLUCOPHAGE) 1000 MG tablet 180 tablet 3     Sig: Take 1 tablet by mouth 2 (Two) Times a Day With Meals.     Prescription orders above were sent to the pharmacy per Collaborative Care Agreement Protocol.         Rick Lu Pharm.D.  Clinical Specialty Pharmacist, Endocrinology  15:11 EST 02/10/25

## 2025-02-11 ENCOUNTER — SPECIALTY PHARMACY (OUTPATIENT)
Dept: ENDOCRINOLOGY | Facility: CLINIC | Age: 75
End: 2025-02-11
Payer: MEDICARE

## 2025-02-11 NOTE — PROGRESS NOTES
" Specialty Pharmacy Patient Management Program  Endocrinology Refill Outreach      Francisco \"Babak\" is a 74 y.o. male contacted today regarding refills of his medication(s).    Specialty medication(s) and dose(s) confirmed: none    Refill Questions      Flowsheet Row Most Recent Value   Changes to allergies? No   Changes to medications? No   New conditions or infections since last clinic visit No   Unplanned office visit, urgent care, ED, or hospital admission in the last 4 weeks  No   How does patient/caregiver feel medication is working? Very good   Financial problems or insurance changes  No   Since the previous refill, were any specialty medication doses or scheduled injections missed or delayed?  No   Does this patient require a clinical escalation to a pharmacist? No          Delivery Questions      Flowsheet Row Most Recent Value   Delivery method [employer]   Delivery address verified with patient/caregiver? Yes   Delivery address Home   Number of medications in delivery 1   Medication(s) being filled and delivered metFORMIN HCl (GLUCOPHAGE)   Doses left of specialty medications 3 day   Copay verified? Yes   Copay amount 11.56   Copay form of payment Credit/debit on file   Ship Date 02/11   Delivery Date Selection 02/12/25   Signature Required No            Follow-Up: 90d    Funmi Recio CPhT  Pharmacy Care Coordinator, Endocrinology  2/11/2025  09:45 EST          "

## 2025-02-14 ENCOUNTER — OFFICE VISIT (OUTPATIENT)
Dept: INTERNAL MEDICINE | Facility: CLINIC | Age: 75
End: 2025-02-14
Payer: MEDICARE

## 2025-02-14 VITALS
HEART RATE: 80 BPM | SYSTOLIC BLOOD PRESSURE: 120 MMHG | OXYGEN SATURATION: 99 % | HEIGHT: 72 IN | DIASTOLIC BLOOD PRESSURE: 60 MMHG | BODY MASS INDEX: 23.38 KG/M2 | TEMPERATURE: 97.1 F | WEIGHT: 172.6 LBS

## 2025-02-14 DIAGNOSIS — E78.2 MIXED HYPERLIPIDEMIA: Chronic | ICD-10-CM

## 2025-02-14 DIAGNOSIS — F41.1 ANXIETY STATE: ICD-10-CM

## 2025-02-14 DIAGNOSIS — I10 ESSENTIAL HYPERTENSION: Chronic | ICD-10-CM

## 2025-02-14 DIAGNOSIS — Z00.00 MEDICARE ANNUAL WELLNESS VISIT, SUBSEQUENT: Primary | ICD-10-CM

## 2025-02-14 DIAGNOSIS — E11.65 TYPE 2 DIABETES MELLITUS WITH HYPERGLYCEMIA, WITHOUT LONG-TERM CURRENT USE OF INSULIN: Chronic | ICD-10-CM

## 2025-02-14 PROCEDURE — 1160F RVW MEDS BY RX/DR IN RCRD: CPT | Performed by: INTERNAL MEDICINE

## 2025-02-14 PROCEDURE — 3074F SYST BP LT 130 MM HG: CPT | Performed by: INTERNAL MEDICINE

## 2025-02-14 PROCEDURE — 3078F DIAST BP <80 MM HG: CPT | Performed by: INTERNAL MEDICINE

## 2025-02-14 PROCEDURE — 1126F AMNT PAIN NOTED NONE PRSNT: CPT | Performed by: INTERNAL MEDICINE

## 2025-02-14 PROCEDURE — 1159F MED LIST DOCD IN RCRD: CPT | Performed by: INTERNAL MEDICINE

## 2025-02-14 PROCEDURE — G0439 PPPS, SUBSEQ VISIT: HCPCS | Performed by: INTERNAL MEDICINE

## 2025-02-14 NOTE — PROGRESS NOTES
Subjective   The ABCs of the Annual Wellness Visit  Medicare Wellness Visit      Francisco Gonzalez is a 74 y.o. patient who presents for a Medicare Wellness Visit.    The following portions of the patient's history were reviewed and   updated as appropriate: allergies, current medications, past family history, past medical history, past social history, past surgical history, and problem list.    Compared to one year ago, the patient's physical   health is the same.  Compared to one year ago, the patient's mental   health is the same.    Recent Hospitalizations:  He was not admitted to the hospital during the last year.     Current Medical Providers:  Patient Care Team:  Newton Cruz MD as PCP - General (Internal Medicine)  Gisselle Izaguirre PA-C as Physician Assistant (Internal Medicine)  Honey Phillips PA as Physician Assistant (Endocrinology)  Lauren Ramos MD as Consulting Physician (Cardiology)    Outpatient Medications Prior to Visit   Medication Sig Dispense Refill    Accu-Chek FastClix Lancets misc Use to check blood sugar daily dx e11.65 100 each 3    Accu-Chek FastClix Lancets misc USE TO CHECK BLOOD SUGAR EVERY DAY 60 each 0    Accu-Chek Guide test strip USE TO TEST BLOOD SUGAR AS DIRECTED EVERY  each 3    alfuzosin (UROXATRAL) 10 MG 24 hr tablet Take 1 tablet by mouth Daily. 90 tablet 3    ALPRAZolam (XANAX) 0.5 MG tablet Take 1 tablet by mouth At Night As Needed for Anxiety or Sleep. 30 tablet 2    aspirin 81 MG tablet Take 1 tablet by mouth Daily.      atorvastatin (LIPITOR) 20 MG tablet Take 1 tablet by mouth Daily. 90 tablet 1    Continuous Glucose Sensor (FreeStyle Edyta 3 Plus Sensor) Use 1 sensor as directed and change every 15 days. 6 each 3    Cream Base cream Hydrocortisone 2.5%-nitroglycerin 0.2% ointment: Apply a small amount to the appropriate area twice a day as needed 30 g 1    cyclobenzaprine (FLEXERIL) 10 MG tablet Take 1 tablet by mouth 2 (Two)  Times a Day As Needed for Muscle Spasms. 30 tablet 3    empagliflozin (Jardiance) 25 MG tablet tablet Take 1 tablet by mouth Daily. 90 tablet 3    Hydrocortisone, Perianal, (ANUSOL-HC) 2.5 % rectal cream Insert  into the rectum 2 (Two) Times a Day. (Patient taking differently: Insert  into the rectum 2 (Two) Times a Day As Needed.) 30 g 2    metFORMIN (GLUCOPHAGE) 1000 MG tablet Take 1 tablet by mouth 2 (Two) Times a Day With Meals. 180 tablet 3    Mirabegron ER (MYRBETRIQ) 50 MG tablet sustained-release 24 hour 24 hr tablet Take 1 tablet by mouth Daily. 30 tablet 5    Multiple Vitamins-Minerals (MULTIVITAMIN ADULTS 50+ PO) Take 1 tablet by mouth Daily.      ondansetron (ZOFRAN) 4 MG tablet Take 1 tablet by mouth Every 8 (Eight) Hours As Needed for Nausea. 30 tablet 2    tadalafil (Cialis) 10 MG tablet Take 1 tablet by mouth Daily. (Patient taking differently: Take 1 tablet by mouth Daily As Needed.) 30 tablet 5    Triamcinolone Acetonide (Nasacort Allergy 24HR) 55 MCG/ACT nasal inhaler Administer 1-2 sprays into each nostril as directed by provider 2 (Two) Times a Day for 2 weeks, THEN only as needed thereafter 16.9 mL 6    Accu-Chek FastClix Lancets misc 1 each by Other route Daily.      glucose blood test strip USE TO TEST BLOOD SUGAR AS DIRECTED EVERY DAY      methylPREDNISolone (Medrol) 4 MG dose pack Take as directed on package instructions 21 tablet 0     No facility-administered medications prior to visit.     No opioid medication identified on active medication list. I have reviewed chart for other potential  high risk medication/s and harmful drug interactions in the elderly.      Aspirin is on active medication list. Aspirin use is indicated based on review of current medical condition/s. Pros and cons of this therapy have been discussed today. Benefits of this medication outweigh potential harm.  Patient has been encouraged to continue taking this medication.  .      Patient Active Problem List  "  Diagnosis    Essential hypertension    Mixed hyperlipidemia    Asthma    Sensorineural hearing loss (SNHL) of both ears    Allergic rhinitis    Type 2 diabetes mellitus with hyperglycemia, without long-term current use of insulin    Hypertrophy of prostate without urinary obstruction    Anxiety state    Annual physical exam    Other chest pain    Lymphadenopathy     Advance Care Planning Advance Directive is on file.  ACP discussion was held with the patient during this visit. Patient has an advance directive in EMR which is still valid.             Objective   Vitals:    02/14/25 1326   BP: 120/60   BP Location: Left arm   Patient Position: Sitting   Cuff Size: Adult   Pulse: 80   Temp: 97.1 °F (36.2 °C)   TempSrc: Infrared   SpO2: 99%   Weight: 78.3 kg (172 lb 9.6 oz)   Height: 182.9 cm (72.01\")   PainSc: 0-No pain       Estimated body mass index is 23.4 kg/m² as calculated from the following:    Height as of this encounter: 182.9 cm (72.01\").    Weight as of this encounter: 78.3 kg (172 lb 9.6 oz).    BMI is within normal parameters. No other follow-up for BMI required.           Does the patient have evidence of cognitive impairment? No  Lab Results   Component Value Date    TRIG 62 02/10/2025    HDL 68 (H) 02/10/2025    LDL 70 02/10/2025    VLDL 13 02/10/2025    HGBA1C 7.0 (A) 12/06/2024                                                                                                Health  Risk Assessment    Smoking Status:  Social History     Tobacco Use   Smoking Status Never    Passive exposure: Past   Smokeless Tobacco Never     Alcohol Consumption:  Social History     Substance and Sexual Activity   Alcohol Use Yes    Alcohol/week: 1.0 standard drink of alcohol    Types: 1 Cans of beer per week    Comment: occas       Fall Risk Screen  STEADI Fall Risk Assessment was completed, and patient is at LOW risk for falls.Assessment completed on:2/14/2025    Depression Screening   Little interest or pleasure in " doing things? Not at all   Feeling down, depressed, or hopeless? Not at all   PHQ-2 Total Score 0      Health Habits and Functional and Cognitive Screenin/7/2025    10:49 AM   Functional & Cognitive Status   Do you have difficulty preparing food and eating? No    Do you have difficulty bathing yourself, getting dressed or grooming yourself? No    Do you have difficulty using the toilet? No    Do you have difficulty moving around from place to place? No    Do you have trouble with steps or getting out of a bed or a chair? No    Current Diet Well Balanced Diet    Dental Exam Up to date    Eye Exam Up to date    Exercise (times per week) 4 times per week    Current Exercises Include Walking;Weightlifting    Do you need help using the phone?  No    Are you deaf or do you have serious difficulty hearing?  No    Do you need help to go to places out of walking distance? No    Do you need help shopping? No    Do you need help preparing meals?  No    Do you need help with housework?  No    Do you need help with laundry? No    Do you need help taking your medications? No    Do you need help managing money? No    Do you ever drive or ride in a car without wearing a seat belt? No    Have you felt unusual stress, anger or loneliness in the last month? No    Who do you live with? Spouse    If you need help, do you have trouble finding someone available to you? No    Have you been bothered in the last four weeks by sexual problems? No    Do you have difficulty concentrating, remembering or making decisions? No        Patient-reported           Age-appropriate Screening Schedule:  Refer to the list below for future screening recommendations based on patient's age, sex and/or medical conditions. Orders for these recommended tests are listed in the plan section. The patient has been provided with a written plan.    Health Maintenance List  Health Maintenance   Topic Date Due    URINE MICROALBUMIN-CREATININE RATIO (uACR)   12/31/2021    TDAP/TD VACCINES (3 - Td or Tdap) 05/08/2024    DIABETIC EYE EXAM  12/14/2024    ANNUAL WELLNESS VISIT  02/09/2025    HEMOGLOBIN A1C  06/06/2025    LIPID PANEL  02/10/2026    DIABETIC FOOT EXAM  02/14/2026    COLORECTAL CANCER SCREENING  01/27/2027    HEPATITIS C SCREENING  Completed    COVID-19 Vaccine  Completed    INFLUENZA VACCINE  Completed    Pneumococcal Vaccine 50+  Completed    ZOSTER VACCINE  Completed                                                                                                                                                CMS Preventative Services Quick Reference  Risk Factors Identified During Encounter  None Identified    The above risks/problems have been discussed with the patient.  Pertinent information has been shared with the patient in the After Visit Summary.  An After Visit Summary and PPPS were made available to the patient.    Follow Up:   Next Medicare Wellness visit to be scheduled in 1 year.     No opioid medication identified on active medication list. I have reviewed chart for other potential  high risk medication/s and harmful drug interactions in the elderly.      Additional E&M Note during same encounter follows:  Patient has additional, significant, and separately identifiable condition(s)/problem(s) that require work above and beyond the Medicare Wellness Visit     Chief Complaint  Medicare Wellness-subsequent and Hyperlipidemia    Subjective    HPI         The patient is a 74-year-old male who presents for a subsequent Medicare annual wellness examination and follow-up of diabetes, hypertension, hyperlipidemia, and anxiety.    He has been utilizing a glucose monitor to manage his diabetes, which he finds beneficial as it allows him to observe the immediate impact of his dietary choices on his blood sugar levels without the need for frequent finger pricks. He reports no significant fluctuations in his blood sugar levels. He had an eye examination in  "December 2024 with Dr. Jules Brown.    He underwent a UroLift procedure in June 2023. Despite this, he continues to experience urinary urgency, a symptom that remains unexplained by his urologist. He has been managing this by ensuring he empties his bladder before leaving his residence. He is uncertain about the efficacy of Myrbetriq in managing his symptoms but is hesitant to discontinue its use. He was prescribed Myrbetriq.    He has been experiencing a runny nose due to a cold.    He has been under the care of a urologist, Dr. Roca, who performed a digital rectal examination during their last visit.    Supplemental Information  He notes that his eosinophil count consistently remains elevated, although he is unsure of the significance of this finding.    MEDICATIONS  Jardiance, atorvastatin, alprazolam, Myrbetriq          Objective   Vital Signs:  /60 (BP Location: Left arm, Patient Position: Sitting, Cuff Size: Adult)   Pulse 80   Temp 97.1 °F (36.2 °C) (Infrared)   Ht 182.9 cm (72.01\")   Wt 78.3 kg (172 lb 9.6 oz)   SpO2 99%   BMI 23.40 kg/m²   Physical Exam  Vitals reviewed.   Constitutional:       Appearance: Normal appearance. He is well-developed.   HENT:      Head: Normocephalic and atraumatic.      Right Ear: Tympanic membrane and ear canal normal.      Left Ear: Tympanic membrane and ear canal normal.      Mouth/Throat:      Pharynx: Oropharynx is clear. No posterior oropharyngeal erythema.   Eyes:      Extraocular Movements: Extraocular movements intact.      Conjunctiva/sclera: Conjunctivae normal.      Pupils: Pupils are equal, round, and reactive to light.   Neck:      Thyroid: No thyromegaly.      Vascular: No carotid bruit.   Cardiovascular:      Rate and Rhythm: Normal rate and regular rhythm.      Pulses:           Dorsalis pedis pulses are 1+ on the right side and 1+ on the left side.      Heart sounds: Normal heart sounds. No murmur heard.     No friction rub. No gallop.   Pulmonary: "      Effort: Pulmonary effort is normal.      Breath sounds: Normal breath sounds.   Abdominal:      General: Bowel sounds are normal.      Palpations: Abdomen is soft. There is no mass.      Tenderness: There is no abdominal tenderness.   Musculoskeletal:      Cervical back: Normal range of motion and neck supple.      Right lower leg: No edema.      Left lower leg: No edema.      Right foot: No deformity.      Left foot: No deformity.   Feet:      Right foot:      Protective Sensation: 5 sites tested.  5 sites sensed.      Skin integrity: Skin integrity normal.      Left foot:      Protective Sensation: 5 sites tested.  5 sites sensed.      Skin integrity: Skin integrity normal.      Comments: Sensation in both feet intact to monofilament.     Diabetic Foot Exam Performed and Monofilament Test Performed    Lymphadenopathy:      Cervical: No cervical adenopathy.   Skin:     General: Skin is warm and dry.      Comments: There is an erythematous rash slightly crusty in the presacral area   Neurological:      Mental Status: He is alert and oriented to person, place, and time.      Cranial Nerves: No cranial nerve deficit.      Motor: No weakness.      Gait: Gait normal.   Psychiatric:         Mood and Affect: Mood normal.         Behavior: Behavior normal.                       Results  Laboratory Studies  A1c was 7.0 in December 2024. PSA is 0.9.              Assessment and Plan        1. Health maintenance.  His overall health status is commendable, supported by excellent lifestyle practices. He is current with his colorectal cancer screening.    2. Diabetes.  His most recent A1c level was recorded at 7.0. He is also current with his eye examinations. A foot examination was conducted during this visit. He will maintain his follow-up appointments with endocrinology and continue his Jardiance regimen.    3. Hypertension.  His blood pressure is effectively managed through nonpharmacologic therapy.    4.  Hyperlipidemia.  His lipid levels are well-regulated with the combination of atorvastatin and a healthy diet.    5. Anxiety/insomnia.  His anxiety and insomnia are well-managed with as-needed alprazolam.    6. Nonspecific upper abdominal discomfort.  This issue has now resolved. He had a consultation with cardiology and underwent an EKG, which showed no signs of cardiac dysfunction. He is scheduled for a follow-up visit in 6 months.    7. Urinary urgency.  He continues to experience urinary urgency despite being on Myrbetriq. He will continue taking Myrbetriq as it is on his formulary and has decided not to stop the medication at this time.    8. Cold.  He has a runny nose.    PROCEDURE  The patient underwent a UroLift procedure in June 2023.            Follow Up   Return in about 6 months (around 8/14/2025) for follow up.  Patient was given instructions and counseling regarding his condition or for health maintenance advice. Please see specific information pulled into the AVS if appropriate.  Patient or patient representative verbalized consent for the use of Ambient Listening during the visit with  Newton Cruz MD for chart documentation. 2/22/2025  14:32 EST

## 2025-04-15 ENCOUNTER — SPECIALTY PHARMACY (OUTPATIENT)
Dept: GENERAL RADIOLOGY | Facility: HOSPITAL | Age: 75
End: 2025-04-15
Payer: MEDICARE

## 2025-04-15 RX ORDER — HYDROCHLOROTHIAZIDE 12.5 MG/1
1 CAPSULE ORAL TAKE AS DIRECTED
Qty: 6 EACH | Refills: 1 | Status: SHIPPED | OUTPATIENT
Start: 2025-04-15

## 2025-04-15 NOTE — PROGRESS NOTES
Specialty Pharmacy Patient Management Program  Per Protocol Prescription Order/Refill     Patient currently fills medications at [facility] and is enrolled in an Endocrinology Patient Management Program.     Requested Prescriptions     Pending Prescriptions Disp Refills    Continuous Glucose Sensor (FreeStyle Edyta 3 Plus Sensor) 6 each 1     Sig: Use 1 sensor as directed and change every 15 days.     Prescription orders above were sent to the pharmacy per Collaborative Care Agreement Protocol.     Hu Rizo, PharmD  Clinical Specialty Pharmacist, Endocrinology  4/15/2025  10:30 EDT

## 2025-04-30 ENCOUNTER — SPECIALTY PHARMACY (OUTPATIENT)
Dept: ENDOCRINOLOGY | Facility: CLINIC | Age: 75
End: 2025-04-30
Payer: MEDICARE

## 2025-04-30 NOTE — PROGRESS NOTES
"   Specialty Pharmacy Patient Management Program  Refill Outreach     Francisco \"Babak\" was contacted today regarding refills of their medication(s). Jardiance.    Refill Questions      Flowsheet Row Most Recent Value   Changes to allergies? No   Changes to medications? No   New conditions or infections since last clinic visit No   Unplanned office visit, urgent care, ED, or hospital admission in the last 4 weeks  No   How does patient/caregiver feel medication is working? Good   Financial problems or insurance changes  No   Since the previous refill, were any specialty medication doses or scheduled injections missed or delayed?  No   Does this patient require a clinical escalation to a pharmacist? No            Delivery Questions      Flowsheet Row Most Recent Value   Delivery method  at Pharmacy   Delivery address verified with patient/caregiver? Yes   Delivery address [facility]   Number of medications in delivery 2   Medication(s) being filled and delivered metFORMIN HCl (GLUCOPHAGE), Empagliflozin (JARDIANCE)   Doses left of specialty medications 1   Copay amount $141.00   Copay form of payment Pay at pickup   Delivery Date Selection 04/30/25   Signature Required No   Do you consent to receive electronic handouts?  Yes                 Follow-up: 90 day(s)     Violet Cohen, Pharmacy Technician  4/30/2025  09:04 EDT    "

## 2025-06-04 ENCOUNTER — SPECIALTY PHARMACY (OUTPATIENT)
Dept: ENDOCRINOLOGY | Facility: CLINIC | Age: 75
End: 2025-06-04
Payer: MEDICARE

## 2025-06-04 NOTE — PROGRESS NOTES
Specialty Pharmacy Patient Management Program  Endocrinology Reassessment     Francisco Gonzalez was referred by an Endocrinology provider to the Endocrinology Patient Management program offered by [facility] for Type 2 Diabetes. A follow-up outreach was conducted, including assessment of continued therapy appropriateness, medication adherence, and side effect incidence and management for Jardiance.    Changes to Insurance Coverage or Financial Support  No changes to insurance.     Relevant Past Medical History and Comorbidities  Relevant medical history and concomitant health conditions were discussed with the patient. The patient's chart has been reviewed for relevant past medical history and comorbid health conditions and updated as necessary.   Past Medical History:   Diagnosis Date    Anxiety     Benign prostatic hyperplasia 2014    Chicken pox     Colonic polyp     Erectile dysfunction     Essential hypertension     HL (hearing loss) 2016    Hydrocele 2001    left; repaired    Hyperlipidemia     Measles     Multiple endocrine neoplasia     Quadriceps tendon rupture 2010    Type 2 diabetes mellitus      Social History     Socioeconomic History    Marital status:    Tobacco Use    Smoking status: Never     Passive exposure: Past    Smokeless tobacco: Never   Vaping Use    Vaping status: Never Used   Substance and Sexual Activity    Alcohol use: Yes     Alcohol/week: 1.0 standard drink of alcohol     Types: 1 Cans of beer per week     Comment: occas    Drug use: No    Sexual activity: Yes     Partners: Female     Birth control/protection: Vasectomy     Problem list reviewed by Rick Lu RPH on 6/4/2025 at 12:36 PM    Hospitalizations and Urgent Care Since Last Assessment  ED Visits, Admissions, or Hospitalizations: None  Urgent Office Visits: None    Allergies  Known allergies and reactions were discussed with the patient. The patient's chart has been reviewed for allergy  information and updated as necessary.   No Known Allergies  Allergies reviewed by Rick Lu RPH on 6/4/2025 at 12:36 PM    Relevant Laboratory Values  Relevant laboratory values were discussed with the patient. The following specialty medication dose adjustment(s) are recommended: None  A1C Last 3 Results          8/21/2024    13:45 12/6/2024    14:34   HGBA1C Last 3 Results   Hemoglobin A1C 7.4  7.0      Lab Results   Component Value Date    HGBA1C 7.0 (A) 12/06/2024     Lab Results   Component Value Date    GLUCOSE 150 (H) 02/10/2025    CALCIUM 9.5 02/10/2025     02/10/2025    K 5.0 02/10/2025    CO2 26.2 02/10/2025     02/10/2025    BUN 22 02/10/2025    CREATININE 1.14 02/10/2025    EGFRIFNONA 61 01/05/2022    BCR 19.3 02/10/2025    ANIONGAP 9.8 02/10/2025     Lab Results   Component Value Date    CHOL 151 02/10/2025    TRIG 62 02/10/2025    HDL 68 (H) 02/10/2025    LDL 70 02/10/2025     Microalbumin          2/10/2025    08:43   Microalbumin   Microalbumin, Urine <1.2      Current Medication List  This medication list has been reviewed with the patient and evaluated for any interactions or necessary modifications/recommendations, and updated to include all prescription medications, OTC medications, and supplements the patient is currently taking.  This list reflects what is contained in the patient's profile, which has also been marked as reviewed to communicate to other providers it is the most up to date version of the patient's current medication therapy.     Current Outpatient Medications:     Accu-Chek FastClix Lancets misc, Use to check blood sugar daily dx e11.65, Disp: 100 each, Rfl: 3    Accu-Chek FastClix Lancets misc, USE TO CHECK BLOOD SUGAR EVERY DAY, Disp: 60 each, Rfl: 0    Accu-Chek Guide test strip, USE TO TEST BLOOD SUGAR AS DIRECTED EVERY DAY, Disp: 100 each, Rfl: 3    alfuzosin (UROXATRAL) 10 MG 24 hr tablet, Take 1 tablet by mouth Daily., Disp: 90 tablet, Rfl: 3     ALPRAZolam (XANAX) 0.5 MG tablet, Take 1 tablet by mouth At Night As Needed for Anxiety or Sleep., Disp: 30 tablet, Rfl: 2    aspirin 81 MG tablet, Take 1 tablet by mouth Daily., Disp: , Rfl:     atorvastatin (LIPITOR) 20 MG tablet, Take 1 tablet by mouth Daily., Disp: 90 tablet, Rfl: 1    Continuous Glucose Sensor (FreeStyle Edyta 3 Plus Sensor), Use 1 sensor as directed and change every 15 days., Disp: 6 each, Rfl: 1    Cream Base cream, Hydrocortisone 2.5%-nitroglycerin 0.2% ointment: Apply a small amount to the appropriate area twice a day as needed, Disp: 30 g, Rfl: 1    cyclobenzaprine (FLEXERIL) 10 MG tablet, Take 1 tablet by mouth 2 (Two) Times a Day As Needed for Muscle Spasms., Disp: 30 tablet, Rfl: 3    empagliflozin (Jardiance) 25 MG tablet tablet, Take 1 tablet by mouth Daily., Disp: 90 tablet, Rfl: 3    Hydrocortisone, Perianal, (ANUSOL-HC) 2.5 % rectal cream, Insert  into the rectum 2 (Two) Times a Day. (Patient taking differently: Insert  into the rectum 2 (Two) Times a Day As Needed.), Disp: 30 g, Rfl: 2    metFORMIN (GLUCOPHAGE) 1000 MG tablet, Take 1 tablet by mouth 2 (Two) Times a Day With Meals., Disp: 180 tablet, Rfl: 3    Mirabegron ER (MYRBETRIQ) 50 MG tablet sustained-release 24 hour 24 hr tablet, Take 1 tablet by mouth Daily., Disp: 30 tablet, Rfl: 5    Multiple Vitamins-Minerals (MULTIVITAMIN ADULTS 50+ PO), Take 1 tablet by mouth Daily., Disp: , Rfl:     ondansetron (ZOFRAN) 4 MG tablet, Take 1 tablet by mouth Every 8 (Eight) Hours As Needed for Nausea., Disp: 30 tablet, Rfl: 2    tadalafil (Cialis) 10 MG tablet, Take 1 tablet by mouth Daily. (Patient taking differently: Take 1 tablet by mouth Daily As Needed.), Disp: 30 tablet, Rfl: 5    Triamcinolone Acetonide (Nasacort Allergy 24HR) 55 MCG/ACT nasal inhaler, Administer 1-2 sprays into each nostril as directed by provider 2 (Two) Times a Day for 2 weeks, THEN only as needed thereafter, Disp: 16.9 mL, Rfl: 6    Medicines reviewed by  Rick Lu [facility] on 6/4/2025 at 12:36 PM    Drug Interactions  No Clinically Significant DDIs Were Identified at Present Time Upon Marking Medications Reviewed      Recommended Medications Assessment  Aspirin: Currently Taking   Statin: Currently Taking   ACEi/ARB: Not Taking Currently    Adverse Drug Reactions  Medication tolerability: Tolerating with no to minimal ADRs  Medication plan: Continue therapy with normal follow-up  Plan for ADR Management: No adverse drug reactions reported.     Adherence, Self-Administration, and Current Therapy Problems  Adherence related to the patient's specialty therapy was discussed with the patient. The Adherence segment of this outreach has been reviewed and updated.     Adherence Questions  Linked Medication(s) Assessed: Empagliflozin (JARDIANCE)  On average, how many doses/injections does the patient miss per month?: 0  What are the identified reasons for non-adherence or missed doses? : no problems identified  What is the estimated medication adherence level?: %  Based on the patient/caregiver response and refill history, does this patient require an MTP to track adherence improvements?: no    Additional Barriers to Patient Self-Administration: None  Methods for Supporting Patient Self-Administration: n/a    Open Medication Therapy Problems  No medication therapy recommendations to display    Goals of Therapy  Goals related to the patient's specialty therapy were discussed with the patient. The Patient Goals segment of this outreach has been reviewed and updated.   Goals Addressed Today        Specialty Pharmacy General Goal      A1C < 7 %     1/4/23: On Enrollment, most recent A1C was 6.7%     5/20/24: A1C 7.3% today, so slightly above goal.  Patient feels diet could be better, will try to make adjustments to lifestyle. Provider agreeable to plan before adding additional therapy, so no changes today.     8/21/24: A1c 7.4% today, slightly above goal. Planning to use  a Edyta for a couple of weeks to see when the glucoses are elevated. No changes today. MS    12/6/24: A1c 7%. Having some post prandial highs in the morning. No changes today. MS     06/04/2025:  Phone reassessment:  Patient tolerating medication well.  No refills today.  Will yan on track and follow up at next appointment. RS              Quality of Life Assessment   Quality of Life related to the patient's enrollment in the patient management program and services provided was discussed with the patient. The QOL segment of this outreach has been reviewed and updated.  Quality of Life Improvement Scale: 8-Moderately better    Reassessment Plan & Follow-Up  1. Medication Therapy Changes: No changes  2. Related Plans, Therapy Recommendations, or Issues to Be Addressed: None  3. Pharmacist to perform regular assessments no more than (6) months from the previous assessment.  4. Care Coordinator to set up future refill outreaches, coordinate prescription delivery, and escalate clinical questions to pharmacist.    Attestation  Therapeutic appropriateness: Appropriate   I attest the patient was actively involved in and has agreed to the above plan of care.  If the prescribed therapy is at any point deemed not appropriate based on the current or future assessments, a consultation will be initiated with the patient's specialty care provider to determine the best course of action. The revised plan of therapy will be documented along with any required assessments and/or additional patient education provided.     Nicola Lu, Cezar  Clinical Specialty Pharmacist, Endocrinology  6/4/2025  12:40 EDT

## 2025-06-17 ENCOUNTER — SPECIALTY PHARMACY (OUTPATIENT)
Dept: ENDOCRINOLOGY | Facility: CLINIC | Age: 75
End: 2025-06-17
Payer: MEDICARE

## 2025-06-17 ENCOUNTER — OFFICE VISIT (OUTPATIENT)
Dept: ENDOCRINOLOGY | Facility: CLINIC | Age: 75
End: 2025-06-17
Payer: MEDICARE

## 2025-06-17 VITALS
WEIGHT: 170.4 LBS | OXYGEN SATURATION: 97 % | DIASTOLIC BLOOD PRESSURE: 68 MMHG | BODY MASS INDEX: 23.08 KG/M2 | HEART RATE: 78 BPM | SYSTOLIC BLOOD PRESSURE: 112 MMHG | HEIGHT: 72 IN

## 2025-06-17 DIAGNOSIS — E78.2 MIXED HYPERLIPIDEMIA: Chronic | ICD-10-CM

## 2025-06-17 DIAGNOSIS — I10 ESSENTIAL HYPERTENSION: Chronic | ICD-10-CM

## 2025-06-17 DIAGNOSIS — E11.65 TYPE 2 DIABETES MELLITUS WITH HYPERGLYCEMIA, WITHOUT LONG-TERM CURRENT USE OF INSULIN: Primary | ICD-10-CM

## 2025-06-17 LAB
EXPIRATION DATE: ABNORMAL
EXPIRATION DATE: NORMAL
GLUCOSE BLDC GLUCOMTR-MCNC: 125 MG/DL (ref 70–130)
HBA1C MFR BLD: 7.1 % (ref 4.5–5.7)
Lab: ABNORMAL
Lab: NORMAL

## 2025-06-17 NOTE — PROGRESS NOTES
Specialty Pharmacy Patient Management Program  Endocrinology Reassessment     Francisco Gonzalez was referred by an Endocrinology provider to the Endocrinology Patient Management program offered by [facility] for Type 2 Diabetes. A follow-up outreach was conducted, including assessment of continued therapy appropriateness, medication adherence, and side effect incidence and management for Jardiance.    Changes to Insurance Coverage or Financial Support  None reported    Relevant Past Medical History and Comorbidities  Relevant medical history and concomitant health conditions were discussed with the patient. The patient's chart has been reviewed for relevant past medical history and comorbid health conditions and updated as necessary.   Past Medical History:   Diagnosis Date    Anxiety     Benign prostatic hyperplasia 2014    Chicken pox     Colonic polyp     Erectile dysfunction     Essential hypertension     HL (hearing loss) 2016    Hydrocele 2001    left; repaired    Hyperlipidemia     Measles     Multiple endocrine neoplasia     Quadriceps tendon rupture 2010    Type 2 diabetes mellitus      Social History     Socioeconomic History    Marital status:    Tobacco Use    Smoking status: Never     Passive exposure: Past    Smokeless tobacco: Never   Vaping Use    Vaping status: Never Used   Substance and Sexual Activity    Alcohol use: Yes     Alcohol/week: 1.0 standard drink of alcohol     Types: 1 Cans of beer per week     Comment: occas    Drug use: No    Sexual activity: Yes     Partners: Female     Birth control/protection: Vasectomy     Problem list reviewed by Janki Villar, PharmD on 6/17/2025 at  1:15 PM    Hospitalizations and Urgent Care Since Last Assessment  ED Visits, Admissions, or Hospitalizations: none reported  Urgent Office Visits: none reported    Allergies  Known allergies and reactions were discussed with the patient. The patient's chart has been reviewed  for allergy information and updated as necessary.   No Known Allergies  Allergies reviewed by Janki Villar, PharmD on 6/17/2025 at  1:15 PM    Relevant Laboratory Values  Relevant laboratory values were discussed with the patient. The following specialty medication dose adjustment(s) are recommended: no changes today  A1C Last 3 Results          8/21/2024    13:45 12/6/2024    14:34 6/17/2025    11:57   HGBA1C Last 3 Results   Hemoglobin A1C 7.4  7.0  7.1      Lab Results   Component Value Date    HGBA1C 7.1 (A) 06/17/2025     Lab Results   Component Value Date    GLUCOSE 150 (H) 02/10/2025    CALCIUM 9.5 02/10/2025     02/10/2025    K 5.0 02/10/2025    CO2 26.2 02/10/2025     02/10/2025    BUN 22 02/10/2025    CREATININE 1.14 02/10/2025    EGFRIFNONA 61 01/05/2022    BCR 19.3 02/10/2025    ANIONGAP 9.8 02/10/2025     Lab Results   Component Value Date    CHOL 151 02/10/2025    TRIG 62 02/10/2025    HDL 68 (H) 02/10/2025    LDL 70 02/10/2025     Microalbumin          2/10/2025    08:43   Microalbumin   Microalbumin, Urine <1.2      Current Medication List  This medication list has been reviewed with the patient and evaluated for any interactions or necessary modifications/recommendations, and updated to include all prescription medications, OTC medications, and supplements the patient is currently taking.  This list reflects what is contained in the patient's profile, which has also been marked as reviewed to communicate to other providers it is the most up to date version of the patient's current medication therapy.     Current Outpatient Medications:     Accu-Chek FastClix Lancets misc, Use to check blood sugar daily dx e11.65, Disp: 100 each, Rfl: 3    Accu-Chek FastClix Lancets misc, USE TO CHECK BLOOD SUGAR EVERY DAY, Disp: 60 each, Rfl: 0    Accu-Chek Guide test strip, USE TO TEST BLOOD SUGAR AS DIRECTED EVERY DAY, Disp: 100 each, Rfl: 3    alfuzosin (UROXATRAL) 10 MG 24 hr tablet, Take 1  tablet by mouth Daily., Disp: 90 tablet, Rfl: 3    ALPRAZolam (XANAX) 0.5 MG tablet, Take 1 tablet by mouth At Night As Needed for Anxiety or Sleep., Disp: 30 tablet, Rfl: 2    aspirin 81 MG tablet, Take 1 tablet by mouth Daily., Disp: , Rfl:     atorvastatin (LIPITOR) 20 MG tablet, Take 1 tablet by mouth Daily., Disp: 90 tablet, Rfl: 1    Continuous Glucose Sensor (FreeStyle Edyta 3 Plus Sensor), Use 1 sensor as directed and change every 15 days., Disp: 6 each, Rfl: 1    Cream Base cream, Hydrocortisone 2.5%-nitroglycerin 0.2% ointment: Apply a small amount to the appropriate area twice a day as needed, Disp: 30 g, Rfl: 1    cyclobenzaprine (FLEXERIL) 10 MG tablet, Take 1 tablet by mouth 2 (Two) Times a Day As Needed for Muscle Spasms., Disp: 30 tablet, Rfl: 3    empagliflozin (Jardiance) 25 MG tablet tablet, Take 1 tablet by mouth Daily., Disp: 90 tablet, Rfl: 3    Hydrocortisone, Perianal, (ANUSOL-HC) 2.5 % rectal cream, Insert  into the rectum 2 (Two) Times a Day. (Patient taking differently: Insert  into the rectum 2 (Two) Times a Day As Needed.), Disp: 30 g, Rfl: 2    metFORMIN (GLUCOPHAGE) 1000 MG tablet, Take 1 tablet by mouth 2 (Two) Times a Day With Meals., Disp: 180 tablet, Rfl: 3    Mirabegron ER (MYRBETRIQ) 50 MG tablet sustained-release 24 hour 24 hr tablet, Take 1 tablet by mouth Daily., Disp: 30 tablet, Rfl: 5    Multiple Vitamins-Minerals (MULTIVITAMIN ADULTS 50+ PO), Take 1 tablet by mouth Daily., Disp: , Rfl:     ondansetron (ZOFRAN) 4 MG tablet, Take 1 tablet by mouth Every 8 (Eight) Hours As Needed for Nausea., Disp: 30 tablet, Rfl: 2    tadalafil (Cialis) 10 MG tablet, Take 1 tablet by mouth Daily. (Patient taking differently: Take 1 tablet by mouth Daily As Needed.), Disp: 30 tablet, Rfl: 5    Triamcinolone Acetonide (Nasacort Allergy 24HR) 55 MCG/ACT nasal inhaler, Administer 1-2 sprays into each nostril as directed by provider 2 (Two) Times a Day for 2 weeks, THEN only as needed thereafter,  Disp: 16.9 mL, Rfl: 6    Medicines reviewed by Janki Villar, PharmD on 6/17/2025 at  1:15 PM    Drug Interactions  No Clinically Significant DDIs Were Identified at Present Time Upon Marking Medications Reviewed      Recommended Medications Assessment  Aspirin: Currently Taking   Statin: Currently Taking   ACEi/ARB: Not Taking Currently    Adverse Drug Reactions  Medication tolerability: Tolerating with no to minimal ADRs  Medication plan: Continue therapy with normal follow-up  Plan for ADR Management: none indicated    Adherence, Self-Administration, and Current Therapy Problems  Adherence related to the patient's specialty therapy was discussed with the patient. The Adherence segment of this outreach has been reviewed and updated.     Adherence Questions  Linked Medication(s) Assessed: Empagliflozin (JARDIANCE)  On average, how many doses/injections does the patient miss per month?: 0  What are the identified reasons for non-adherence or missed doses? : no problems identified  What is the estimated medication adherence level?: (S) % (Jardiance PDC 98%)  Based on the patient/caregiver response and refill history, does this patient require an MTP to track adherence improvements?: no    Additional Barriers to Patient Self-Administration: none  Methods for Supporting Patient Self-Administration: none    Open Medication Therapy Problems  No medication therapy recommendations to display    Goals of Therapy  Goals related to the patient's specialty therapy were discussed with the patient. The Patient Goals segment of this outreach has been reviewed and updated.   Goals Addressed Today        Specialty Pharmacy General Goal      A1C < 7 %     A1C < 7 %     Lab Results Notes:   Component Value Date     HGBA1C 7.1 (A) 06/17/2025  Patient seen in clinic.  His A1c to 7.1% today.  He was disappointed as Edyta data slightly lower.  He is tolerating medication well with no adverse effects reported.  His metformin  recently reduced to 1 gram oral daily.  Will yan on track and follow up at next encounter.  sdg    HGBA1C 7.0 (A) 12/06/2024      Historical:  1/4/23: On Enrollment, most recent A1C was 6.7%   5/20/24: A1C 7.3% today, so slightly above goal.  Patient feels diet could be better, will try to make adjustments to lifestyle. Provider agreeable to plan before adding additional therapy, so no changes today.   8/21/24: A1c 7.4% today, slightly above goal. Planning to use a Edyta for a couple of weeks to see when the glucoses are elevated. No changes today. MS  12/6/24: A1c 7%. Having some post prandial highs in the morning. No changes today. MS   06/04/2025:  Phone reassessment:  Patient tolerating medication well.  No refills today.  Will yan on track and follow up at next appointment. RS              Quality of Life Assessment   Quality of Life related to the patient's enrollment in the patient management program and services provided was discussed with the patient. The QOL segment of this outreach has been reviewed and updated.  Quality of Life Improvement Scale: 8-Moderately better    Reassessment Plan & Follow-Up  1. Medication Therapy Changes: No medication changes today  2. Related Plans, Therapy Recommendations, or Issues to Be Addressed: Follow up at next encounter  3. Pharmacist to perform regular assessments no more than (6) months from the previous assessment.  4. Care Coordinator to set up future refill outreaches, coordinate prescription delivery, and escalate clinical questions to pharmacist.    Attestation  Therapeutic appropriateness: Appropriate   I attest the patient was actively involved in and has agreed to the above plan of care.  If the prescribed therapy is at any point deemed not appropriate based on the current or future assessments, a consultation will be initiated with the patient's specialty care provider to determine the best course of action. The revised plan of therapy will be documented  along with any required assessments and/or additional patient education provided.     Janki Villar, PharmD, LDE, CPT  Clinical Specialty Pharmacist, Endocrinology  6/17/2025  13:23 EDT

## 2025-06-17 NOTE — PROGRESS NOTES
"     Office Note      Date: 2025  Patient Name: Francisco Gonzalez  MRN: [id number]  : 1950    Chief Complaint   Patient presents with    Diabetes     Type II Diabetes Mellitus with Hyperglycemia, without Long-Term Current Use of Insulin    Hypertension     Essential    Hyperlipidemia     Mixed       History of Present Illness:   Francisco Gonzalez is a 74 y.o. male who presents for Diabetes type 2. Diagnosed in: . Treated in past with oral agents. Current treatments: metformin and jardiance. Number of insulin shots per day: none. Checks blood sugar 288 times a day - on FreeStyle Edyta 3. Has low blood sugar: no. Aspirin use: Yes. Statin use: Yes. ACE-I/ARB use: No. Changes in health since last visit: none. Last eye exam 2024.     Subjective      Diabetic Complications:  Eyes: No  Kidneys: No  Feet: No  Heart: No    Diet and Exercise:  Meals per day: 3  Minutes of exercise per week: 270 mins.    Review of Systems:   Review of Systems   Constitutional: Negative.    Cardiovascular: Negative.    Gastrointestinal: Negative.    Endocrine: Negative.        The following portions of the patient's history were reviewed and updated as appropriate: allergies, current medications, past family history, past medical history, past social history, past surgical history, and problem list.    Objective     Visit Vitals  /68 (BP Location: Left arm, Patient Position: Sitting, Cuff Size: Adult)   Pulse 78   Ht 182.9 cm (72.01\")   Wt 77.3 kg (170 lb 6.4 oz)   SpO2 97%   BMI 23.11 kg/m²       Physical Exam:  Physical Exam  Constitutional:       Appearance: Normal appearance.   Neurological:      Mental Status: He is alert.         Labs:    HbA1c  Lab Results   Component Value Date    HGBA1C 7.1 (A) 2025       CMP  Lab Results   Component Value Date    GLUCOSE 150 (H) 02/10/2025    BUN 22 02/10/2025    CREATININE 1.14 02/10/2025    EGFRIFNONA 61 2022    BCR 19.3 02/10/2025    K 5.0 02/10/2025    CO2 " "26.2 02/10/2025    CALCIUM 9.5 02/10/2025    AST 19 02/10/2025    ALT 20 02/10/2025        Lipid Panel  Lab Results   Component Value Date    HDL Cholesterol 68 (H) 02/10/2025    LDL Cholesterol  70 02/10/2025    LDL/HDL Ratio 1.04 02/10/2025    Triglycerides 62 02/10/2025        TSH  Lab Results   Component Value Date    TSH 1.380 05/20/2024        Hemoglobin A1C  No components found for: \"HGBA1C\"     Microalbumin/Creatinine  Lab Results   Component Value Date    MALBCRERATIO  02/10/2025      Comment:      Unable to calculate    MICROALBUR <1.2 02/10/2025           Assessment / Plan      Assessment & Plan:  Diagnoses and all orders for this visit:    1. Type 2 diabetes mellitus with hyperglycemia, without long-term current use of insulin (Primary)  Assessment & Plan:  Diabetes is stable.   Continue current treatment regimen.  Diabetes will be reassessed in 6 months.    FreeStyle Edyta 3 CGM was downloaded today.  Data was reviewed from 6/2/25 to 6/15/25.  This showed good overnight glucose control.  Having some mild postprandial spikes.  Time in range was 85%.    Orders:  -     POC Glucose, Blood  -     POC Glycosylated Hemoglobin (Hb A1C)    2. Essential hypertension  Assessment & Plan:  Hypertension is stable and controlled.  Continue current treatment regimen.  Blood pressure will be reassessed in 6 months.      3. Mixed hyperlipidemia  Assessment & Plan:  Continue statin.  Recent lipids at goal.        Current Outpatient Medications   Medication Instructions    Accu-Chek FastClix Lancets misc Use to check blood sugar daily dx e11.65    Accu-Chek FastClix Lancets misc USE TO CHECK BLOOD SUGAR EVERY DAY    Accu-Chek Guide test strip USE TO TEST BLOOD SUGAR AS DIRECTED EVERY DAY    alfuzosin (UROXATRAL) 10 mg, Oral, Daily    ALPRAZolam (XANAX) 0.5 mg, Oral, Nightly PRN    aspirin 81 mg, Daily    atorvastatin (LIPITOR) 20 mg, Oral, Daily    Continuous Glucose Sensor (FreeStyle Edyta 3 Plus Sensor) Use 1 sensor as " directed and change every 15 days.    Cream Base cream Hydrocortisone 2.5%-nitroglycerin 0.2% ointment: Apply a small amount to the appropriate area twice a day as needed    cyclobenzaprine (FLEXERIL) 10 mg, Oral, 2 Times Daily PRN    Hydrocortisone, Perianal, (ANUSOL-HC) 2.5 % rectal cream Rectal, 2 Times Daily    Jardiance 25 mg, Oral, Daily    metFORMIN (GLUCOPHAGE) 1,000 mg, Oral, 2 Times Daily With Meals    Mirabegron ER (MYRBETRIQ) 50 MG tablet sustained-release 24 hour 24 hr tablet Take 1 tablet by mouth Daily.    Multiple Vitamins-Minerals (MULTIVITAMIN ADULTS 50+ PO) 1 tablet, Daily    ondansetron (ZOFRAN) 4 mg, Oral, Every 8 Hours PRN    tadalafil (CIALIS) 10 mg, Oral, Daily    Triamcinolone Acetonide (Nasacort Allergy 24HR) 55 MCG/ACT nasal inhaler Administer 1-2 sprays into each nostril as directed by provider 2 (Two) Times a Day for 2 weeks, THEN only as needed thereafter      Return in about 6 months (around 12/17/2025) for Recheck with A1c, foot exam.    Electronically signed by: Jose Allen MD  06/17/2025

## 2025-06-17 NOTE — ASSESSMENT & PLAN NOTE
Diabetes is stable.   Continue current treatment regimen.  Diabetes will be reassessed in 6 months.    FreeStyle Edyta 3 CGM was downloaded today.  Data was reviewed from 6/2/25 to 6/15/25.  This showed good overnight glucose control.  Having some mild postprandial spikes.  Time in range was 85%.

## 2025-07-09 ENCOUNTER — SPECIALTY PHARMACY (OUTPATIENT)
Dept: ENDOCRINOLOGY | Facility: CLINIC | Age: 75
End: 2025-07-09
Payer: MEDICARE

## 2025-07-09 NOTE — PROGRESS NOTES
" Specialty Pharmacy Patient Management Program  Endocrinology Refill Outreach      Francisco \"Babak\" is a 74 y.o. male contacted today regarding refills of his medication(s).    Specialty medication(s) and dose(s) confirmed: Jardiance    Refill Questions      Flowsheet Row Most Recent Value   Changes to allergies? No   Changes to medications? No   New conditions or infections since last clinic visit No   Unplanned office visit, urgent care, ED, or hospital admission in the last 4 weeks  No   How does patient/caregiver feel medication is working? Very good   Financial problems or insurance changes  No   Since the previous refill, were any specialty medication doses or scheduled injections missed or delayed?  No   Does this patient require a clinical escalation to a pharmacist? No          Delivery Questions      Flowsheet Row Most Recent Value   Delivery method  at Pharmacy   Delivery address verified with patient/caregiver? Yes   Delivery address Other (Enter below)    Other address preferred [facility]   Number of medications in delivery 4   Medication(s) being filled and delivered Empagliflozin (JARDIANCE), Alfuzosin HCl (UROXATRAL), metFORMIN HCl (GLUCOPHAGE), Continuous Glucose Sensor (FreeStyle Edyta 3 Plus Sensor)   Copay verified? Yes   Copay amount $40.11   Copay form of payment Pay at pickup   Delivery Date Selection 07/10/25   Signature Required No            Follow-Up: 90 days; he will call for FreeStyle Edyta 3 Plus refill      Janki Villar, PharmD, LDE, CPT  Clinical Specialty Pharmacist, Endocrinology  7/9/2025  15:48 EDT         "

## 2025-07-11 RX ORDER — ATORVASTATIN CALCIUM 20 MG/1
20 TABLET, FILM COATED ORAL DAILY
Qty: 90 TABLET | Refills: 1 | Status: SHIPPED | OUTPATIENT
Start: 2025-07-11

## 2025-07-21 DIAGNOSIS — F41.1 ANXIETY STATE: ICD-10-CM

## 2025-07-22 RX ORDER — ALPRAZOLAM 0.5 MG
0.5 TABLET ORAL NIGHTLY PRN
Qty: 30 TABLET | Refills: 2 | Status: SHIPPED | OUTPATIENT
Start: 2025-07-22

## 2025-07-22 NOTE — TELEPHONE ENCOUNTER
Rx Refill Note  Requested Prescriptions     Pending Prescriptions Disp Refills    ALPRAZolam (XANAX) 0.5 MG tablet 30 tablet 2     Sig: Take 1 tablet by mouth At Night As Needed for Anxiety or Sleep.      Last office visit with prescribing clinician: 2/14/2025   Next office visit with prescribing clinician: 8/12/2025     LA: 04/01/24 #30 2R                        Would you like a call back once the refill request has been completed: [] Yes [] No    If the office needs to give you a call back, can they leave a voicemail: [] Yes [] No    Gia Winn LPN  07/22/25, 08:10 EDT

## 2025-07-23 ENCOUNTER — SPECIALTY PHARMACY (OUTPATIENT)
Dept: ENDOCRINOLOGY | Facility: CLINIC | Age: 75
End: 2025-07-23
Payer: MEDICARE

## 2025-07-23 NOTE — PROGRESS NOTES
" Specialty Pharmacy Patient Management Program  Endocrinology Refill Outreach      Francisco \"Babak\" is a 74 y.o. male contacted today regarding refills of his medication(s).    Specialty medication(s) and dose(s) confirmed: none    Refill Questions      Flowsheet Row Most Recent Value   Changes to allergies? No   Changes to medications? No   New conditions or infections since last clinic visit No   Unplanned office visit, urgent care, ED, or hospital admission in the last 4 weeks  No   How does patient/caregiver feel medication is working? Very good   Financial problems or insurance changes  No   Since the previous refill, were any specialty medication doses or scheduled injections missed or delayed?  No   Does this patient require a clinical escalation to a pharmacist? No          Delivery Questions      Flowsheet Row Most Recent Value   Delivery method  at Pharmacy   Delivery address verified with patient/caregiver? Yes   Delivery address Other (Enter below)   Other address preferred [facility]   Number of medications in delivery 1   Medication(s) being filled and delivered Mirabegron (MYRBETRIQ)   Doses left of specialty medications 2   Copay verified? Yes   Copay amount $0   Copay form of payment No copayment ($0)   Delivery Date Selection 07/23/25   Signature Required No            Janki Villar, PharmD, LDE, CPT  Clinical Specialty Pharmacist, Endocrinology  7/23/2025  12:10 EDT         "

## 2025-08-10 DIAGNOSIS — E78.2 MIXED HYPERLIPIDEMIA: Primary | Chronic | ICD-10-CM

## 2025-08-11 ENCOUNTER — LAB (OUTPATIENT)
Dept: LAB | Facility: HOSPITAL | Age: 75
End: 2025-08-11
Payer: MEDICARE

## 2025-08-11 DIAGNOSIS — E78.2 MIXED HYPERLIPIDEMIA: Chronic | ICD-10-CM

## 2025-08-11 LAB
ALBUMIN SERPL-MCNC: 3.9 G/DL (ref 3.5–5.2)
ALBUMIN/GLOB SERPL: 1.8 G/DL
ALP SERPL-CCNC: 67 U/L (ref 39–117)
ALT SERPL W P-5'-P-CCNC: 19 U/L (ref 1–41)
ANION GAP SERPL CALCULATED.3IONS-SCNC: 12.8 MMOL/L (ref 5–15)
AST SERPL-CCNC: 18 U/L (ref 1–40)
BILIRUB SERPL-MCNC: 0.4 MG/DL (ref 0–1.2)
BUN SERPL-MCNC: 24 MG/DL (ref 8–23)
BUN/CREAT SERPL: 21.1 (ref 7–25)
CALCIUM SPEC-SCNC: 9.2 MG/DL (ref 8.6–10.5)
CHLORIDE SERPL-SCNC: 106 MMOL/L (ref 98–107)
CHOLEST SERPL-MCNC: 181 MG/DL (ref 0–200)
CO2 SERPL-SCNC: 23.2 MMOL/L (ref 22–29)
CREAT SERPL-MCNC: 1.14 MG/DL (ref 0.76–1.27)
EGFRCR SERPLBLD CKD-EPI 2021: 68.3 ML/MIN/1.73
GLOBULIN UR ELPH-MCNC: 2.2 GM/DL
GLUCOSE SERPL-MCNC: 144 MG/DL (ref 65–99)
HDLC SERPL-MCNC: 70 MG/DL (ref 40–60)
LDLC SERPL CALC-MCNC: 96 MG/DL (ref 0–100)
LDLC/HDLC SERPL: 1.36 {RATIO}
POTASSIUM SERPL-SCNC: 4.8 MMOL/L (ref 3.5–5.2)
PROT SERPL-MCNC: 6.1 G/DL (ref 6–8.5)
SODIUM SERPL-SCNC: 142 MMOL/L (ref 136–145)
TRIGL SERPL-MCNC: 80 MG/DL (ref 0–150)
VLDLC SERPL-MCNC: 15 MG/DL (ref 5–40)

## 2025-08-11 PROCEDURE — 80061 LIPID PANEL: CPT

## 2025-08-11 PROCEDURE — 80053 COMPREHEN METABOLIC PANEL: CPT

## 2025-08-12 ENCOUNTER — OFFICE VISIT (OUTPATIENT)
Dept: INTERNAL MEDICINE | Facility: CLINIC | Age: 75
End: 2025-08-12
Payer: MEDICARE

## 2025-08-12 VITALS
TEMPERATURE: 96.8 F | DIASTOLIC BLOOD PRESSURE: 56 MMHG | WEIGHT: 172.8 LBS | OXYGEN SATURATION: 98 % | HEART RATE: 76 BPM | SYSTOLIC BLOOD PRESSURE: 106 MMHG | BODY MASS INDEX: 23.43 KG/M2

## 2025-08-12 DIAGNOSIS — E78.2 MIXED HYPERLIPIDEMIA: Chronic | ICD-10-CM

## 2025-08-12 DIAGNOSIS — F41.1 ANXIETY STATE: ICD-10-CM

## 2025-08-12 DIAGNOSIS — I10 ESSENTIAL HYPERTENSION: Primary | Chronic | ICD-10-CM
